# Patient Record
Sex: FEMALE | Race: BLACK OR AFRICAN AMERICAN | Employment: UNEMPLOYED | URBAN - METROPOLITAN AREA
[De-identification: names, ages, dates, MRNs, and addresses within clinical notes are randomized per-mention and may not be internally consistent; named-entity substitution may affect disease eponyms.]

---

## 2018-05-17 ENCOUNTER — HOSPITAL ENCOUNTER (INPATIENT)
Age: 73
LOS: 22 days | Discharge: HOME | DRG: 885 | End: 2018-06-08
Attending: PSYCHIATRY & NEUROLOGY | Admitting: PSYCHIATRY & NEUROLOGY
Payer: MEDICARE

## 2018-05-17 PROBLEM — F31.9 BIPOLAR DISORDER (HCC): Status: ACTIVE | Noted: 2018-05-17

## 2018-05-17 PROCEDURE — 65220000003 HC RM SEMIPRIVATE PSYCH

## 2018-05-17 PROCEDURE — 74011250637 HC RX REV CODE- 250/637: Performed by: NURSE PRACTITIONER

## 2018-05-17 RX ORDER — SIMVASTATIN 20 MG/1
20 TABLET, FILM COATED ORAL
COMMUNITY
End: 2018-06-08

## 2018-05-17 RX ORDER — AMOXICILLIN 250 MG
2 CAPSULE ORAL 2 TIMES DAILY
COMMUNITY
End: 2018-06-08

## 2018-05-17 RX ORDER — ACETAMINOPHEN 325 MG/1
650 TABLET ORAL
Status: DISCONTINUED | OUTPATIENT
Start: 2018-05-17 | End: 2018-06-08 | Stop reason: HOSPADM

## 2018-05-17 RX ORDER — LEVOTHYROXINE SODIUM 25 UG/1
25 TABLET ORAL
Status: DISCONTINUED | OUTPATIENT
Start: 2018-05-18 | End: 2018-05-18 | Stop reason: SDUPTHER

## 2018-05-17 RX ORDER — ZOLPIDEM TARTRATE 5 MG/1
5 TABLET ORAL
Status: DISCONTINUED | OUTPATIENT
Start: 2018-05-17 | End: 2018-06-08 | Stop reason: HOSPADM

## 2018-05-17 RX ORDER — IBUPROFEN 800 MG/1
800 TABLET ORAL
COMMUNITY

## 2018-05-17 RX ORDER — BENZTROPINE MESYLATE 1 MG/1
1 TABLET ORAL
Status: DISCONTINUED | OUTPATIENT
Start: 2018-05-17 | End: 2018-06-08 | Stop reason: HOSPADM

## 2018-05-17 RX ORDER — ALBUTEROL SULFATE 0.83 MG/ML
2.5 SOLUTION RESPIRATORY (INHALATION)
Status: DISCONTINUED | OUTPATIENT
Start: 2018-05-17 | End: 2018-05-18 | Stop reason: SDUPTHER

## 2018-05-17 RX ORDER — SIMVASTATIN 20 MG/1
20 TABLET, FILM COATED ORAL
Status: DISCONTINUED | OUTPATIENT
Start: 2018-05-17 | End: 2018-06-08 | Stop reason: HOSPADM

## 2018-05-17 RX ORDER — ALBUTEROL SULFATE 90 UG/1
1-2 AEROSOL, METERED RESPIRATORY (INHALATION)
Status: ON HOLD | COMMUNITY
End: 2018-06-08

## 2018-05-17 RX ORDER — CHOLECALCIFEROL (VITAMIN D3) 125 MCG
10 CAPSULE ORAL
COMMUNITY
End: 2018-06-08

## 2018-05-17 RX ORDER — LEVOTHYROXINE SODIUM 25 UG/1
25 TABLET ORAL
COMMUNITY
End: 2018-06-08

## 2018-05-17 RX ORDER — IBUPROFEN 200 MG
1 TABLET ORAL
Status: DISCONTINUED | OUTPATIENT
Start: 2018-05-17 | End: 2018-06-08 | Stop reason: HOSPADM

## 2018-05-17 RX ORDER — ADHESIVE BANDAGE
30 BANDAGE TOPICAL DAILY PRN
Status: DISCONTINUED | OUTPATIENT
Start: 2018-05-17 | End: 2018-06-08 | Stop reason: HOSPADM

## 2018-05-17 RX ORDER — CEPHALEXIN 500 MG/1
500 CAPSULE ORAL EVERY 12 HOURS
Status: COMPLETED | OUTPATIENT
Start: 2018-05-17 | End: 2018-05-21

## 2018-05-17 RX ORDER — OLANZAPINE 2.5 MG/1
2.5 TABLET ORAL
Status: DISCONTINUED | OUTPATIENT
Start: 2018-05-17 | End: 2018-05-28

## 2018-05-17 RX ORDER — DIVALPROEX SODIUM 250 MG/1
250 TABLET, DELAYED RELEASE ORAL
COMMUNITY
End: 2018-06-08

## 2018-05-17 RX ORDER — AMOXICILLIN 250 MG
1 CAPSULE ORAL
Status: DISCONTINUED | OUTPATIENT
Start: 2018-05-17 | End: 2018-06-08 | Stop reason: HOSPADM

## 2018-05-17 RX ORDER — IBUPROFEN 400 MG/1
400 TABLET ORAL
Status: DISCONTINUED | OUTPATIENT
Start: 2018-05-17 | End: 2018-05-18 | Stop reason: SDUPTHER

## 2018-05-17 RX ORDER — LOSARTAN POTASSIUM 50 MG/1
50 TABLET ORAL DAILY
COMMUNITY
End: 2018-06-08

## 2018-05-17 RX ORDER — BENZTROPINE MESYLATE 1 MG/ML
1 INJECTION INTRAMUSCULAR; INTRAVENOUS
Status: DISCONTINUED | OUTPATIENT
Start: 2018-05-17 | End: 2018-06-08 | Stop reason: HOSPADM

## 2018-05-17 RX ADMIN — STANDARDIZED SENNA CONCENTRATE AND DOCUSATE SODIUM 1 TABLET: 8.6; 5 TABLET, FILM COATED ORAL at 20:37

## 2018-05-17 RX ADMIN — SIMVASTATIN 20 MG: 20 TABLET, FILM COATED ORAL at 20:38

## 2018-05-17 RX ADMIN — CEPHALEXIN 500 MG: 500 CAPSULE ORAL at 20:37

## 2018-05-17 NOTE — CONSULTS
.        Hospitalist consult for H&P Note          Tam Lepe NP  Phone [phone]  Call physician on-call through the  7pm-7am    Primary Care Provider: Jimenez Barrett MD  Source of Information: Patient/Chart    History of Presenting Illness:   Pt admitted under TDO to [facility] psychiatry due to manic and delusional behavior, no sleep x 5 days. Her hygiene was poor, she had not been eating or drinking and had developed aggressive behaviors towards family. She is normally followed at home by a PACT program who gives her medications. She denies any recent medical problems. See ROS as below. She is aware she is in [place], could not recall name of hospital (thought is was [facility]) and knows what year/month this is. Says she is here because her family was trying to hurt her and then they put her here. Pt is cooperative and conversant. Review of Systems:  General: negative for fever, chills, sweats, weakness, wt loss  Eyes: negative for changes or loss in vision  Ear Nose and Throat: negative for rspeech or swallowing difficulties  Respiratory:  negative for cough, sputum production, SOB, wheezing, MUNOZ  Cardiology:  negative for chest pain, palpitations, edema   Gastrointestinal: negative for abdominal pain, N/V, change in bowel habits, bleeding  Genitourinary: negative for frequency, urgency, dysuria  Musculoskeletal : negative for arthralgia, myalgia  Skin: negative for rash, ulceration, new lesion  Neurological: negative for headache, dizziness, focal weakness, paresthesia, gait disturbance  Psychological: negative for anxiety, depression, agitation    Past Medical History:   Diagnosis Date    Asthma     Bipolar 1 disorder ([facility] [address]       No past surgical history on file. Prior to Admission medications    Medication Sig Start Date End Date Taking? Authorizing Provider   chlorproMAZINE (THORAZINE) 200 mg tablet Take 200 mg by mouth three (3) times daily.     hCriss White MD haloperidol decanoate (HALDOL DECANOATE) 100 mg/mL injection 100 mg by IntraMUSCular route every twenty-eight (28) days. Chriss White MD   amLODIPine (NORVASC) 10 mg tablet Take 10 mg by mouth daily. Chriss White MD   furosemide (LASIX) 20 mg tablet daily 12/27/13   Lesli Link MD     No Known Allergies   No family history on file. SOCIAL HISTORY:  Patient resides:  Independently    Assisted Living    SNF    With family care xx      Smoking history:   None xx   Former    Chronic      Alcohol history:   None xx   Social    Chronic      Ambulates:   Independently    w/cane    w/walker xx   w/wc      CODE STATUS:  DNR    Full xx   Other      Objective:   Physical Exam:   Visit Vitals    /78    Pulse 82    Temp 98 °F (36.7 °C)    Resp 16    SpO2 98%           General:  Alert, cooperative, no distress, appears stated age. HEENT:  Normocephalic, atraumatic. Conjunctivae/corneas clear. PERRL, EOMs intact. Mucosa nl. Edentulous. Lungs:   Clear to auscultation bilaterally. No Wheezing/Rhonchi/Rales. No SOB, no accessory muscle use and on RA    Heart:  Regular rate and rhythm, no murmur    Abdomen:   Soft, non-tender. Bowel sounds nl. Extremities: Extremities nl, atraumatic, no clubbing, cyanosis or edema. Pulses 2+ and symmetric. Good ROM. + crepitus in knees. Skin: Skin color, texture, turgor nl. No rashes or lesions. Psych: Cooperative, not anxious or agitated. A/O x 2-3.     Neurologic: No focal neurological deficits, moving all extremities, speech clear    Data Review:   Reviewed labs from outside hospital (in bedside chart)    Imaging: Reviewed CT results from outside hospital    Assessment & Plan     Altered Mental Status with Manic and Aggressive Behaviors:  - TDO  - hx of bipolar disorder and anxiety  - CT scan of head from outside facility: No acute abnormality  - pt oriented x 2-3 during assessment but still appears to have some delusions that her family is trying to hurt her    Possible UTI  - UA with nitrites, leuks and + bacteria from outside hospital  - pt started on keflex and appears as though she received two doses. Will continue this x 4 more days (to complete course of 5 days)    Hyponatremia:  - labs from outside hospital: Na 131  - received saline in outside hospital  - encourage po intakes  - follow labs in am    Hx Hypertension:  - med reconcillation reviewed, pt on losartan and norvasc at home  - BP normotensive at present, will hold on meds for right now, monitor    Hx Hypothyroidism:   - on 25 mcg synthroid at home - restart  - TSH ordered for am    Hx Asthma: resume albuterol prn    Hx Hyperlipidemia: resume simvastatin    Hx Osteoarthritis: supportive care and ibuprofen q 8 hours prn    Dispo: as per primary team  Will follow up in am - review vitals signs and labs. Adjust medications as needed.      Signed By: Meghan Majano NP     May 17, 2018

## 2018-05-17 NOTE — BH NOTES
Behavioral Health Admission Note:    Patient admitted under Dr Yaneth Randall for Bipolar    Admission status:  TDO    Chief complaint: Manic, Delusional    PTA MED VERIFICATION     PATIENT'S PHARMACY IS - [facility] PACT program  THE PHARMACY WAS - medication list provided  THEREFORE PTA MEDS WERE VERIFIED AND RECORDED ON PTA MED LIST    Primary Nurse Priyank Almanzar RN and Chapito Villanueva RN  performed a dual skin assessment on this patient           Jewelry on patient: none  Patient behavior: Pleasant  Safety: Q 15 min check, fall precaustions      Pressure Ulcer Documentation  (COMPLETE ONE LABEL PER PRESSURE ULCER)  For further information, please review corresponding Wound Care flowsheet. Vijaya Saravia has:     No pressure ulcer noted and pressure ulcer prevention initiated.

## 2018-05-17 NOTE — IP AVS SNAPSHOT
2700 [facility] [address] [place] [address] 
[phone] Patient: Sherif Rdz MRN: [id number] UZY:2/45/0252 A check hannah indicates which time of day the medication should be taken. My Medications START taking these medications Instructions Each Dose to Equal  
 Morning Noon Evening Bedtime  
 furosemide 20 mg tablet Commonly known as:  LASIX Your last dose was: Your next dose is: Take 1 Tab by mouth daily. Indications: Edema 20 mg  
    
   
   
   
  
 haloperidol 2 mg/mL oral concentrate Commonly known as:  HALDOL Your last dose was: Your next dose is: Take 2 mL by mouth three (3) times daily. Indications: Psychotic Disorder 4 mg  
    
   
   
   
  
 haloperidol decanoate 100 mg/mL injection Commonly known as:  HALDOL DECANOATE Start taking on:  7/2/2018 Replaces:  haloperidol decanoate 50 mg/mL injection Your last dose was: Your next dose is: 0.75 mL by IntraMUSCular route every twenty-eight (28) days. DUE June 22nd  Indications: schizoaffective disorder 75 mg  
    
   
   
   
  
 lisinopril 10 mg tablet Commonly known as:  Tildon Yoko Start taking on:  6/9/2018 Your last dose was: Your next dose is: Take 1 Tab by mouth daily. Indications: hypertension 10 mg  
    
   
   
   
  
 valproic acid (as sodium salt) 250 mg/5 mL (5 mL) Soln oral solution Commonly known as:  Dima Romero Your last dose was: Your next dose is: Take 10 mL by mouth two (2) times a day. Indications: Bipolar Disorder 500 mg CHANGE how you take these medications Instructions Each Dose to Equal  
 Morning Noon Evening Bedtime  
 senna-docusate 8.6-50 mg per tablet Commonly known as:  Sergei Frausto What changed:  when to take this Your last dose was: Your next dose is: Take 2 Tabs by mouth daily. Indications: constipation 2 Tab CONTINUE taking these medications Instructions Each Dose to Equal  
 Morning Noon Evening Bedtime  
 albuterol 90 mcg/actuation inhaler Commonly known as:  PROVENTIL HFA, VENTOLIN HFA, PROAIR HFA Your last dose was: Your next dose is: Take 1-2 Puffs by inhalation every four (4) hours as needed for Wheezing or Shortness of Breath. Indications: Acute Asthma Attack 1-2 Puff  
    
   
   
   
  
 ibuprofen 800 mg tablet Commonly known as:  MOTRIN Your last dose was: Your next dose is: Take 800 mg by mouth every eight (8) hours as needed for Pain. 800 mg  
    
   
   
   
  
 levothyroxine 25 mcg tablet Commonly known as:  SYNTHROID Start taking on:  6/9/2018 Your last dose was: Your next dose is: Take 1 Tab by mouth Daily (before breakfast). Indications: hypothyroidism 25 mcg  
    
   
   
   
  
 simvastatin 20 mg tablet Commonly known as:  ZOCOR Your last dose was: Your next dose is: Take 1 Tab by mouth nightly. Indications: hyperlipidemia 20 mg  
    
   
   
   
  
  
STOP taking these medications   
 amLODIPine 10 mg tablet Commonly known as:  NORVASC  
   
  
 chlorproMAZINE 200 mg tablet Commonly known as:  THORAZINE  
   
  
 divalproex  mg tablet Commonly known as:  DEPAKOTE  
   
  
 haloperidol decanoate 50 mg/mL injection Commonly known as:  HALDOL DECANOATE Replaced by:  haloperidol decanoate 100 mg/mL injection  
   
  
 losartan 50 mg tablet Commonly known as:  COZAAR  
   
  
 melatonin Tab tablet Where to Get Your Medications Information on where to get these meds will be given to you by the nurse or doctor. ! Ask your nurse or doctor about these medications  
  albuterol 90 mcg/actuation inhaler furosemide 20 mg tablet  
 haloperidol 2 mg/mL oral concentrate  
 haloperidol decanoate 100 mg/mL injection  
 levothyroxine 25 mcg tablet  
 lisinopril 10 mg tablet  
 senna-docusate 8.6-50 mg per tablet  
 simvastatin 20 mg tablet  
 valproic acid (as sodium salt) 250 mg/5 mL (5 mL) Soln oral solution

## 2018-05-17 NOTE — IP AVS SNAPSHOT
2700 [facility] [address] [place] [address] 
[phone] Patient: Kiran Robison MRN: [id number] DRR:9/48/4383 About your hospitalization You were admitted on:  May 17, 2018 You last received care in the:  [address] [place] [address] You were discharged on:  June 8, 2018 Why you were hospitalized Your primary diagnosis was:  Bipolar Disorder (Hcc) Follow-up Information Follow up With Details Comments Contact Info Jatinder Alcantar MD Schedule an appointment as soon as possible for a visit  [address] [place] [phone] 
[phone] [address] On 6/9/2018 Patient's PACT team will come see Patient in the home. [address] [place] [address]) [phone] Discharge Orders None A check hannah indicates which time of day the medication should be taken. My Medications START taking these medications Instructions Each Dose to Equal  
 Morning Noon Evening Bedtime  
 furosemide 20 mg tablet Commonly known as:  LASIX Your last dose was: Your next dose is: Take 1 Tab by mouth daily. Indications: Edema 20 mg  
    
   
   
   
  
 haloperidol 2 mg/mL oral concentrate Commonly known as:  HALDOL Your last dose was: Your next dose is: Take 2 mL by mouth three (3) times daily. Indications: Psychotic Disorder 4 mg  
    
   
   
   
  
 haloperidol decanoate 100 mg/mL injection Commonly known as:  HALDOL DECANOATE Start taking on:  7/2/2018 Replaces:  haloperidol decanoate 50 mg/mL injection Your last dose was: Your next dose is: 0.75 mL by IntraMUSCular route every twenty-eight (28) days. DUE June 22nd  Indications: schizoaffective disorder 75 mg  
    
   
   
   
  
 lisinopril 10 mg tablet Commonly known as:  Therisa Semen  
 Start taking on:  6/9/2018 Your last dose was: Your next dose is: Take 1 Tab by mouth daily. Indications: hypertension 10 mg  
    
   
   
   
  
 valproic acid (as sodium salt) 250 mg/5 mL (5 mL) Soln oral solution Commonly known as:  Kenyetta Zamora Your last dose was: Your next dose is: Take 10 mL by mouth two (2) times a day. Indications: Bipolar Disorder 500 mg CHANGE how you take these medications Instructions Each Dose to Equal  
 Morning Noon Evening Bedtime  
 senna-docusate 8.6-50 mg per tablet Commonly known as:  Paxton Parsons What changed:  when to take this Your last dose was: Your next dose is: Take 2 Tabs by mouth daily. Indications: constipation 2 Tab CONTINUE taking these medications Instructions Each Dose to Equal  
 Morning Noon Evening Bedtime  
 albuterol 90 mcg/actuation inhaler Commonly known as:  PROVENTIL HFA, VENTOLIN HFA, PROAIR HFA Your last dose was: Your next dose is: Take 1-2 Puffs by inhalation every four (4) hours as needed for Wheezing or Shortness of Breath. Indications: Acute Asthma Attack 1-2 Puff  
    
   
   
   
  
 ibuprofen 800 mg tablet Commonly known as:  MOTRIN Your last dose was: Your next dose is: Take 800 mg by mouth every eight (8) hours as needed for Pain. 800 mg  
    
   
   
   
  
 levothyroxine 25 mcg tablet Commonly known as:  SYNTHROID Start taking on:  6/9/2018 Your last dose was: Your next dose is: Take 1 Tab by mouth Daily (before breakfast). Indications: hypothyroidism 25 mcg  
    
   
   
   
  
 simvastatin 20 mg tablet Commonly known as:  ZOCOR Your last dose was: Your next dose is: Take 1 Tab by mouth nightly. Indications: hyperlipidemia  20 mg  
    
   
   
   
  
  
 STOP taking these medications   
 amLODIPine 10 mg tablet Commonly known as:  NORVASC  
   
  
 chlorproMAZINE 200 mg tablet Commonly known as:  THORAZINE  
   
  
 divalproex  mg tablet Commonly known as:  DEPAKOTE  
   
  
 haloperidol decanoate 50 mg/mL injection Commonly known as:  HALDOL DECANOATE Replaced by:  haloperidol decanoate 100 mg/mL injection  
   
  
 losartan 50 mg tablet Commonly known as:  COZAAR  
   
  
 melatonin Tab tablet Where to Get Your Medications Information on where to get these meds will be given to you by the nurse or doctor. ! Ask your nurse or doctor about these medications  
  albuterol 90 mcg/actuation inhaler  
 furosemide 20 mg tablet  
 haloperidol 2 mg/mL oral concentrate  
 haloperidol decanoate 100 mg/mL injection  
 levothyroxine 25 mcg tablet  
 lisinopril 10 mg tablet  
 senna-docusate 8.6-50 mg per tablet  
 simvastatin 20 mg tablet  
 valproic acid (as sodium salt) 250 mg/5 mL (5 mL) Soln oral solution Discharge Instructions DISCHARGE SUMMARY 
 
NAME:Maryann Gonzales : 1945 MRN: [id number] The patient Jazmin Rockwell exhibits the ability to control behavior in a less restrictive environment. Patient's level of functioning is improving. No assaultive/destructive behavior has been observed for the past 24 hours. No suicidal/homicidal threat or behavior has been observed for the past 24 hours. There is no evidence of serious medication side effects. Patient has not been in physical or protective restraints for at least the past 24 hours. If weapons involved, how are they secured? No weapons involved. Is patient aware of and in agreement with discharge plan? Yes Arrangements for medication:  Prescriptions given to patient. Referral for substance abuse treatment? Patient is not using substances/Not applicable. Referral for smoking cessation needed?   Patient is not a smoker/Not applicable. Copy of discharge instructions to provider?:  Marina Mcdaniel Arrangements for transportation home:  Medicaid taxi. Keep all follow up appointments as scheduled, continue to take prescribed medications per physician instructions. Mental health crisis number:  278 or your local mental health crisis line number at [phone]. DISCHARGE SUMMARY from Nurse PATIENT INSTRUCTIONS: 
 
What to do at Home: 
Recommended activity: Activity as tolerated, If you experience any of the following symptoms Anxiety, depression, thoughts about hurting yourself or others, please follow up with 911 or your local mental health crisis line number at [phone]. . 
 
*  Please give a list of your current medications to your Primary Care Provider. *  Please update this list whenever your medications are discontinued, doses are 
    changed, or new medications (including over-the-counter products) are added. *  Please carry medication information at all times in case of emergency situations. These are general instructions for a healthy lifestyle: No smoking/ No tobacco products/ Avoid exposure to second hand smoke Surgeon General's Warning:  Quitting smoking now greatly reduces serious risk to your health. Obesity, smoking, and sedentary lifestyle greatly increases your risk for illness A healthy diet, regular physical exercise & weight monitoring are important for maintaining a healthy lifestyle You may be retaining fluid if you have a history of heart failure or if you experience any of the following symptoms:  Weight gain of 3 pounds or more overnight or 5 pounds in a week, increased swelling in our hands or feet or shortness of breath while lying flat in bed. Please call your doctor as soon as you notice any of these symptoms; do not wait until your next office visit. Recognize signs and symptoms of STROKE: 
 
F-face looks uneven A-arms unable to move or move unevenly S-speech slurred or non-existent T-time-call 911 as soon as signs and symptoms begin-DO NOT go Back to bed or wait to see if you get better-TIME IS BRAIN. Warning Signs of HEART ATTACK Call 911 if you have these symptoms: 
? Chest discomfort. Most heart attacks involve discomfort in the center of the chest that lasts more than a few minutes, or that goes away and comes back. It can feel like uncomfortable pressure, squeezing, fullness, or pain. ? Discomfort in other areas of the upper body. Symptoms can include pain or discomfort in one or both arms, the back, neck, jaw, or stomach. ? Shortness of breath with or without chest discomfort. ? Other signs may include breaking out in a cold sweat, nausea, or lightheadedness. Don't wait more than five minutes to call [address]! Fast action can save your life. Calling 911 is almost always the fastest way to get lifesaving treatment. Emergency Medical Services staff can begin treatment when they arrive  up to an hour sooner than if someone gets to the hospital by car. The discharge information has been reviewed with the patient. The patient verbalized understanding. Discharge medications reviewed with the patient and appropriate educational materials and side effects teaching were provided. ___________________________________________________________________________________________________________________________________ Introducing [facility]! Jalen Ballesteros introduces [employer] patient portal. Now you can access parts of your medical record, email your doctor's office, and request medication refills online. 1. In your internet browser, go to [email or web address] 2. Click on the First Time User? Click Here link in the Sign In box. You will see the New Member Sign Up page. 3. Enter your MyChart Access Code exactly as it appears below.  You will not need to use this code after youve completed the sign-up process. If you do not sign up before the expiration date, you must request a new code. · [employer] Access Code: [email or web address] Expires: 8/17/2018  8:12 PM 
 
4. Enter the last four digits of your Social Security Number (xxxx) and Date of Birth (mm/dd/yyyy) as indicated and click Submit. You will be taken to the next sign-up page. 5. Create a [employer] ID. This will be your [employer] login ID and cannot be changed, so think of one that is secure and easy to remember. 6. Create a [employer] password. You can change your password at any time. 7. Enter your Password Reset Question and Answer. This can be used at a later time if you forget your password. 8. Enter your e-mail address. You will receive e-mail notification when new information is available in [address] 9. Click Sign Up. You can now view and download portions of your medical record. 10. Click the Download Summary menu link to download a portable copy of your medical information. If you have questions, please visit the Frequently Asked Questions section of the [employer] website. Remember, [employer] is NOT to be used for urgent needs. For medical emergencies, dial 911. Now available from your iPhone and Android! Introducing [employer] As a [employer] patient, I wanted to make you aware of our electronic visit tool called [employer]. [employer] 24/7 allows you to connect within minutes with a medical provider 24 hours a day, seven days a week via a mobile device or tablet or logging into a secure website from your computer. You can access Gray Flower from anywhere in the [place].  
 
A virtual visit might be right for you when you have a simple condition and feel like you just dont want to get out of bed, or cant get away from work for an appointment, when your regular [employer] provider is not available (evenings, weekends or holidays), or when youre out of town and need minor care. Electronic visits cost only $49 and if the [facility] provider determines a prescription is needed to treat your condition, one can be electronically transmitted to a nearby pharmacy*. Please take a moment to enroll today if you have not already done so. The enrollment process is free and takes just a few minutes. To enroll, please download the WAKU WAKU ???? dayton to your tablet or phone, or visit [email or web address] to enroll on your computer. And, as an [address] [place] [address] patient with a [employer] account, the results of your visits will be scanned into your electronic medical record and your primary care provider will be able to view the scanned results. We urge you to continue to see your regular [facility] provider for your ongoing medical care. And while your primary care provider may not be the one available when you seek a [employer] virtual visit, the peace of mind you get from getting a real diagnosis real time can be priceless. For more information on [employer], view our Frequently Asked Questions (FAQs) at [email or web address]. Sincerely, 
 
Lynda Ochoa MD 
Chief Medical Officer [facility] Jayna Wing *:  certain medications cannot be prescribed via [employer] Providers Seen During Your Hospitalization Provider Specialty Primary office phone Nimco Machado MD Psychiatry [phone] Flory Nobles MD Psychiatry [phone] Geovany Lara MD Psychiatry [phone] Your Primary Care Physician (PCP) Primary Care Physician Office Phone Office Fax Danae Chan [phone] You are allergic to the following No active allergies Recent Documentation Height Weight BMI Smoking Status 1.549 m 74.1 kg 30.87 kg/m2 Never Smoker Emergency Contacts Name Discharge Info Relation Home Work Mobile Nando Fritz  Child [2] [phone] Patient Belongings The following personal items are in your possession at time of discharge: 
     Visual Aid: Glasses      Home Medications: Locked (aspirin bottle and inhaler)      Clothing: Footwear (locked in belongings closet)    Other Valuables: Peola Plana, Wallet (locked in belonging closet)  Personal Items Sent to Safe: debit card, DL, 21.00 cash Please provide this summary of care documentation to your next provider. Signatures-by signing, you are acknowledging that this After Visit Summary has been reviewed with you and you have received a copy. Patient Signature:  ____________________________________________________________ Date:  ____________________________________________________________  
  
Nery Cristobal Provider Signature:  ____________________________________________________________ Date:  ____________________________________________________________

## 2018-05-18 ENCOUNTER — APPOINTMENT (OUTPATIENT)
Dept: GENERAL RADIOLOGY | Age: 73
DRG: 885 | End: 2018-05-18
Attending: FAMILY MEDICINE
Payer: MEDICARE

## 2018-05-18 LAB
ALBUMIN SERPL-MCNC: 3 G/DL (ref 3.5–5)
ALBUMIN/GLOB SERPL: 1 {RATIO} (ref 1.1–2.2)
ALP SERPL-CCNC: 95 U/L (ref 45–117)
ALT SERPL-CCNC: 20 U/L (ref 12–78)
ANION GAP SERPL CALC-SCNC: 7 MMOL/L (ref 5–15)
AST SERPL-CCNC: 17 U/L (ref 15–37)
BILIRUB SERPL-MCNC: 0.2 MG/DL (ref 0.2–1)
BUN SERPL-MCNC: 12 MG/DL (ref 6–20)
BUN/CREAT SERPL: 21 (ref 12–20)
CALCIUM SERPL-MCNC: 8.6 MG/DL (ref 8.5–10.1)
CHLORIDE SERPL-SCNC: 102 MMOL/L (ref 97–108)
CHOLEST SERPL-MCNC: 132 MG/DL
CO2 SERPL-SCNC: 28 MMOL/L (ref 21–32)
CREAT SERPL-MCNC: 0.56 MG/DL (ref 0.55–1.02)
ERYTHROCYTE [DISTWIDTH] IN BLOOD BY AUTOMATED COUNT: 13.2 % (ref 11.5–14.5)
GLOBULIN SER CALC-MCNC: 3 G/DL (ref 2–4)
GLUCOSE P FAST SERPL-MCNC: 81 MG/DL (ref 65–100)
GLUCOSE SERPL-MCNC: 81 MG/DL (ref 65–100)
HCT VFR BLD AUTO: 36.3 % (ref 35–47)
HDLC SERPL-MCNC: 73 MG/DL
HDLC SERPL: 1.8 {RATIO} (ref 0–5)
HGB BLD-MCNC: 12.1 G/DL (ref 11.5–16)
LDLC SERPL CALC-MCNC: 51 MG/DL (ref 0–100)
LIPID PROFILE,FLP: NORMAL
MCH RBC QN AUTO: 29.5 PG (ref 26–34)
MCHC RBC AUTO-ENTMCNC: 33.3 G/DL (ref 30–36.5)
MCV RBC AUTO: 88.5 FL (ref 80–99)
NRBC # BLD: 0 K/UL (ref 0–0.01)
NRBC BLD-RTO: 0 PER 100 WBC
PLATELET # BLD AUTO: 223 K/UL (ref 150–400)
PMV BLD AUTO: 9 FL (ref 8.9–12.9)
POTASSIUM SERPL-SCNC: 3.8 MMOL/L (ref 3.5–5.1)
PROT SERPL-MCNC: 6 G/DL (ref 6.4–8.2)
RBC # BLD AUTO: 4.1 M/UL (ref 3.8–5.2)
SODIUM SERPL-SCNC: 137 MMOL/L (ref 136–145)
TRIGL SERPL-MCNC: 40 MG/DL (ref ?–150)
TSH SERPL DL<=0.05 MIU/L-ACNC: 1.11 UIU/ML (ref 0.36–3.74)
VLDLC SERPL CALC-MCNC: 8 MG/DL
WBC # BLD AUTO: 3.2 K/UL (ref 3.6–11)

## 2018-05-18 PROCEDURE — 94640 AIRWAY INHALATION TREATMENT: CPT

## 2018-05-18 PROCEDURE — 84443 ASSAY THYROID STIM HORMONE: CPT | Performed by: PSYCHIATRY & NEUROLOGY

## 2018-05-18 PROCEDURE — 82947 ASSAY GLUCOSE BLOOD QUANT: CPT | Performed by: PSYCHIATRY & NEUROLOGY

## 2018-05-18 PROCEDURE — 65220000003 HC RM SEMIPRIVATE PSYCH

## 2018-05-18 PROCEDURE — 85027 COMPLETE CBC AUTOMATED: CPT | Performed by: PSYCHIATRY & NEUROLOGY

## 2018-05-18 PROCEDURE — 80053 COMPREHEN METABOLIC PANEL: CPT | Performed by: PSYCHIATRY & NEUROLOGY

## 2018-05-18 PROCEDURE — 74011250637 HC RX REV CODE- 250/637: Performed by: PSYCHIATRY & NEUROLOGY

## 2018-05-18 PROCEDURE — 36415 COLL VENOUS BLD VENIPUNCTURE: CPT | Performed by: PSYCHIATRY & NEUROLOGY

## 2018-05-18 PROCEDURE — 74011250637 HC RX REV CODE- 250/637: Performed by: NURSE PRACTITIONER

## 2018-05-18 PROCEDURE — 71046 X-RAY EXAM CHEST 2 VIEWS: CPT

## 2018-05-18 PROCEDURE — 74011000250 HC RX REV CODE- 250: Performed by: NURSE PRACTITIONER

## 2018-05-18 PROCEDURE — 80061 LIPID PANEL: CPT | Performed by: PSYCHIATRY & NEUROLOGY

## 2018-05-18 RX ORDER — IBUPROFEN 400 MG/1
800 TABLET ORAL
Status: DISCONTINUED | OUTPATIENT
Start: 2018-05-18 | End: 2018-06-08 | Stop reason: HOSPADM

## 2018-05-18 RX ORDER — CHLORPROMAZINE HYDROCHLORIDE 200 MG/1
400 TABLET, FILM COATED ORAL EVERY EVENING
COMMUNITY
End: 2018-06-08

## 2018-05-18 RX ORDER — LEVOTHYROXINE SODIUM 25 UG/1
25 TABLET ORAL
Status: DISCONTINUED | OUTPATIENT
Start: 2018-05-19 | End: 2018-06-08 | Stop reason: HOSPADM

## 2018-05-18 RX ORDER — SENNOSIDES 8.6 MG/1
1 TABLET ORAL DAILY
Status: DISCONTINUED | OUTPATIENT
Start: 2018-05-19 | End: 2018-05-18

## 2018-05-18 RX ORDER — CHLORPROMAZINE HYDROCHLORIDE 200 MG/1
200 TABLET, FILM COATED ORAL DAILY
COMMUNITY
End: 2018-06-08

## 2018-05-18 RX ORDER — ALBUTEROL SULFATE 0.83 MG/ML
2.5 SOLUTION RESPIRATORY (INHALATION)
Status: DISCONTINUED | OUTPATIENT
Start: 2018-05-18 | End: 2018-06-08 | Stop reason: HOSPADM

## 2018-05-18 RX ORDER — HALOPERIDOL DECANOATE 100 MG/ML
100 INJECTION INTRAMUSCULAR
Status: DISCONTINUED | OUTPATIENT
Start: 2018-05-18 | End: 2018-05-18

## 2018-05-18 RX ORDER — ALBUTEROL SULFATE 90 UG/1
1-2 AEROSOL, METERED RESPIRATORY (INHALATION)
Status: DISCONTINUED | OUTPATIENT
Start: 2018-05-18 | End: 2018-05-18 | Stop reason: ALTCHOICE

## 2018-05-18 RX ORDER — CHLORPROMAZINE HYDROCHLORIDE 50 MG/1
400 TABLET, FILM COATED ORAL EVERY EVENING
Status: DISCONTINUED | OUTPATIENT
Start: 2018-05-18 | End: 2018-05-28

## 2018-05-18 RX ORDER — CHLORPROMAZINE HYDROCHLORIDE 50 MG/1
200 TABLET, FILM COATED ORAL
Status: DISCONTINUED | OUTPATIENT
Start: 2018-05-19 | End: 2018-05-28

## 2018-05-18 RX ORDER — HALOPERIDOL DECANOATE 50 MG/ML
37.5 INJECTION INTRAMUSCULAR
COMMUNITY
End: 2018-06-08

## 2018-05-18 RX ORDER — DIVALPROEX SODIUM 125 MG/1
250 TABLET, DELAYED RELEASE ORAL
Status: DISCONTINUED | OUTPATIENT
Start: 2018-05-18 | End: 2018-05-25

## 2018-05-18 RX ADMIN — LEVOTHYROXINE SODIUM 25 MCG: 25 TABLET ORAL at 06:55

## 2018-05-18 RX ADMIN — DIVALPROEX SODIUM 250 MG: 125 TABLET, DELAYED RELEASE ORAL at 21:02

## 2018-05-18 RX ADMIN — STANDARDIZED SENNA CONCENTRATE AND DOCUSATE SODIUM 1 TABLET: 8.6; 5 TABLET, FILM COATED ORAL at 21:02

## 2018-05-18 RX ADMIN — SIMVASTATIN 20 MG: 20 TABLET, FILM COATED ORAL at 21:02

## 2018-05-18 RX ADMIN — CEPHALEXIN 500 MG: 500 CAPSULE ORAL at 21:02

## 2018-05-18 RX ADMIN — CEPHALEXIN 500 MG: 500 CAPSULE ORAL at 08:30

## 2018-05-18 RX ADMIN — ALBUTEROL SULFATE 2.5 MG: 2.5 SOLUTION RESPIRATORY (INHALATION) at 21:57

## 2018-05-18 RX ADMIN — CHLORPROMAZINE HYDROCHLORIDE 400 MG: 50 TABLET, SUGAR COATED ORAL at 17:13

## 2018-05-18 NOTE — PROGRESS NOTES
Problem: Altered Thought Process (Adult/Pediatric)  Goal: *STG: Participates in treatment plan  Outcome: Progressing Towards Goal  Pt arrived on the unit and was pleasant and cooperative. Pt was patient when she needed to wait for her dinner. Pt compliant with meal and meds. Pt went to bed shortly after dinner.

## 2018-05-18 NOTE — PROGRESS NOTES
Problem: Altered Thought Process (Adult/Pediatric)  Goal: *STG: Attends activities and groups  Outcome: Progressing Towards Goal  1515 Received patient resting in bed with eyes open. Greeted staff with a smile. NAD. On q 15 min. Safety checks. Voiced no complaints. VS taken and WNL. Patient ate 100% dinner & fluids in DR with peers. She was observed coughing. Offered water. She said \"don't need water, I need my inhaler\". No inhaler was ordered. Checked PTA meds and inhaler was listed. Called Hospitalist and he wrote order for Chest X-Ray and also for Jet Nebs Treatment. Chest X-Ray was completed. See results. Resp. Tx called and patient received treatment. Patient was visible in DR all shift watching TV, eating dinner and talking. Remains hyper verbal, loud with pressured speech. She was med compliant. Focused on calling her son and how she would get home. Son was called several times and no message left due to mail box was full. Limit setting and redirections given. Cooperative with staff.

## 2018-05-18 NOTE — BH NOTES
PSYCHOSOCIAL ASSESSMENT  :Patient identifying info: Sherif Rdz is a 75 y.o., female admitted 2018  4:03 PM     Presenting problem and precipitating factors: Patient was transferred for the [facility] on a TDO due to psychotic behavior. She is followed by the [address] [place] [address] PACT team and lives with her sisters. She has not been sleeping , not caring for her ADL' s and has been threatening to her sisters . She was committed to [facility] today at her hearing. Mental status assessment:She is alert oriented x's 3 - she is delusional and guarded. Insight and judgement are impaired. Current psychiatric providers and contact info: [facility] team - [phone]    Previous psychiatric services/providers and response to treatment: she has a long history of psych treatment     Family history of mental illness :unknown      Substance abuse history:    Social History   Substance Use Topics    Smoking status: Never Smoker    Smokeless tobacco: Not on file    Alcohol use No       Family constellation:     Is significant other involved?        Describe support system: sisters     Describe living arrangements and home environment:lives at home with her sisters   Health issues:   Hospital Problems  Date Reviewed: 2013          Codes Class Noted POA    Bipolar disorder [facility]) ICD-10-CM: F31.9  ICD-9-CM: 296.80  2018 Unknown              Trauma history: none noted     Legal issues: no    History of  service: no    Financial status: disability     Amish/cultural factors: none noted     Education/work history: unknown    Have you been licensed as a alan care professional (current or ): no  Leisure and recreation preferences: no  Describe coping skills:ineffectual     Yvonne Hernandez  2018

## 2018-05-18 NOTE — PROGRESS NOTES
Laboratory Monitoring for Antipsychotics: This patient is currently prescribed the following medication(s):   Current Facility-Administered Medications   Medication Dose Route Frequency    divalproex DR (DEPAKOTE) tablet 250 mg  250 mg Oral QHS    [START ON 5/19/2018] levothyroxine (SYNTHROID) tablet 25 mcg  25 mcg Oral ACB    [START ON 5/19/2018] chlorproMAZINE (THORAZINE) tablet 200 mg  200 mg Oral 7am    chlorproMAZINE (THORAZINE) tablet 400 mg  400 mg Oral QPM    cephALEXin (KEFLEX) capsule 500 mg  500 mg Oral Q12H    senna-docusate (PERICOLACE) 8.6-50 mg per tablet 1 Tab  1 Tab Oral QHS    simvastatin (ZOCOR) tablet 20 mg  20 mg Oral QHS     The following labs have been completed for monitoring of antipsychotics and/or mood stabilizers:    Height, Weight, BMI Estimation  Estimated body mass index is 31.8 kg/(m^2) as calculated from the following:    Height as of this encounter: 154.9 cm (61\"). Weight as of this encounter: 76.3 kg (168 lb 4.8 oz). Vital Signs/Blood Pressure  Visit Vitals    /85 (BP 1 Location: Right arm, BP Patient Position: At rest;Sitting)    Pulse 87    Temp 98.1 °F (36.7 °C)    Resp 16    Ht 154.9 cm (61\")    Wt 76.3 kg (168 lb 4.8 oz)    SpO2 100%    BMI 31.8 kg/m2     Renal Function, Hepatic Function and Chemistry  Estimated Creatinine Clearance: 82.4 mL/min (based on Cr of 0.56). Lab Results   Component Value Date/Time    Sodium 137 05/18/2018 06:12 AM    Potassium 3.8 05/18/2018 06:12 AM    Chloride 102 05/18/2018 06:12 AM    CO2 28 05/18/2018 06:12 AM    Anion gap 7 05/18/2018 06:12 AM    BUN 12 05/18/2018 06:12 AM    Creatinine 0.56 05/18/2018 06:12 AM    BUN/Creatinine ratio 21 (H) 05/18/2018 06:12 AM    Bilirubin, total 0.2 05/18/2018 06:12 AM    Protein, total 6.0 (L) 05/18/2018 06:12 AM    Albumin 3.0 (L) 05/18/2018 06:12 AM    Globulin 3.0 05/18/2018 06:12 AM    A-G Ratio 1.0 (L) 05/18/2018 06:12 AM    ALT (SGPT) 20 05/18/2018 06:12 AM    Alk. phosphatase 95 05/18/2018 06:12 AM     Lab Results   Component Value Date/Time    Glucose 81 05/18/2018 06:12 AM    Glucose 81 05/18/2018 06:12 AM     No results found for: HBA1C, HGBE8, HXW1LUPB    Hematology  Lab Results   Component Value Date/Time    WBC 3.2 (L) 05/18/2018 06:12 AM    RBC 4.10 05/18/2018 06:12 AM    HGB 12.1 05/18/2018 06:12 AM    HCT 36.3 05/18/2018 06:12 AM    MCV 88.5 05/18/2018 06:12 AM    MCH 29.5 05/18/2018 06:12 AM    MCHC 33.3 05/18/2018 06:12 AM    RDW 13.2 05/18/2018 06:12 AM    PLATELET 826 39/35/9166 06:12 AM     Lipids  Lab Results   Component Value Date/Time    Cholesterol, total 132 05/18/2018 06:12 AM    HDL Cholesterol 73 05/18/2018 06:12 AM    LDL, calculated 51 05/18/2018 06:12 AM    Triglyceride 40 05/18/2018 06:12 AM    CHOL/HDL Ratio 1.8 05/18/2018 06:12 AM     Thyroid Function  Lab Results   Component Value Date/Time    TSH 1.11 05/18/2018 06:12 AM     Assessment/Plan:  Recommended baseline laboratory monitoring has been completed based on this patient's current medication regimen. Patient takes simvastatin 20mg as outpatient, which has been continued while inpatient.      Grace Pelayo, PharmD, BCPP, [facility], [address] [facility]

## 2018-05-18 NOTE — BH NOTES
GROUP THERAPY PROGRESS NOTE    Dontae Marie participated in a morning Process Group on the Geriatric Unit, with a focus identifying feelings, planning for the day, and singing. Group time: 45 minutes. Personal goal for participation: To increase the capacity to shift ones mood, prepare for the day, and share in group singing. Goal orientation: The patient will be able to prepare for the day through group singing. Group therapy participation: When prompted, this patient actively participated in the group. Therapeutic interventions reviewed and discussed: The group members were introduce themselves by first names and participate in group singing as a way to increase their oxygen and blood flow and begin their day on a positive note. They were also asked to join in singing several songs. Impression of participation: The patient said she was feeling \"great\" and that she wanted to focus \"on Ander. \" When asked how she got into the hospital, she replied that she got into a disagreement with her daughter-in-law. She expressed no current SI/HI and displayed no overt psychosis in this group. She was actively engaged in the music and the singing. She sang along with many of the tunes and kept good rhythm with a tambourine and egg. Her affect was mildly euphoric and her mood matched her affect. She was alert and cooperative and said she enjoyed the music. This was the patient's first process group with the undersigned.

## 2018-05-18 NOTE — PROGRESS NOTES
Problem: Falls - Risk of  Goal: *Absence of Falls  Document Jovana Fall Risk and appropriate interventions in the flowsheet. Outcome: Progressing Towards Goal  Fall Risk Interventions:  Mobility Interventions: Utilize walker, cane, or other assitive device, Communicate number of staff needed for ambulation/transfer, Bed/chair exit alarm  In bed asleep at this writing with respirations noted as even and unlabored as chest was rising and falling.  Will continue to monitor for safety and 15 minute checks throughout shift       Medication Interventions: Teach patient to arise slowly, Patient to call before getting OOB

## 2018-05-18 NOTE — PROGRESS NOTES
Follow up from H&P yesterday:     Labs have been reviewed (sodium normal)  VS reviewed: BP fairly stable off of antihypertensives. Concern for hypotension if BP started at this time. She was on noravsc (10 mg) and cozaar (50 mg) daily. May consider to start one of those at a lower dose if see BP start to rise. Thank you for giving us opportunity to participate in this patients care.  Will sign off at this time, please re-consult if there are any further medical management needs or questions

## 2018-05-18 NOTE — INTERDISCIPLINARY ROUNDS
Behavioral Health Interdisciplinary Rounds     Patient Name: Lu Coates  Age: 75 y.o. Room/Bed:  742/  Primary Diagnosis: <principal problem not specified>   Admission Status:involuntarily committed    Readmission within 30 days: no  Power of  in place: no  Patient requires a blocked bed: no          Reason for blocked bed:     VTE Prophylaxis: Not indicated    Mobility needs/Fall risk: yes    Nutritional Plan: no  Consults:          Labs/Testing due today?: yes    Sleep hours:  5.50      Participation in Care/Groups:  N/A  Medication Compliant?: Yes  PRNS (last 24 hours): None    Restraints (last 24 hours):  no     CIWA (range last 24 hours):     COWS (range last 24 hours):      Alcohol screening (AUDIT) completed -   AUDIT Score: 0     If applicable, date SBIRT discussed in treatment team AND documented:     Tobacco - patient is a smoker: Have You Used Tobacco in the Past 30 Days: No  Illegal Drugs use: Have You Used Any Illegal Substances Over the Past 12 Months: No    24 hour chart check complete: yes     Patient goal(s) for today:   Treatment team focus/goals: Plan to assess for medications and discharge needs. Progress note she had her hearing this am and was committed to [facility]. No behavior problems. LOS:  1  Expected LOS:TBD     Financial concerns/prescription coverage:  Medicare   Date of last family contact:       Family requesting physician contact today:    Discharge plan: She will return home when ready for discharge   Guns in the home:  no       Outpatient provider(s): [facility]    Participating treatment team members: Tiny Pacheco Dr.,PharmD.

## 2018-05-18 NOTE — H&P
INITIAL PSYCHIATRIC EVALUATION            IDENTIFICATION:    Patient Name  Carlos Tadeo   Date of Birth 1945   [facility] [id number]   Medical Record Number  [id number]      Age  75 y.o. PCP Micaela Allen MD   Admit date:  5/17/2018    Room Number  742/01  @ [facility]   Date of Service  5/18/2018            HISTORY         REASON FOR HOSPITALIZATION:  CC: \"agitation, delusions and dario\". Pt admitted under a temporary intermediate order (TDO)  For severe psychosis and dario proving to be an imminent danger to self and others and an inability to care for self. HISTORY OF PRESENT ILLNESS:    The patient, Carlos Tadeo, is a 75 y.o. BLACK OR  female with a past psychiatric history significant for bipolar d/o , who presents at this time with complaints of (and/or evidence of) the following emotional symptoms: agitation, delusions and psychotic behavior. Additional symptomatology include agitation. The above symptoms have been present for 3 days . These symptoms are of severe severity. These symptoms are constant  in nature. The patient's condition has been precipitated by medication adjustments and psychosocial stressors (family conflicts  ). Patient's condition made worse by treatment noncompliance. UDS: MJ negative; BAL=0. ALLERGIES: No Known Allergies   MEDICATIONS PRIOR TO ADMISSION:   Prescriptions Prior to Admission   Medication Sig    divalproex DR (DEPAKOTE) 250 mg tablet Take 250 mg by mouth nightly.  ibuprofen (MOTRIN) 800 mg tablet Take 800 mg by mouth every eight (8) hours as needed for Pain.  simvastatin (ZOCOR) 20 mg tablet Take  by mouth nightly.  levothyroxine (SYNTHROID) 25 mcg tablet Take 25 mcg by mouth Daily (before breakfast).  melatonin tab tablet Take 10 mg by mouth nightly.  losartan (COZAAR) 50 mg tablet Take 50 mg by mouth daily.     albuterol (PROVENTIL HFA, VENTOLIN HFA, PROAIR HFA) 90 mcg/actuation inhaler Take 1-2 Puffs by inhalation every four (4) hours as needed for Wheezing or Shortness of Breath.  senna-docusate (PERICOLACE) 8.6-50 mg per tablet Take 2 Tabs by mouth two (2) times a day.  chlorproMAZINE (THORAZINE) 200 mg tablet Take 200 mg by mouth three (3) times daily.  haloperidol decanoate (HALDOL DECANOATE) 100 mg/mL injection 100 mg by IntraMUSCular route every twenty-eight (28) days.  amLODIPine (NORVASC) 10 mg tablet Take 10 mg by mouth daily.  furosemide (LASIX) 20 mg tablet daily      PAST MEDICAL HISTORY:   Past Medical History:   Diagnosis Date    Asthma     Bipolar 1 disorder ([facility] [address]    No past surgical history on file. SOCIAL HISTORY:    Social History     Social History    Marital status:      Spouse name: N/A    Number of children: N/A    Years of education: N/A     Occupational History    Not on file. Social History Main Topics    Smoking status: Never Smoker    Smokeless tobacco: Not on file    Alcohol use No    Drug use: No    Sexual activity: Not Currently     Other Topics Concern    Not on file     Social History Narrative    75year old AA female admitted on TDO for agitation, making threats and having a fixed delusion that she is . She has been making racial remarks and placing herself in danger. Pt has hx of treatment with Depakote, Thorazine and Haldol Dec.      FAMILY HISTORY:    No family history on file. REVIEW OF SYSTEMS:   Psychological ROS: positive for - irritability  Respiratory ROS: no cough, shortness of breath, or wheezing  Cardiovascular ROS: no chest pain or dyspnea on exertion  Pertinent items are noted in the History of Present Illness. All other Systems reviewed and are considered negative.            MENTAL STATUS EXAM & VITALS     MENTAL STATUS EXAM (MSE):    MSE FINDINGS ARE WITHIN NORMAL LIMITS (WNL) UNLESS OTHERWISE STATED BELOW. ( ALL OF THE BELOW CATEGORIES OF THE MSE HAVE BEEN REVIEWED (reviewed 5/18/2018) AND UPDATED AS DEEMED APPROPRIATE )  General Presentation age appropriate, cooperative   Orientation oriented to time, place and person   Vital Signs  See below (reviewed 5/18/2018); Vital Signs (BP, Pulse, & Temp) are within normal limits if not listed below.    Gait and Station Stable/steady, no ataxia   Musculoskeletal System No extrapyramidal symptoms (EPS); no abnormal muscular movements or Tardive Dyskinesia (TD); muscle strength and tone are within normal limits   Language No aphasia or dysarthria   Speech:  normal pitch and normal volume   Thought Processes concrete; normal rate of thoughts; poor abstract reasoning/computation   Thought Associations goal directed   Thought Content bizarre delusions   Suicidal Ideations none   Homicidal Ideations none   Mood:  irritable   Affect:  mood-congruent   Memory recent  fair   Memory remote:  fair   Concentration/Attention:  distractable   Fund of Knowledge average   Insight:  limited   Reliability fair   Judgment:  limited          VITALS:     Patient Vitals for the past 24 hrs:   Temp Pulse Resp BP SpO2   05/18/18 1134 98.1 °F (36.7 °C) 87 16 130/85 100 %   05/18/18 0800 98.4 °F (36.9 °C) 76 16 (!) 134/94 96 %   05/17/18 1932 97.9 °F (36.6 °C) 77 18 111/77 99 %   05/17/18 1609 98 °F (36.7 °C) 82 16 111/78 98 %     Wt Readings from Last 3 Encounters:   05/17/18 76.3 kg (168 lb 4.8 oz)   01/01/15 83.9 kg (185 lb)     Temp Readings from Last 3 Encounters:   05/18/18 98.1 °F (36.7 °C)   01/01/15 97.8 °F (36.6 °C)   12/27/13 97 °F (36.1 °C)     BP Readings from Last 3 Encounters:   05/18/18 130/85   01/01/15 103/65   12/27/13 118/83     Pulse Readings from Last 3 Encounters:   05/18/18 87   01/01/15 87   12/27/13 96            DATA     LABORATORY DATA:  Labs Reviewed   METABOLIC PANEL, COMPREHENSIVE - Abnormal; Notable for the following:        Result Value    BUN/Creatinine ratio 21 (*)     Protein, total 6.0 (*)     Albumin 3.0 (*)     A-G Ratio 1.0 (*)     All other components within normal limits   CBC W/O DIFF - Abnormal; Notable for the following:     WBC 3.2 (*)     All other components within normal limits   GLUCOSE, FASTING   TSH 3RD GENERATION   LIPID PANEL   HCG URINE, QL   URINALYSIS W/MICROSCOPIC   DRUG SCREEN, URINE     Admission on 05/17/2018   Component Date Value Ref Range Status    Sodium 05/18/2018 137  136 - 145 mmol/L Final    Potassium 05/18/2018 3.8  3.5 - 5.1 mmol/L Final    Chloride 05/18/2018 102  97 - 108 mmol/L Final    CO2 05/18/2018 28  21 - 32 mmol/L Final    Anion gap 05/18/2018 7  5 - 15 mmol/L Final    Glucose 05/18/2018 81  65 - 100 mg/dL Final    BUN 05/18/2018 12  6 - 20 MG/DL Final    Creatinine 05/18/2018 0.56  0.55 - 1.02 MG/DL Final    BUN/Creatinine ratio 05/18/2018 21* 12 - 20   Final    GFR est AA 05/18/2018 >60  >60 ml/min/1.73m2 Final    GFR est non-AA 05/18/2018 >60  >60 ml/min/1.73m2 Final    Calcium 05/18/2018 8.6  8.5 - 10.1 MG/DL Final    Bilirubin, total 05/18/2018 0.2  0.2 - 1.0 MG/DL Final    ALT (SGPT) 05/18/2018 20  12 - 78 U/L Final    AST (SGOT) 05/18/2018 17  15 - 37 U/L Final    Alk.  phosphatase 05/18/2018 95  45 - 117 U/L Final    Protein, total 05/18/2018 6.0* 6.4 - 8.2 g/dL Final    Albumin 05/18/2018 3.0* 3.5 - 5.0 g/dL Final    Globulin 05/18/2018 3.0  2.0 - 4.0 g/dL Final    A-G Ratio 05/18/2018 1.0* 1.1 - 2.2   Final    Glucose 05/18/2018 81  65 - 100 MG/DL Final    WBC 05/18/2018 3.2* 3.6 - 11.0 K/uL Final    RBC 05/18/2018 4.10  3.80 - 5.20 M/uL Final    HGB 05/18/2018 12.1  11.5 - 16.0 g/dL Final    HCT 05/18/2018 36.3  35.0 - 47.0 % Final    MCV 05/18/2018 88.5  80.0 - 99.0 FL Final    MCH 05/18/2018 29.5  26.0 - 34.0 PG Final    MCHC 05/18/2018 33.3  30.0 - 36.5 g/dL Final    RDW 05/18/2018 13.2  11.5 - 14.5 % Final    PLATELET 99/10/4977 689  150 - 400 K/uL Final    MPV 05/18/2018 9.0  8.9 - 12.9 FL Final    NRBC 05/18/2018 0.0  0  WBC Final    ABSOLUTE NRBC 05/18/2018 0.00  0.00 - 0.01 K/uL Final    TSH 05/18/2018 1.11  0.36 - 3.74 uIU/mL Final    LIPID PROFILE 05/18/2018        Final    Cholesterol, total 05/18/2018 132  <200 MG/DL Final    Triglyceride 05/18/2018 40  <150 MG/DL Final    HDL Cholesterol 05/18/2018 73  MG/DL Final    LDL, calculated 05/18/2018 51  0 - 100 MG/DL Final    VLDL, calculated 05/18/2018 8  MG/DL Final    CHOL/HDL Ratio 05/18/2018 1.8  0 - 5.0   Final        RADIOLOGY REPORTS:  No results found for this or any previous visit. No results found.            MEDICATIONS       ALL MEDICATIONS  Current Facility-Administered Medications   Medication Dose Route Frequency    divalproex DR (DEPAKOTE) tablet 250 mg  250 mg Oral QHS    ibuprofen (MOTRIN) tablet 800 mg  800 mg Oral Q8H PRN    [START ON 5/19/2018] levothyroxine (SYNTHROID) tablet 25 mcg  25 mcg Oral ACB    chlorproMAZINE (THORAZINE) tablet 200 mg  200 mg Oral TID    haloperidol decanoate (HALDOL DECANOATE) 100 mg/mL injection 100 mg  100 mg IntraMUSCular Q28D    [START ON 5/19/2018] senna (SENOKOT) tablet 8.6 mg  1 Tab Oral DAILY    OLANZapine (ZyPREXA) tablet 2.5 mg  2.5 mg Oral Q6H PRN    ziprasidone (GEODON) 10 mg in sterile water (preservative free) 0.5 mL injection  10 mg IntraMUSCular BID PRN    benztropine (COGENTIN) tablet 1 mg  1 mg Oral BID PRN    benztropine (COGENTIN) injection 1 mg  1 mg IntraMUSCular BID PRN    zolpidem (AMBIEN) tablet 5 mg  5 mg Oral QHS PRN    acetaminophen (TYLENOL) tablet 650 mg  650 mg Oral Q4H PRN    ibuprofen (MOTRIN) tablet 400 mg  400 mg Oral Q8H PRN    magnesium hydroxide (MILK OF MAGNESIA) 400 mg/5 mL oral suspension 30 mL  30 mL Oral DAILY PRN    nicotine (NICODERM CQ) 21 mg/24 hr patch 1 Patch  1 Patch TransDERmal DAILY PRN    cephALEXin (KEFLEX) capsule 500 mg  500 mg Oral Q12H    senna-docusate (PERICOLACE) 8.6-50 mg per tablet 1 Tab  1 Tab Oral QHS    levothyroxine (SYNTHROID) tablet 25 mcg  25 mcg Oral ACB    albuterol (PROVENTIL VENTOLIN) nebulizer solution 2.5 mg  2.5 mg Nebulization Q4H PRN    simvastatin (ZOCOR) tablet 20 mg  20 mg Oral QHS      SCHEDULED MEDICATIONS  Current Facility-Administered Medications   Medication Dose Route Frequency    divalproex DR (DEPAKOTE) tablet 250 mg  250 mg Oral QHS    [START ON 5/19/2018] levothyroxine (SYNTHROID) tablet 25 mcg  25 mcg Oral ACB    chlorproMAZINE (THORAZINE) tablet 200 mg  200 mg Oral TID    haloperidol decanoate (HALDOL DECANOATE) 100 mg/mL injection 100 mg  100 mg IntraMUSCular Q28D    [START ON 5/19/2018] senna (SENOKOT) tablet 8.6 mg  1 Tab Oral DAILY    cephALEXin (KEFLEX) capsule 500 mg  500 mg Oral Q12H    senna-docusate (PERICOLACE) 8.6-50 mg per tablet 1 Tab  1 Tab Oral QHS    levothyroxine (SYNTHROID) tablet 25 mcg  25 mcg Oral ACB    simvastatin (ZOCOR) tablet 20 mg  20 mg Oral QHS                ASSESSMENT & PLAN        The patient, Yadira Rodríguez, is a 75 y.o.  female who presents at this time for treatment of the following diagnoses:  Patient Active Hospital Problem List:   Bipolar disorder ([facility] [address] (5/17/2018)    Assessment: dario alternating with depression     Plan: mood stabilizer/ antipsychotic           I will continue to monitor blood levels (Depakote, Tegretol, lithium, clozapine---a drug with a narrow therapeutic index= NTI) and associated labs for drug therapy implemented that require intense monitoring for toxicity as deemed appropriate based on current medication side effects and pharmacodynamically determined drug 1/2 lives. A coordinated, multidisplinary treatment team (includes the nurse, unit pharmcist,  and writer) round was conducted for this initial evaluation with the patient present. The following regarding medications was addressed during rounds with patient: depakote   the risks and benefits of the proposed medication. The patient was given the opportunity to ask questions.  Informed consent given to the use of the above medications. I will continue to adjust psychiatric and non-psychiatric medications (see above \"medication\" section and orders section for details) as deemed appropriate & based upon diagnoses and response to treatment. I have reviewed admission (and previous/old) labs and medical tests in the EHR and or transferring hospital documents. I will continue to order blood tests/labs and diagnostic tests as deemed appropriate and review results as they become available (see orders for details). I have reviewed old psychiatric and medical records available in the EHR. I Will order additional psychiatric records from other institutions to further elucidate the nature of patient's psychopathology and review once available. I will gather additional collateral information from friends, family and o/p treatment team to further elucidate the nature of patient's psychopathology and baselline level of psychiatric functioning.       ESTIMATED LENGTH OF STAY:    3-5 days        STRENGTHS:  Access to housing/residential stability, Knowledge of medications and Motivated and ready for change                                        SIGNED:    Gary Bran MD  5/18/2018

## 2018-05-18 NOTE — PROGRESS NOTES
[address] [facility] 60  Master Treatment Plan for Ary Bowels    Date Treatment Plan Initiated: 05/18/18    Treatment Plan Modalities:  Type of Modality Amount  (x minutes) Frequency (x/week) Duration (x days) Name of Responsible Staff   [address] [facility] meetings to encourage peer interactions 15 7 1   Malathi LOONEY   Group psychotherapy to assist in building coping skills and internal controls 60 7 1 Nicolás Castillo   Therapeutic activity groups to build coping skills 60 7 1 Nicolás Castillo   Psychoeducation in group setting to address:   Medication education   15 7 1 Stephenie MENESES    Coping skills         Relaxation techniques         Symptom management         Discharge planning   [facility] 298   60 1 1 volunteer   Recovery/AA/NA   61 1 1 volunteer   Physician medication management   13 7 1 Dr. oMi Mauricio meeting/discharge planning   13 2 [address] [facility] [address] Will Be Met by 05/25/18      Problem: Altered Thought Process (Adult/Pediatric)  Goal: *STG: Participates in treatment plan  Outcome: Progressing Towards Goal  Patient denies SI. Med and meal compliant. Patient newly admitted. Mood is labile. Patient attended group. Cooperative. TDO hearing today and was involuntarily committed. She took this well. Patient goal: \"to take my medicine and go home soon\" Staff goal: to encourage ADLS. Patient uses walker to ambulate and is steady. Goal: *STG: Remains safe in hospital  Outcome: Progressing Towards Goal  Q 15 min safety rounds.    Goal: *STG: Complies with medication therapy  Outcome: Progressing Towards Goal  Compliant  Goal: *STG: Attends activities and groups  Outcome: Progressing Towards Goal    Attends groups  Goal: *STG: Decreased hallucinations  Outcome: Progressing Towards Goal  None observed   Goal: *LTG: Returns to baseline functioning  Outcome: Progressing Towards Goal  Working toward this. Patient newly admitted. Goal: Interventions  Outcome: Progressing Towards Goal  Medication management. Q 15 min safety rounds. Group therapy.

## 2018-05-18 NOTE — PROGRESS NOTES
Admission Medication Reconciliation:    Information obtained from:  Medication list faxed by local [facility] (see scanned document in MEDIA)    Comments/Recommendations: Updated PTA meds/reviewed patient's allergies. 1)  Changed:     - chlorpromazine 200mg QAM + 400mg QPM     - haloperidol decanoate 37.5mg IM every 4 weeks    2)  Removed:     - furosemide    3) Pharmacist was able to reach the [place] [facility] on 18. The patient's haloperidol decanoate injection was due 18. Significant PMH/Disease States:   Past Medical History:   Diagnosis Date    Asthma     Bipolar 1 disorder [facility])        Chief Complaint for this Admission:  No chief complaint on file. Allergies:  Review of patient's allergies indicates no known allergies. Prior to Admission Medications:   Prior to Admission Medications   Prescriptions Last Dose Informant Patient Reported? Taking? albuterol (PROVENTIL HFA, VENTOLIN HFA, PROAIR HFA) 90 mcg/actuation inhaler   Yes Yes   Sig: Take 1-2 Puffs by inhalation every four (4) hours as needed for Wheezing or Shortness of Breath. amLODIPine (NORVASC) 10 mg tablet   Yes No   Sig: Take 10 mg by mouth daily. chlorproMAZINE (THORAZINE) 200 mg tablet   Yes Yes   Sig: Take 200 mg by mouth daily. With 400mg QPM   chlorproMAZINE (THORAZINE) 200 mg tablet   Yes Yes   Sig: Take 400 mg by mouth every evening. divalproex DR (DEPAKOTE) 250 mg tablet   Yes Yes   Sig: Take 250 mg by mouth nightly.   haloperidol decanoate (HALDOL DECANOATE) 50 mg/mL injection   Yes Yes   Si.5 mg by IntraMUSCular route every four (4) weeks. Indications: Schizophrenia   ibuprofen (MOTRIN) 800 mg tablet   Yes Yes   Sig: Take 800 mg by mouth every eight (8) hours as needed for Pain.   levothyroxine (SYNTHROID) 25 mcg tablet   Yes Yes   Sig: Take 25 mcg by mouth Daily (before breakfast). losartan (COZAAR) 50 mg tablet   Yes Yes   Sig: Take 50 mg by mouth daily.    melatonin tab tablet   Yes Yes   Sig: Take 10 mg by mouth nightly. senna-docusate (PERICOLACE) 8.6-50 mg per tablet   Yes Yes   Sig: Take 2 Tabs by mouth two (2) times a day. simvastatin (ZOCOR) 20 mg tablet   Yes Yes   Sig: Take  by mouth nightly.       Facility-Administered Medications: None     Marco Antonio Mccann, PharmD, BCPP, [facility] Specialist, [address] [facility]

## 2018-05-19 PROCEDURE — 74011000250 HC RX REV CODE- 250: Performed by: FAMILY MEDICINE

## 2018-05-19 PROCEDURE — 74011250637 HC RX REV CODE- 250/637: Performed by: NURSE PRACTITIONER

## 2018-05-19 PROCEDURE — 74011250637 HC RX REV CODE- 250/637: Performed by: PSYCHIATRY & NEUROLOGY

## 2018-05-19 PROCEDURE — 65220000003 HC RM SEMIPRIVATE PSYCH

## 2018-05-19 PROCEDURE — 94640 AIRWAY INHALATION TREATMENT: CPT

## 2018-05-19 RX ORDER — BENZONATATE 100 MG/1
100 CAPSULE ORAL
Status: DISCONTINUED | OUTPATIENT
Start: 2018-05-19 | End: 2018-06-08 | Stop reason: HOSPADM

## 2018-05-19 RX ADMIN — STANDARDIZED SENNA CONCENTRATE AND DOCUSATE SODIUM 1 TABLET: 8.6; 5 TABLET, FILM COATED ORAL at 20:22

## 2018-05-19 RX ADMIN — CHLORPROMAZINE HYDROCHLORIDE 200 MG: 50 TABLET, SUGAR COATED ORAL at 09:06

## 2018-05-19 RX ADMIN — SIMVASTATIN 20 MG: 20 TABLET, FILM COATED ORAL at 20:21

## 2018-05-19 RX ADMIN — ALBUTEROL SULFATE 2.5 MG: 2.5 SOLUTION RESPIRATORY (INHALATION) at 10:46

## 2018-05-19 RX ADMIN — CEPHALEXIN 500 MG: 500 CAPSULE ORAL at 20:21

## 2018-05-19 RX ADMIN — CEPHALEXIN 500 MG: 500 CAPSULE ORAL at 09:06

## 2018-05-19 RX ADMIN — CHLORPROMAZINE HYDROCHLORIDE 400 MG: 50 TABLET, SUGAR COATED ORAL at 17:27

## 2018-05-19 RX ADMIN — DIVALPROEX SODIUM 250 MG: 125 TABLET, DELAYED RELEASE ORAL at 20:21

## 2018-05-19 RX ADMIN — LEVOTHYROXINE SODIUM 25 MCG: 25 TABLET ORAL at 06:48

## 2018-05-19 NOTE — PROGRESS NOTES
Problem: Altered Thought Process (Adult/Pediatric)  Goal: *STG: Participates in treatment plan  Outcome: Progressing Towards Goal  Pt meets with treatment team. Denies SI. Goal: *STG: Complies with medication therapy  Outcome: Progressing Towards Goal  Medication compliant  Goal: *STG: Attends activities and groups  Outcome: Progressing Towards Goal  Attends group  Goal: *STG: Decreased delusional thinking  Outcome: Progressing Towards Goal  None observed  Goal: *STG: Decreased hallucinations  Outcome: Progressing Towards Goal  None observed    Problem: Falls - Risk of  Goal: *Absence of Falls  Document Jovana Fall Risk and appropriate interventions in the flowsheet.    Outcome: Progressing Towards Goal  Fall Risk Interventions:  Mobility Interventions: Bed/chair exit alarm, Communicate number of staff needed for ambulation/transfer, Patient to call before getting OOB, Utilize walker, cane, or other assitive device         Medication Interventions: Bed/chair exit alarm, Patient to call before getting OOB, Teach patient to arise slowly

## 2018-05-19 NOTE — PROGRESS NOTES
2029 Called Hospitalist to see patient regarding patient coughing last three hours. Patient reported \"I need my inhaler\". Inhaler is listed on her PTA home meds, however, inhaler was not ordered by

## 2018-05-19 NOTE — PROGRESS NOTES
Problem: Altered Thought Process (Adult/Pediatric)  Goal: *STG: Decreased delusional thinking  Outcome: Not Progressing Towards Goal  1515 Received patient resting in bed, talking to self, coughing. On q 15 min. Safety checks. VSS. Slept until dinner. Ate dinner rapidly than vomited. Encouraged to eat slowly. Remains confused. Labile. Loud, pressured speech. Observed to talk to self. Patient reported \"family members were trying to kill people. Waiting for son to come get her\". Med compliant. Went to bed early.

## 2018-05-19 NOTE — INTERDISCIPLINARY ROUNDS
Behavioral Health Interdisciplinary Rounds     Patient Name: Yadira Rodríguez  Age: 75 y.o.   Room/Bed:  742/  [facility]: [facility]   Admission Status: Involuntary Commitment     Readmission within 30 days: no  Power of  in place: no  Patient requires a blocked bed: no          Reason for blocked bed:     VTE Prophylaxis: Not indicated    Mobility needs/Fall risk: yes    Nutritional Plan: no  Consults:          Labs/Testing due today?: no    Sleep hours: 5.0       Participation in Care/Groups:  yes  Medication Compliant?: Yes  PRNS (last 24 hours): None    Restraints (last 24 hours):  no     CIWA (range last 24 hours):     COWS (range last 24 hours):      Alcohol screening (AUDIT) completed -   AUDIT Score: 0     If applicable, date SBIRT discussed in treatment team AND documented:     Tobacco - patient is a smoker: Have You Used Tobacco in the Past 30 Days: No  Illegal Drugs use: Have You Used Any Illegal Substances Over the Past 12 Months: No    24 hour chart check complete: yes     Patient goal(s) for today:   Treatment team focus/goals:   Progress note     LOS:  2  Expected LOS:     Financial concerns/prescription coverage:    Date of last family contact:      Family requesting physician contact today:    Discharge plan:   Guns in the home:         Outpatient provider(s):     Participating treatment team members: Yadira Rodríguez, * (assigned SW),

## 2018-05-19 NOTE — BH NOTES
Psychiatric progress note            IDENTIFICATION:    Patient Name  James Dobbs   Date of Birth 1945   [facility] [id number]   Medical Record Number  [id number]      Age  75 y.o. PCP Antonette Horne MD   Admit date:  5/17/2018    Room Number  742/01  @ [facility]   Date of Service  5/19/2018            HISTORY         REASON FOR HOSPITALIZATION:  CC: \"agitation, delusions and dario\". Pt admitted under a temporary USP order (TDO)  For severe psychosis and dario proving to be an imminent danger to self and others and an inability to care for self. HISTORY OF PRESENT ILLNESS:    The patient, James Dobbs, is a 75 y.o. BLACK OR  female with a past psychiatric history significant for bipolar d/o , who presents at this time with complaints of (and/or evidence of) the following emotional symptoms: agitation, delusions and psychotic behavior. Additional symptomatology include agitation. The above symptoms have been present for 3 days . These symptoms are of severe severity. These symptoms are constant  in nature. The patient's condition has been precipitated by medication adjustments and psychosocial stressors (family conflicts  ). Patient's condition made worse by treatment noncompliance. UDS: MJ negative; BAL=0.     5/19/18 she is doing better and mood is ann marie and no anger issues and no self harm behavior sne she still has mood instability and gets angry and agitated sometimes      ALLERGIES: No Known Allergies   MEDICATIONS PRIOR TO ADMISSION:   Prescriptions Prior to Admission   Medication Sig    chlorproMAZINE (THORAZINE) 200 mg tablet Take 200 mg by mouth daily. With 400mg QPM    chlorproMAZINE (THORAZINE) 200 mg tablet Take 400 mg by mouth every evening.  haloperidol decanoate (HALDOL DECANOATE) 50 mg/mL injection 37.5 mg by IntraMUSCular route every four (4) weeks.  Indications: Schizophrenia    divalproex DR (DEPAKOTE) 250 mg tablet Take 250 mg by mouth nightly.  ibuprofen (MOTRIN) 800 mg tablet Take 800 mg by mouth every eight (8) hours as needed for Pain.  simvastatin (ZOCOR) 20 mg tablet Take  by mouth nightly.  levothyroxine (SYNTHROID) 25 mcg tablet Take 25 mcg by mouth Daily (before breakfast).  melatonin tab tablet Take 10 mg by mouth nightly.  losartan (COZAAR) 50 mg tablet Take 50 mg by mouth daily.  albuterol (PROVENTIL HFA, VENTOLIN HFA, PROAIR HFA) 90 mcg/actuation inhaler Take 1-2 Puffs by inhalation every four (4) hours as needed for Wheezing or Shortness of Breath.  senna-docusate (PERICOLACE) 8.6-50 mg per tablet Take 2 Tabs by mouth two (2) times a day.  amLODIPine (NORVASC) 10 mg tablet Take 10 mg by mouth daily. PAST MEDICAL HISTORY:   Past Medical History:   Diagnosis Date    Asthma     Bipolar 1 disorder ([facility]    No past surgical history on file. SOCIAL HISTORY:    Social History     Social History    Marital status:      Spouse name: N/A    Number of children: N/A    Years of education: N/A     Occupational History    Not on file. Social History Main Topics    Smoking status: Never Smoker    Smokeless tobacco: Not on file    Alcohol use No    Drug use: No    Sexual activity: Not Currently     Other Topics Concern    Not on file     Social History Narrative    75year old AA female admitted on TDO for agitation, making threats and having a fixed delusion that she is . She has been making racial remarks and placing herself in danger. Pt has hx of treatment with Depakote, Thorazine and Haldol Dec.      FAMILY HISTORY:    No family history on file. REVIEW OF SYSTEMS:   Psychological ROS: positive for - irritability  Respiratory ROS: no cough, shortness of breath, or wheezing  Cardiovascular ROS: no chest pain or dyspnea on exertion  Pertinent items are noted in the History of Present Illness. All other Systems reviewed and are considered negative.            MENTAL STATUS EXAM & VITALS     MENTAL STATUS EXAM (MSE):    MSE FINDINGS ARE WITHIN NORMAL LIMITS (WNL) UNLESS OTHERWISE STATED BELOW. ( ALL OF THE BELOW CATEGORIES OF THE MSE HAVE BEEN REVIEWED (reviewed 5/19/2018) AND UPDATED AS DEEMED APPROPRIATE )  General Presentation age appropriate, cooperative   Orientation oriented to time, place and person   Vital Signs  See below (reviewed 5/19/2018); Vital Signs (BP, Pulse, & Temp) are within normal limits if not listed below.    Gait and Station Stable/steady, no ataxia   Musculoskeletal System No extrapyramidal symptoms (EPS); no abnormal muscular movements or Tardive Dyskinesia (TD); muscle strength and tone are within normal limits   Language No aphasia or dysarthria   Speech:  normal pitch and normal volume   Thought Processes concrete; normal rate of thoughts; poor abstract reasoning/computation   Thought Associations goal directed   Thought Content Did not show delusion today    Suicidal Ideations none   Homicidal Ideations none   Mood:  irritable   Affect:  mood-congruent   Memory recent  fair   Memory remote:  fair   Concentration/Attention:  distractable   Fund of Knowledge average   Insight:  limited   Reliability fair   Judgment:  limited          VITALS:     Patient Vitals for the past 24 hrs:   Temp Pulse Resp BP SpO2   05/19/18 0755 97.9 °F (36.6 °C) 85 18 (!) 151/93 94 %   05/18/18 2020 98.2 °F (36.8 °C) 84 16 143/87 95 %   05/18/18 1628 98.5 °F (36.9 °C) 83 16 117/79 96 %     Wt Readings from Last 3 Encounters:   05/17/18 76.3 kg (168 lb 4.8 oz)   01/01/15 83.9 kg (185 lb)     Temp Readings from Last 3 Encounters:   05/19/18 97.9 °F (36.6 °C)   01/01/15 97.8 °F (36.6 °C)   12/27/13 97 °F (36.1 °C)     BP Readings from Last 3 Encounters:   05/19/18 (!) 151/93   01/01/15 103/65   12/27/13 118/83     Pulse Readings from Last 3 Encounters:   05/19/18 85   01/01/15 87   12/27/13 96            DATA     LABORATORY DATA:  Labs Reviewed   METABOLIC PANEL, COMPREHENSIVE - Abnormal; Notable for the following:        Result Value    BUN/Creatinine ratio 21 (*)     Protein, total 6.0 (*)     Albumin 3.0 (*)     A-G Ratio 1.0 (*)     All other components within normal limits   CBC W/O DIFF - Abnormal; Notable for the following:     WBC 3.2 (*)     All other components within normal limits   GLUCOSE, FASTING   TSH 3RD GENERATION   LIPID PANEL   HCG URINE, QL   URINALYSIS W/MICROSCOPIC   DRUG SCREEN, URINE     Admission on 05/17/2018   Component Date Value Ref Range Status    Sodium 05/18/2018 137  136 - 145 mmol/L Final    Potassium 05/18/2018 3.8  3.5 - 5.1 mmol/L Final    Chloride 05/18/2018 102  97 - 108 mmol/L Final    CO2 05/18/2018 28  21 - 32 mmol/L Final    Anion gap 05/18/2018 7  5 - 15 mmol/L Final    Glucose 05/18/2018 81  65 - 100 mg/dL Final    BUN 05/18/2018 12  6 - 20 MG/DL Final    Creatinine 05/18/2018 0.56  0.55 - 1.02 MG/DL Final    BUN/Creatinine ratio 05/18/2018 21* 12 - 20   Final    GFR est AA 05/18/2018 >60  >60 ml/min/1.73m2 Final    GFR est non-AA 05/18/2018 >60  >60 ml/min/1.73m2 Final    Calcium 05/18/2018 8.6  8.5 - 10.1 MG/DL Final    Bilirubin, total 05/18/2018 0.2  0.2 - 1.0 MG/DL Final    ALT (SGPT) 05/18/2018 20  12 - 78 U/L Final    AST (SGOT) 05/18/2018 17  15 - 37 U/L Final    Alk.  phosphatase 05/18/2018 95  45 - 117 U/L Final    Protein, total 05/18/2018 6.0* 6.4 - 8.2 g/dL Final    Albumin 05/18/2018 3.0* 3.5 - 5.0 g/dL Final    Globulin 05/18/2018 3.0  2.0 - 4.0 g/dL Final    A-G Ratio 05/18/2018 1.0* 1.1 - 2.2   Final    Glucose 05/18/2018 81  65 - 100 MG/DL Final    WBC 05/18/2018 3.2* 3.6 - 11.0 K/uL Final    RBC 05/18/2018 4.10  3.80 - 5.20 M/uL Final    HGB 05/18/2018 12.1  11.5 - 16.0 g/dL Final    HCT 05/18/2018 36.3  35.0 - 47.0 % Final    MCV 05/18/2018 88.5  80.0 - 99.0 FL Final    MCH 05/18/2018 29.5  26.0 - 34.0 PG Final    MCHC 05/18/2018 33.3  30.0 - 36.5 g/dL Final    RDW 05/18/2018 13.2  11.5 - 14.5 % Final    PLATELET 27/45/5479 328  150 - 400 K/uL Final    MPV 05/18/2018 9.0  8.9 - 12.9 FL Final    NRBC 05/18/2018 0.0  0  WBC Final    ABSOLUTE NRBC 05/18/2018 0.00  0.00 - 0.01 K/uL Final    TSH 05/18/2018 1.11  0.36 - 3.74 uIU/mL Final    LIPID PROFILE 05/18/2018        Final    Cholesterol, total 05/18/2018 132  <200 MG/DL Final    Triglyceride 05/18/2018 40  <150 MG/DL Final    HDL Cholesterol 05/18/2018 73  MG/DL Final    LDL, calculated 05/18/2018 51  0 - 100 MG/DL Final    VLDL, calculated 05/18/2018 8  MG/DL Final    CHOL/HDL Ratio 05/18/2018 1.8  0 - 5.0   Final        RADIOLOGY REPORTS:    Results from Hospital Encounter encounter on 05/17/18   XR CHEST PA LAT   Narrative INDICATION:  cough     Exam: Chest 2 views. Comparison: None. Findings: Cardiomediastinal silhouette is normal. Pulmonary vasculature is not  engorged. No focal parenchymal opacities, effusions, or pneumothorax. Bony  thorax is intact. Impression Impression:  1. No acute cardiopulmonary disease      No results found.            MEDICATIONS       ALL MEDICATIONS  Current Facility-Administered Medications   Medication Dose Route Frequency    divalproex DR (DEPAKOTE) tablet 250 mg  250 mg Oral QHS    ibuprofen (MOTRIN) tablet 800 mg  800 mg Oral Q8H PRN    levothyroxine (SYNTHROID) tablet 25 mcg  25 mcg Oral ACB    chlorproMAZINE (THORAZINE) tablet 200 mg  200 mg Oral 7am    chlorproMAZINE (THORAZINE) tablet 400 mg  400 mg Oral QPM    albuterol (PROVENTIL VENTOLIN) nebulizer solution 2.5 mg  2.5 mg Nebulization Q4H PRN    OLANZapine (ZyPREXA) tablet 2.5 mg  2.5 mg Oral Q6H PRN    ziprasidone (GEODON) 10 mg in sterile water (preservative free) 0.5 mL injection  10 mg IntraMUSCular BID PRN    benztropine (COGENTIN) tablet 1 mg  1 mg Oral BID PRN    benztropine (COGENTIN) injection 1 mg  1 mg IntraMUSCular BID PRN    zolpidem (AMBIEN) tablet 5 mg  5 mg Oral QHS PRN    acetaminophen (TYLENOL) tablet 650 mg 650 mg Oral Q4H PRN    magnesium hydroxide (MILK OF MAGNESIA) 400 mg/5 mL oral suspension 30 mL  30 mL Oral DAILY PRN    nicotine (NICODERM CQ) 21 mg/24 hr patch 1 Patch  1 Patch TransDERmal DAILY PRN    cephALEXin (KEFLEX) capsule 500 mg  500 mg Oral Q12H    senna-docusate (PERICOLACE) 8.6-50 mg per tablet 1 Tab  1 Tab Oral QHS    simvastatin (ZOCOR) tablet 20 mg  20 mg Oral QHS      SCHEDULED MEDICATIONS  Current Facility-Administered Medications   Medication Dose Route Frequency    divalproex DR (DEPAKOTE) tablet 250 mg  250 mg Oral QHS    levothyroxine (SYNTHROID) tablet 25 mcg  25 mcg Oral ACB    chlorproMAZINE (THORAZINE) tablet 200 mg  200 mg Oral 7am    chlorproMAZINE (THORAZINE) tablet 400 mg  400 mg Oral QPM    cephALEXin (KEFLEX) capsule 500 mg  500 mg Oral Q12H    senna-docusate (PERICOLACE) 8.6-50 mg per tablet 1 Tab  1 Tab Oral QHS    simvastatin (ZOCOR) tablet 20 mg  20 mg Oral QHS                ASSESSMENT & PLAN        The patient, Benton Arrington, is a 75 y.o.  female who presents at this time for treatment of the following diagnoses:  Patient Active Hospital Problem List:   Bipolar disorder ([facility] (5/17/2018)    Assessment: dario alternating with depression     Plan: mood stabilizer/ antipsychotic   5/19/18 continue same medications            I will continue to monitor blood levels (Depakote, Tegretol, lithium, clozapine---a drug with a narrow therapeutic index= NTI) and associated labs for drug therapy implemented that require intense monitoring for toxicity as deemed appropriate based on current medication side effects and pharmacodynamically determined drug 1/2 lives. A coordinated, multidisplinary treatment team (includes the nurse, unit pharmcist,  and writer) round was conducted for this initial evaluation with the patient present.      The following regarding medications was addressed during rounds with patient: depakote   the risks and benefits of the proposed medication. The patient was given the opportunity to ask questions. Informed consent given to the use of the above medications. I will continue to adjust psychiatric and non-psychiatric medications (see above \"medication\" section and orders section for details) as deemed appropriate & based upon diagnoses and response to treatment. I have reviewed admission (and previous/old) labs and medical tests in the EHR and or transferring hospital documents. I will continue to order blood tests/labs and diagnostic tests as deemed appropriate and review results as they become available (see orders for details). I have reviewed old psychiatric and medical records available in the EHR. I Will order additional psychiatric records from other institutions to further elucidate the nature of patient's psychopathology and review once available. I will gather additional collateral information from friends, family and o/p treatment team to further elucidate the nature of patient's psychopathology and baselline level of psychiatric functioning.       ESTIMATED LENGTH OF STAY:    3-5 days        STRENGTHS:  Access to housing/residential stability, Knowledge of medications and Motivated and ready for change                                        SIGNED:    Nurys Nelson MD  5/19/2018

## 2018-05-19 NOTE — PROGRESS NOTES
Problem: Falls - Risk of  Goal: *Absence of Falls  Document Jovana Fall Risk and appropriate interventions in the flowsheet. Outcome: Progressing Towards Goal  Fall Risk Interventions:  Mobility Interventions: Bed/chair exit alarm, Utilize walker, cane, or other assitive device  In bed asleep with respirations noted as even and unlabored as chest was rising and falling. Will continue to monitor for safety throughout shift for safety and 15 minute checks.        Medication Interventions: Bed/chair exit alarm, Teach patient to arise slowly

## 2018-05-20 PROCEDURE — 74011250637 HC RX REV CODE- 250/637: Performed by: NURSE PRACTITIONER

## 2018-05-20 PROCEDURE — 74011000250 HC RX REV CODE- 250: Performed by: PSYCHIATRY & NEUROLOGY

## 2018-05-20 PROCEDURE — 74011250637 HC RX REV CODE- 250/637: Performed by: PSYCHIATRY & NEUROLOGY

## 2018-05-20 PROCEDURE — 74011250636 HC RX REV CODE- 250/636: Performed by: PSYCHIATRY & NEUROLOGY

## 2018-05-20 PROCEDURE — 65220000003 HC RM SEMIPRIVATE PSYCH

## 2018-05-20 RX ADMIN — OLANZAPINE 2.5 MG: 2.5 TABLET, FILM COATED ORAL at 13:18

## 2018-05-20 RX ADMIN — STANDARDIZED SENNA CONCENTRATE AND DOCUSATE SODIUM 1 TABLET: 8.6; 5 TABLET, FILM COATED ORAL at 21:12

## 2018-05-20 RX ADMIN — CHLORPROMAZINE HYDROCHLORIDE 200 MG: 50 TABLET, SUGAR COATED ORAL at 07:24

## 2018-05-20 RX ADMIN — CEPHALEXIN 500 MG: 500 CAPSULE ORAL at 10:13

## 2018-05-20 RX ADMIN — SIMVASTATIN 20 MG: 20 TABLET, FILM COATED ORAL at 21:12

## 2018-05-20 RX ADMIN — DIVALPROEX SODIUM 250 MG: 125 TABLET, DELAYED RELEASE ORAL at 21:13

## 2018-05-20 RX ADMIN — LEVOTHYROXINE SODIUM 25 MCG: 25 TABLET ORAL at 07:23

## 2018-05-20 RX ADMIN — CHLORPROMAZINE HYDROCHLORIDE 400 MG: 50 TABLET, SUGAR COATED ORAL at 17:22

## 2018-05-20 RX ADMIN — BENZONATATE 100 MG: 100 CAPSULE ORAL at 21:13

## 2018-05-20 RX ADMIN — ZOLPIDEM TARTRATE 5 MG: 5 TABLET ORAL at 23:44

## 2018-05-20 RX ADMIN — WATER 10 MG: 1 INJECTION INTRAMUSCULAR; INTRAVENOUS; SUBCUTANEOUS at 10:12

## 2018-05-20 RX ADMIN — CEPHALEXIN 500 MG: 500 CAPSULE ORAL at 21:12

## 2018-05-20 NOTE — BH NOTES
PRN Medication Documentation    Specific patient behavior that led to need for PRN medication: Severe agitation noted. Delusional.  Verbally abusive toward staff  Staff interventions attempted prior to PRN being given: stimulus reduction   PRN medication given: Geodon 10mg IM  Patient response/effectiveness of PRN medication: Patient resting comfortably and quietly at present. No aggression noted. (1113)    PRN Medication Documentation    Specific patient behavior that led to need for PRN medication: Patient yelling, anxious, utilizing belligerent language, delusional.    Staff interventions attempted prior to PRN being given: stimulus reduction  PRN medication given:  Zyprexa 2.5mg  Patient response/effectiveness of PRN medication: medication ineffective.   Patient continues to yell and utilize belligerent language

## 2018-05-20 NOTE — BH NOTES
Psychiatric progress note            IDENTIFICATION:    Patient Name  Curt Damon   Date of Birth 1945   [facility] [id number]   Medical Record Number  [id number]      Age  75 y.o. PCP Kimberlee Dominguez MD   Admit date:  5/17/2018    Room Number  742/01  @ [facility]   Date of Service  5/20/2018            HISTORY         REASON FOR HOSPITALIZATION:  CC: \"agitation, delusions and dario\". Pt admitted under a temporary FDC order (TDO)  For severe psychosis and dario proving to be an imminent danger to self and others and an inability to care for self. HISTORY OF PRESENT ILLNESS:    The patient, Curt Damon, is a 75 y.o. BLACK OR  female with a past psychiatric history significant for bipolar d/o , who presents at this time with complaints of (and/or evidence of) the following emotional symptoms: agitation, delusions and psychotic behavior. Additional symptomatology include agitation. The above symptoms have been present for 3 days . These symptoms are of severe severity. These symptoms are constant  in nature. The patient's condition has been precipitated by medication adjustments and psychosocial stressors (family conflicts  ). Patient's condition made worse by treatment noncompliance. UDS: MJ negative; BAL=0.     5/19/18 she is doing better and mood is ann marie and no anger issues and no self harm behavior sne she still has mood instability and gets angry and agitated sometimes  5/20/18 she is hyperverbal and agitated and she gets manic and rambling and she has flight of ideas , compliant with medications and no SI or I       ALLERGIES: No Known Allergies   MEDICATIONS PRIOR TO ADMISSION:   Prescriptions Prior to Admission   Medication Sig    chlorproMAZINE (THORAZINE) 200 mg tablet Take 200 mg by mouth daily. With 400mg QPM    chlorproMAZINE (THORAZINE) 200 mg tablet Take 400 mg by mouth every evening.     haloperidol decanoate (HALDOL DECANOATE) 50 mg/mL injection 37.5 mg by IntraMUSCular route every four (4) weeks. Indications: Schizophrenia    divalproex DR (DEPAKOTE) 250 mg tablet Take 250 mg by mouth nightly.  ibuprofen (MOTRIN) 800 mg tablet Take 800 mg by mouth every eight (8) hours as needed for Pain.  simvastatin (ZOCOR) 20 mg tablet Take  by mouth nightly.  levothyroxine (SYNTHROID) 25 mcg tablet Take 25 mcg by mouth Daily (before breakfast).  melatonin tab tablet Take 10 mg by mouth nightly.  losartan (COZAAR) 50 mg tablet Take 50 mg by mouth daily.  albuterol (PROVENTIL HFA, VENTOLIN HFA, PROAIR HFA) 90 mcg/actuation inhaler Take 1-2 Puffs by inhalation every four (4) hours as needed for Wheezing or Shortness of Breath.  senna-docusate (PERICOLACE) 8.6-50 mg per tablet Take 2 Tabs by mouth two (2) times a day.  amLODIPine (NORVASC) 10 mg tablet Take 10 mg by mouth daily. PAST MEDICAL HISTORY:   Past Medical History:   Diagnosis Date    Asthma     Bipolar 1 disorder ([facility] [address]    No past surgical history on file. SOCIAL HISTORY:    Social History     Social History    Marital status:      Spouse name: N/A    Number of children: N/A    Years of education: N/A     Occupational History    Not on file. Social History Main Topics    Smoking status: Never Smoker    Smokeless tobacco: Not on file    Alcohol use No    Drug use: No    Sexual activity: Not Currently     Other Topics Concern    Not on file     Social History Narrative    75year old AA female admitted on TDO for agitation, making threats and having a fixed delusion that she is . She has been making racial remarks and placing herself in danger. Pt has hx of treatment with Depakote, Thorazine and Haldol Dec.      FAMILY HISTORY:    No family history on file.     REVIEW OF SYSTEMS:   Psychological ROS: positive for - irritability  Respiratory ROS: no cough, shortness of breath, or wheezing  Cardiovascular ROS: no chest pain or dyspnea on exertion  Pertinent items are noted in the History of Present Illness. All other Systems reviewed and are considered negative. MENTAL STATUS EXAM & VITALS     MENTAL STATUS EXAM (MSE):    MSE FINDINGS ARE WITHIN NORMAL LIMITS (WNL) UNLESS OTHERWISE STATED BELOW. ( ALL OF THE BELOW CATEGORIES OF THE MSE HAVE BEEN REVIEWED (reviewed 5/20/2018) AND UPDATED AS DEEMED APPROPRIATE )  General Presentation age appropriate, cooperative   Orientation oriented to time, place and person   Vital Signs  See below (reviewed 5/20/2018); Vital Signs (BP, Pulse, & Temp) are within normal limits if not listed below.    Gait and Station Stable/steady, no ataxia   Musculoskeletal System No extrapyramidal symptoms (EPS); no abnormal muscular movements or Tardive Dyskinesia (TD); muscle strength and tone are within normal limits   Language No aphasia or dysarthria   Speech:  normal pitch and normal volume   Thought Processes concrete; normal rate of thoughts; poor abstract reasoning/computation   Thought Associations goal directed   Thought Content Did not show delusion today    Suicidal Ideations none   Homicidal Ideations none   Mood:  irritable   Affect:  mood-congruent   Memory recent  fair   Memory remote:  fair   Concentration/Attention:  distractable   Fund of Knowledge average   Insight:  limited   Reliability fair   Judgment:  limited          VITALS:     Patient Vitals for the past 24 hrs:   Temp Pulse Resp BP SpO2   05/20/18 0830 98.2 °F (36.8 °C) (!) 102 20 123/87 96 %   05/19/18 2034 97.8 °F (36.6 °C) 91 16 (!) 138/92 96 %   05/19/18 1530 97.7 °F (36.5 °C) 95 18 111/74 96 %     Wt Readings from Last 3 Encounters:   05/20/18 76.2 kg (168 lb)   01/01/15 83.9 kg (185 lb)     Temp Readings from Last 3 Encounters:   05/20/18 98.2 °F (36.8 °C)   01/01/15 97.8 °F (36.6 °C)   12/27/13 97 °F (36.1 °C)     BP Readings from Last 3 Encounters:   05/20/18 123/87   01/01/15 103/65   12/27/13 118/83     Pulse Readings from Last 3 Encounters: 05/20/18 (!) 102   01/01/15 87   12/27/13 96            DATA     LABORATORY DATA:  Labs Reviewed   METABOLIC PANEL, COMPREHENSIVE - Abnormal; Notable for the following:        Result Value    BUN/Creatinine ratio 21 (*)     Protein, total 6.0 (*)     Albumin 3.0 (*)     A-G Ratio 1.0 (*)     All other components within normal limits   CBC W/O DIFF - Abnormal; Notable for the following:     WBC 3.2 (*)     All other components within normal limits   GLUCOSE, FASTING   TSH 3RD GENERATION   LIPID PANEL     Admission on 05/17/2018   Component Date Value Ref Range Status    Sodium 05/18/2018 137  136 - 145 mmol/L Final    Potassium 05/18/2018 3.8  3.5 - 5.1 mmol/L Final    Chloride 05/18/2018 102  97 - 108 mmol/L Final    CO2 05/18/2018 28  21 - 32 mmol/L Final    Anion gap 05/18/2018 7  5 - 15 mmol/L Final    Glucose 05/18/2018 81  65 - 100 mg/dL Final    BUN 05/18/2018 12  6 - 20 MG/DL Final    Creatinine 05/18/2018 0.56  0.55 - 1.02 MG/DL Final    BUN/Creatinine ratio 05/18/2018 21* 12 - 20   Final    GFR est AA 05/18/2018 >60  >60 ml/min/1.73m2 Final    GFR est non-AA 05/18/2018 >60  >60 ml/min/1.73m2 Final    Calcium 05/18/2018 8.6  8.5 - 10.1 MG/DL Final    Bilirubin, total 05/18/2018 0.2  0.2 - 1.0 MG/DL Final    ALT (SGPT) 05/18/2018 20  12 - 78 U/L Final    AST (SGOT) 05/18/2018 17  15 - 37 U/L Final    Alk.  phosphatase 05/18/2018 95  45 - 117 U/L Final    Protein, total 05/18/2018 6.0* 6.4 - 8.2 g/dL Final    Albumin 05/18/2018 3.0* 3.5 - 5.0 g/dL Final    Globulin 05/18/2018 3.0  2.0 - 4.0 g/dL Final    A-G Ratio 05/18/2018 1.0* 1.1 - 2.2   Final    Glucose 05/18/2018 81  65 - 100 MG/DL Final    WBC 05/18/2018 3.2* 3.6 - 11.0 K/uL Final    RBC 05/18/2018 4.10  3.80 - 5.20 M/uL Final    HGB 05/18/2018 12.1  11.5 - 16.0 g/dL Final    HCT 05/18/2018 36.3  35.0 - 47.0 % Final    MCV 05/18/2018 88.5  80.0 - 99.0 FL Final    MCH 05/18/2018 29.5  26.0 - 34.0 PG Final    MCHC 05/18/2018 33.3  30.0 - 36.5 g/dL Final    RDW 05/18/2018 13.2  11.5 - 14.5 % Final    PLATELET 74/22/4555 628  150 - 400 K/uL Final    MPV 05/18/2018 9.0  8.9 - 12.9 FL Final    NRBC 05/18/2018 0.0  0  WBC Final    ABSOLUTE NRBC 05/18/2018 0.00  0.00 - 0.01 K/uL Final    TSH 05/18/2018 1.11  0.36 - 3.74 uIU/mL Final    LIPID PROFILE 05/18/2018        Final    Cholesterol, total 05/18/2018 132  <200 MG/DL Final    Triglyceride 05/18/2018 40  <150 MG/DL Final    HDL Cholesterol 05/18/2018 73  MG/DL Final    LDL, calculated 05/18/2018 51  0 - 100 MG/DL Final    VLDL, calculated 05/18/2018 8  MG/DL Final    CHOL/HDL Ratio 05/18/2018 1.8  0 - 5.0   Final        RADIOLOGY REPORTS:    Results from Hospital Encounter encounter on 05/17/18   XR CHEST PA LAT   Narrative INDICATION:  cough     Exam: Chest 2 views. Comparison: None. Findings: Cardiomediastinal silhouette is normal. Pulmonary vasculature is not  engorged. No focal parenchymal opacities, effusions, or pneumothorax. Bony  thorax is intact. Impression Impression:  1. No acute cardiopulmonary disease      No results found.            MEDICATIONS       ALL MEDICATIONS  Current Facility-Administered Medications   Medication Dose Route Frequency    benzonatate (TESSALON) capsule 100 mg  100 mg Oral TID PRN    divalproex DR (DEPAKOTE) tablet 250 mg  250 mg Oral QHS    ibuprofen (MOTRIN) tablet 800 mg  800 mg Oral Q8H PRN    levothyroxine (SYNTHROID) tablet 25 mcg  25 mcg Oral ACB    chlorproMAZINE (THORAZINE) tablet 200 mg  200 mg Oral 7am    chlorproMAZINE (THORAZINE) tablet 400 mg  400 mg Oral QPM    albuterol (PROVENTIL VENTOLIN) nebulizer solution 2.5 mg  2.5 mg Nebulization Q4H PRN    OLANZapine (ZyPREXA) tablet 2.5 mg  2.5 mg Oral Q6H PRN    ziprasidone (GEODON) 10 mg in sterile water (preservative free) 0.5 mL injection  10 mg IntraMUSCular BID PRN    benztropine (COGENTIN) tablet 1 mg  1 mg Oral BID PRN    benztropine (COGENTIN) injection 1 mg  1 mg IntraMUSCular BID PRN    zolpidem (AMBIEN) tablet 5 mg  5 mg Oral QHS PRN    acetaminophen (TYLENOL) tablet 650 mg  650 mg Oral Q4H PRN    magnesium hydroxide (MILK OF MAGNESIA) 400 mg/5 mL oral suspension 30 mL  30 mL Oral DAILY PRN    nicotine (NICODERM CQ) 21 mg/24 hr patch 1 Patch  1 Patch TransDERmal DAILY PRN    cephALEXin (KEFLEX) capsule 500 mg  500 mg Oral Q12H    senna-docusate (PERICOLACE) 8.6-50 mg per tablet 1 Tab  1 Tab Oral QHS    simvastatin (ZOCOR) tablet 20 mg  20 mg Oral QHS      SCHEDULED MEDICATIONS  Current Facility-Administered Medications   Medication Dose Route Frequency    divalproex DR (DEPAKOTE) tablet 250 mg  250 mg Oral QHS    levothyroxine (SYNTHROID) tablet 25 mcg  25 mcg Oral ACB    chlorproMAZINE (THORAZINE) tablet 200 mg  200 mg Oral 7am    chlorproMAZINE (THORAZINE) tablet 400 mg  400 mg Oral QPM    cephALEXin (KEFLEX) capsule 500 mg  500 mg Oral Q12H    senna-docusate (PERICOLACE) 8.6-50 mg per tablet 1 Tab  1 Tab Oral QHS    simvastatin (ZOCOR) tablet 20 mg  20 mg Oral QHS                ASSESSMENT & PLAN        The patient, Lu Coates, is a 75 y.o.  female who presents at this time for treatment of the following diagnoses:  Patient Active Hospital Problem List:   Bipolar disorder ([facility] [address] (5/17/2018)    Assessment: dario alternating with depression     Plan: mood stabilizer/ antipsychotic   5/19/18 continue same medications    5/20/18 continue same medication and check Depakote level tomorrow            I will continue to monitor blood levels (Depakote, Tegretol, lithium, clozapine---a drug with a narrow therapeutic index= NTI) and associated labs for drug therapy implemented that require intense monitoring for toxicity as deemed appropriate based on current medication side effects and pharmacodynamically determined drug 1/2 lives.          A coordinated, multidisplinary treatment team (includes the nurse, unit pharmcist, social worker and writer) round was conducted for this initial evaluation with the patient present. The following regarding medications was addressed during rounds with patient: depakote   the risks and benefits of the proposed medication. The patient was given the opportunity to ask questions. Informed consent given to the use of the above medications. I will continue to adjust psychiatric and non-psychiatric medications (see above \"medication\" section and orders section for details) as deemed appropriate & based upon diagnoses and response to treatment. I have reviewed admission (and previous/old) labs and medical tests in the EHR and or transferring hospital documents. I will continue to order blood tests/labs and diagnostic tests as deemed appropriate and review results as they become available (see orders for details). I have reviewed old psychiatric and medical records available in the EHR. I Will order additional psychiatric records from other institutions to further elucidate the nature of patient's psychopathology and review once available. I will gather additional collateral information from friends, family and o/p treatment team to further elucidate the nature of patient's psychopathology and baselline level of psychiatric functioning.       ESTIMATED LENGTH OF STAY:    3-5 days        STRENGTHS:  Access to housing/residential stability, Knowledge of medications and Motivated and ready for change                                        SIGNED:    Ellie Don MD  5/20/2018

## 2018-05-20 NOTE — PROGRESS NOTES
Problem: Altered Thought Process (Adult/Pediatric)  Goal: *STG: Decreased delusional thinking  Outcome: Not Progressing Towards Goal  Patient continues to have delusional thinking. Preoccupied with GOD and various people. Problem: Falls - Risk of  Goal: *Absence of Falls  Document Jovana Fall Risk and appropriate interventions in the flowsheet.    Outcome: Progressing Towards Goal  Fall Risk Interventions:  Mobility Interventions: Bed/chair exit alarm, Communicate number of staff needed for ambulation/transfer, Patient to call before getting OOB, Utilize walker, cane, or other assitive device         Medication Interventions: Bed/chair exit alarm, Teach patient to arise slowly

## 2018-05-20 NOTE — INTERDISCIPLINARY ROUNDS
Behavioral Health Interdisciplinary Rounds     Patient Name: Jazmin Rockwell  Age: 75 y.o.   Room/Bed:  742/01  Primary [facility]: [facility]   Admission Status: Involuntary Commitment     Readmission within 30 days: no  Power of  in place: no  Patient requires a blocked bed: no          Reason for blocked bed:     VTE Prophylaxis: Not indicated    Mobility needs/Fall risk: yes    Nutritional Plan: no  Consults:          Labs/Testing due today?: no    Sleep hours:  3.75      Participation in Care/Groups:  no  Medication Compliant?: Yes  PRNS (last 24 hours): None    Restraints (last 24 hours):  no     CIWA (range last 24 hours):     COWS (range last 24 hours):      Alcohol screening (AUDIT) completed -   AUDIT Score: 0     If applicable, date SBIRT discussed in treatment team AND documented:     Tobacco - patient is a smoker: Have You Used Tobacco in the Past 30 Days: No  Illegal Drugs use: Have You Used Any Illegal Substances Over the Past 12 Months: No    24 hour chart check complete: yes     Patient goal(s) for today:   Treatment team focus/goals:   Progress note     LOS:  3  Expected LOS:     Financial concerns/prescription coverage:    Date of last family contact:       Family requesting physician contact today:    Discharge plan:   Guns in the home:         Outpatient provider(s):     Participating treatment team members: Jazmin Rockwell, * (assigned SW),

## 2018-05-20 NOTE — PROGRESS NOTES
Problem: Falls - Risk of  Goal: *Absence of Falls  Document Jovana Fall Risk and appropriate interventions in the flowsheet. Outcome: Progressing Towards Goal  Fall Risk Interventions:  Mobility Interventions: Bed/chair exit alarm  In bed asleep with respirations noted as even and unlabored as chest was rising and falling. Will continue to monitor throughout shift for safety and 15 minute checks.        Medication Interventions: Bed/chair exit alarm, Patient to call before getting OOB, Teach patient to arise slowly

## 2018-05-20 NOTE — PROGRESS NOTES
Problem: Altered Thought Process (Adult/Pediatric)  Goal: *STG: Decreased hallucinations  Outcome: Not Progressing Towards Goal  Isolative to bedroom all shift. Continues to have paranoid, delusional hallucinations. Observed to talk loudly to people not present on unit. Rapid, Pressured speech. Loose thoughts. Preoccupied with sex; talking about having sex with a dog. Ate 75% dinner, fluids and ice cream. She has no teeth, no dentures, and eats soft foods. She was able to eat Hamburger on bun, mashed potatoes, ice cream.      Med compliant.

## 2018-05-21 PROCEDURE — 74011250637 HC RX REV CODE- 250/637: Performed by: NURSE PRACTITIONER

## 2018-05-21 PROCEDURE — 74011250637 HC RX REV CODE- 250/637: Performed by: PSYCHIATRY & NEUROLOGY

## 2018-05-21 PROCEDURE — 65220000003 HC RM SEMIPRIVATE PSYCH

## 2018-05-21 PROCEDURE — 74011250636 HC RX REV CODE- 250/636: Performed by: PSYCHIATRY & NEUROLOGY

## 2018-05-21 RX ORDER — HALOPERIDOL DECANOATE 50 MG/ML
50 INJECTION INTRAMUSCULAR
Status: DISCONTINUED | OUTPATIENT
Start: 2018-05-21 | End: 2018-06-01 | Stop reason: DRUGHIGH

## 2018-05-21 RX ADMIN — CEPHALEXIN 500 MG: 500 CAPSULE ORAL at 09:26

## 2018-05-21 RX ADMIN — CHLORPROMAZINE HYDROCHLORIDE 200 MG: 50 TABLET, SUGAR COATED ORAL at 09:21

## 2018-05-21 RX ADMIN — OLANZAPINE 2.5 MG: 2.5 TABLET, FILM COATED ORAL at 06:21

## 2018-05-21 RX ADMIN — BENZONATATE 100 MG: 100 CAPSULE ORAL at 06:29

## 2018-05-21 RX ADMIN — STANDARDIZED SENNA CONCENTRATE AND DOCUSATE SODIUM 1 TABLET: 8.6; 5 TABLET, FILM COATED ORAL at 20:36

## 2018-05-21 RX ADMIN — LEVOTHYROXINE SODIUM 25 MCG: 25 TABLET ORAL at 09:21

## 2018-05-21 RX ADMIN — HALOPERIDOL DECANOATE 50 MG: 50 INJECTION INTRAMUSCULAR at 16:46

## 2018-05-21 RX ADMIN — DIVALPROEX SODIUM 250 MG: 125 TABLET, DELAYED RELEASE ORAL at 20:35

## 2018-05-21 RX ADMIN — CHLORPROMAZINE HYDROCHLORIDE 400 MG: 50 TABLET, SUGAR COATED ORAL at 18:30

## 2018-05-21 RX ADMIN — LEVOTHYROXINE SODIUM 25 MCG: 25 TABLET ORAL at 06:34

## 2018-05-21 RX ADMIN — BENZONATATE 100 MG: 100 CAPSULE ORAL at 17:36

## 2018-05-21 RX ADMIN — SIMVASTATIN 20 MG: 20 TABLET, FILM COATED ORAL at 20:36

## 2018-05-21 NOTE — INTERDISCIPLINARY ROUNDS
Behavioral Health Interdisciplinary Rounds     Patient Name: Lu Coates  Age: 75 y.o. Room/Bed:  742/01  Primary Diagnosis: Bipolar disorder ([facility] [address]   Admission Status: Involuntary Commitment     Readmission within 30 days: no  Power of  in place: no  Patient requires a blocked bed: yes         Reason for blocked bed: disruptive, loud     VTE Prophylaxis: Not indicated    Mobility needs/Fall risk: yes    Nutritional Plan:   Consults:        Labs/Testing due today?: Yes , refused lab work     Sleep hours:  1 hour      Participation in Care/Groups:    Medication Compliant?:   PRNS (last 24 hours): Antipsychotic (PO), Antipsychotic (IM) and Sleep Aid    Restraints (last 24 hours):  no     CIWA (range last 24 hours):     COWS (range last 24 hours):      Alcohol screening (AUDIT) completed -   AUDIT Score: 0     If applicable, date SBIRT discussed in treatment team AND documented:     Tobacco - patient is a smoker: Have You Used Tobacco in the Past 30 Days: No  Illegal Drugs use: Have You Used Any Illegal Substances Over the Past 12 Months: No    24 hour chart check complete: yes     Patient goal(s) for today:   Treatment team focus/goals: Plan to titrate her medications. Progress note She remains loose , labile and delusional.      LOS:  4  Expected LOS: TBD     Financial concerns/prescription coverage:  Medicare   Date of last family contact:    Family requesting physician contact today  Discharge plan:  She will return home with her sister  Guns in the home: no       Outpatient provider(s):[facility]     Participating treatment team members: Tiny Pacheco Dr. -

## 2018-05-21 NOTE — PROGRESS NOTES
Problem: Falls - Risk of  Goal: *Absence of Falls  Document Jovana Fall Risk and appropriate interventions in the flowsheet.    Outcome: Progressing Towards Goal  Fall Risk Interventions:  Resting in bed talking aloud to self , cursing at times , Respirations even and unlabored , NAD noted   Q15 min safety rounds continue , Tap bell at bedside , bathroom light on   Mobility Interventions: Bed/chair exit alarm    Mentation Interventions: Adequate sleep, hydration, pain control    Medication Interventions: Bed/chair exit alarm

## 2018-05-21 NOTE — PROGRESS NOTES
Problem: Altered Thought Process (Adult/Pediatric)  Goal: *STG: Participates in treatment plan  Outcome: Progressing Towards Goal  Received this morning out on the unit. Is alert,but confused. Thoughts remains loose and speech is pressured and rapid. Seems to be pre-occupied with inappropriate sexual thoughts. Needing re-direction from staff. Has periods where she is  restless/manic,then has other times where she is less talkative and able to sit and watch TV. Has been medication and meal compliant.  called this morning,information left on the chart. Staff to continue to monitor patient and re-direct as needed. Goal: *STG: Complies with medication therapy  Outcome: Progressing Towards Goal  Has been medication and meal compliant.

## 2018-05-21 NOTE — PROGRESS NOTES
Problem: Falls - Risk of  Goal: *Absence of Falls  Document Jovana Fall Risk and appropriate interventions in the flowsheet. Outcome: Progressing Towards Goal  Free of falls. Falls tool completed and accurate. m on the bedFall Risk Interventions:  Mobility Interventions: Bed/chair exit alarm, Utilize walker, cane, or other assitive device    Mentation Interventions: Adequate sleep, hydration, pain control, Bed/chair exit alarm, Door open when patient unattended    Medication Interventions: Bed/chair exit alarm, Teach patient to arise slowly     Free of falls. Falls tool completed and accurate. Bed alarm on the bed. Patient able to ambulate with walker,gait is slightly unstable. Staff to monitor.

## 2018-05-21 NOTE — BH NOTES
GROUP THERAPY PROGRESS NOTE    Kayla Street did not participate in a 45 minute Acute Unit's Process Group, with a focus identifying feelings, planning for the rest of the day, and discussing discharge.

## 2018-05-21 NOTE — BH NOTES
Psychiatric progress note            IDENTIFICATION:    Patient Name  James Dobbs   Date of Birth 1945   [facility] [id number]   Medical Record Number  [id number]      Age  75 y.o. PCP Antonette Horne MD   Admit date:  5/17/2018    Room Number  742/01  @ [facility]   Date of Service  5/21/2018            HISTORY         REASON FOR HOSPITALIZATION:  CC: \"agitation, delusions and dario\". Pt admitted under a temporary prison order (TDO)  For severe psychosis and dario proving to be an imminent danger to self and others and an inability to care for self. HISTORY OF PRESENT ILLNESS:    The patient, James Dobbs, is a 75 y.o. BLACK OR  female with a past psychiatric history significant for bipolar d/o , who presents at this time with complaints of (and/or evidence of) the following emotional symptoms: agitation, delusions and psychotic behavior. Additional symptomatology include agitation. The above symptoms have been present for 3 days . These symptoms are of severe severity. These symptoms are constant  in nature. The patient's condition has been precipitated by medication adjustments and psychosocial stressors (family conflicts  ). Patient's condition made worse by treatment noncompliance. UDS: MJ negative; BAL=0.     5/19/18 she is doing better and mood is ann marie and no anger issues and no self harm behavior sne she still has mood instability and gets angry and agitated sometimes  5/20/18 she is hyperverbal and agitated and she gets manic and rambling and she has flight of ideas , compliant with medications and no SI or I    5/21/18- Yesterday had some agitation and poor directability. Mkes obscene comments under her breath. Can also be charming, pleasant, and quite cooperative. ALLERGIES: No Known Allergies   MEDICATIONS PRIOR TO ADMISSION:   Prescriptions Prior to Admission   Medication Sig    chlorproMAZINE (THORAZINE) 200 mg tablet Take 200 mg by mouth daily.  With 400mg QPM    chlorproMAZINE (THORAZINE) 200 mg tablet Take 400 mg by mouth every evening.  haloperidol decanoate (HALDOL DECANOATE) 50 mg/mL injection 37.5 mg by IntraMUSCular route every four (4) weeks. Indications: Schizophrenia    divalproex DR (DEPAKOTE) 250 mg tablet Take 250 mg by mouth nightly.  ibuprofen (MOTRIN) 800 mg tablet Take 800 mg by mouth every eight (8) hours as needed for Pain.  simvastatin (ZOCOR) 20 mg tablet Take  by mouth nightly.  levothyroxine (SYNTHROID) 25 mcg tablet Take 25 mcg by mouth Daily (before breakfast).  melatonin tab tablet Take 10 mg by mouth nightly.  losartan (COZAAR) 50 mg tablet Take 50 mg by mouth daily.  albuterol (PROVENTIL HFA, VENTOLIN HFA, PROAIR HFA) 90 mcg/actuation inhaler Take 1-2 Puffs by inhalation every four (4) hours as needed for Wheezing or Shortness of Breath.  senna-docusate (PERICOLACE) 8.6-50 mg per tablet Take 2 Tabs by mouth two (2) times a day.  amLODIPine (NORVASC) 10 mg tablet Take 10 mg by mouth daily. PAST MEDICAL HISTORY:   Past Medical History:   Diagnosis Date    Asthma     Bipolar 1 disorder ([address]    No past surgical history on file. SOCIAL HISTORY:    Social History     Social History    Marital status:      Spouse name: N/A    Number of children: N/A    Years of education: N/A     Occupational History    Not on file. Social History Main Topics    Smoking status: Never Smoker    Smokeless tobacco: Not on file    Alcohol use No    Drug use: No    Sexual activity: Not Currently     Other Topics Concern    Not on file     Social History Narrative    75year old AA female admitted on TDO for agitation, making threats and having a fixed delusion that she is . She has been making racial remarks and placing herself in danger. Pt has hx of treatment with Depakote, Thorazine and Haldol Dec.      FAMILY HISTORY:    No family history on file.     REVIEW OF SYSTEMS:   Psychological ROS: positive for - irritability  Respiratory ROS: no cough, shortness of breath, or wheezing  Cardiovascular ROS: no chest pain or dyspnea on exertion  Pertinent items are noted in the History of Present Illness. All other Systems reviewed and are considered negative. MENTAL STATUS EXAM & VITALS     MENTAL STATUS EXAM (MSE):    MSE FINDINGS ARE WITHIN NORMAL LIMITS (WNL) UNLESS OTHERWISE STATED BELOW. ( ALL OF THE BELOW CATEGORIES OF THE MSE HAVE BEEN REVIEWED (reviewed 5/21/2018) AND UPDATED AS DEEMED APPROPRIATE )  General Presentation age appropriate, cooperative   Orientation oriented to time, place and person   Vital Signs  See below (reviewed 5/21/2018); Vital Signs (BP, Pulse, & Temp) are within normal limits if not listed below.    Gait and Station Stable/steady, no ataxia   Musculoskeletal System No extrapyramidal symptoms (EPS); no abnormal muscular movements or Tardive Dyskinesia (TD); muscle strength and tone are within normal limits   Language No aphasia or dysarthria   Speech:  normal pitch and normal volume   Thought Processes concrete; normal rate of thoughts; poor abstract reasoning/computation   Thought Associations goal directed   Thought Content Did not show delusion today    Suicidal Ideations none   Homicidal Ideations none   Mood:  irritable   Affect:  mood-congruent   Memory recent  fair   Memory remote:  fair   Concentration/Attention:  distractable   Fund of Knowledge average   Insight:  limited   Reliability fair   Judgment:  limited          VITALS:     Patient Vitals for the past 24 hrs:   Temp Pulse Resp BP SpO2   05/20/18 2349 97.8 °F (36.6 °C) (!) 101 18 (!) 163/91 -   05/20/18 1923 97.1 °F (36.2 °C) (!) 103 20 128/74 -   05/20/18 1540 98 °F (36.7 °C) (!) 103 20 138/81 97 %     Wt Readings from Last 3 Encounters:   05/20/18 76.2 kg (168 lb)   01/01/15 83.9 kg (185 lb)     Temp Readings from Last 3 Encounters:   05/20/18 97.8 °F (36.6 °C)   01/01/15 97.8 °F (36.6 °C)   12/27/13 97 °F (36.1 °C)     BP Readings from Last 3 Encounters:   05/20/18 (!) 163/91   01/01/15 103/65   12/27/13 118/83     Pulse Readings from Last 3 Encounters:   05/20/18 (!) 101   01/01/15 87   12/27/13 96            DATA     LABORATORY DATA:  Labs Reviewed   METABOLIC PANEL, COMPREHENSIVE - Abnormal; Notable for the following:        Result Value    BUN/Creatinine ratio 21 (*)     Protein, total 6.0 (*)     Albumin 3.0 (*)     A-G Ratio 1.0 (*)     All other components within normal limits   CBC W/O DIFF - Abnormal; Notable for the following:     WBC 3.2 (*)     All other components within normal limits   GLUCOSE, FASTING   TSH 3RD GENERATION   LIPID PANEL   VALPROIC ACID     Admission on 05/17/2018   Component Date Value Ref Range Status    Sodium 05/18/2018 137  136 - 145 mmol/L Final    Potassium 05/18/2018 3.8  3.5 - 5.1 mmol/L Final    Chloride 05/18/2018 102  97 - 108 mmol/L Final    CO2 05/18/2018 28  21 - 32 mmol/L Final    Anion gap 05/18/2018 7  5 - 15 mmol/L Final    Glucose 05/18/2018 81  65 - 100 mg/dL Final    BUN 05/18/2018 12  6 - 20 MG/DL Final    Creatinine 05/18/2018 0.56  0.55 - 1.02 MG/DL Final    BUN/Creatinine ratio 05/18/2018 21* 12 - 20   Final    GFR est AA 05/18/2018 >60  >60 ml/min/1.73m2 Final    GFR est non-AA 05/18/2018 >60  >60 ml/min/1.73m2 Final    Calcium 05/18/2018 8.6  8.5 - 10.1 MG/DL Final    Bilirubin, total 05/18/2018 0.2  0.2 - 1.0 MG/DL Final    ALT (SGPT) 05/18/2018 20  12 - 78 U/L Final    AST (SGOT) 05/18/2018 17  15 - 37 U/L Final    Alk.  phosphatase 05/18/2018 95  45 - 117 U/L Final    Protein, total 05/18/2018 6.0* 6.4 - 8.2 g/dL Final    Albumin 05/18/2018 3.0* 3.5 - 5.0 g/dL Final    Globulin 05/18/2018 3.0  2.0 - 4.0 g/dL Final    A-G Ratio 05/18/2018 1.0* 1.1 - 2.2   Final    Glucose 05/18/2018 81  65 - 100 MG/DL Final    WBC 05/18/2018 3.2* 3.6 - 11.0 K/uL Final    RBC 05/18/2018 4.10  3.80 - 5.20 M/uL Final    HGB 05/18/2018 12.1  11.5 - 16.0 g/dL Final    HCT 05/18/2018 36.3  35.0 - 47.0 % Final    MCV 05/18/2018 88.5  80.0 - 99.0 FL Final    MCH 05/18/2018 29.5  26.0 - 34.0 PG Final    MCHC 05/18/2018 33.3  30.0 - 36.5 g/dL Final    RDW 05/18/2018 13.2  11.5 - 14.5 % Final    PLATELET 31/96/3059 988  150 - 400 K/uL Final    MPV 05/18/2018 9.0  8.9 - 12.9 FL Final    NRBC 05/18/2018 0.0  0  WBC Final    ABSOLUTE NRBC 05/18/2018 0.00  0.00 - 0.01 K/uL Final    TSH 05/18/2018 1.11  0.36 - 3.74 uIU/mL Final    LIPID PROFILE 05/18/2018        Final    Cholesterol, total 05/18/2018 132  <200 MG/DL Final    Triglyceride 05/18/2018 40  <150 MG/DL Final    HDL Cholesterol 05/18/2018 73  MG/DL Final    LDL, calculated 05/18/2018 51  0 - 100 MG/DL Final    VLDL, calculated 05/18/2018 8  MG/DL Final    CHOL/HDL Ratio 05/18/2018 1.8  0 - 5.0   Final        RADIOLOGY REPORTS:    Results from Hospital Encounter encounter on 05/17/18   XR CHEST PA LAT   Narrative INDICATION:  cough     Exam: Chest 2 views. Comparison: None. Findings: Cardiomediastinal silhouette is normal. Pulmonary vasculature is not  engorged. No focal parenchymal opacities, effusions, or pneumothorax. Bony  thorax is intact. Impression Impression:  1. No acute cardiopulmonary disease      No results found.            MEDICATIONS       ALL MEDICATIONS  Current Facility-Administered Medications   Medication Dose Route Frequency    benzonatate (TESSALON) capsule 100 mg  100 mg Oral TID PRN    divalproex DR (DEPAKOTE) tablet 250 mg  250 mg Oral QHS    ibuprofen (MOTRIN) tablet 800 mg  800 mg Oral Q8H PRN    levothyroxine (SYNTHROID) tablet 25 mcg  25 mcg Oral ACB    chlorproMAZINE (THORAZINE) tablet 200 mg  200 mg Oral 7am    chlorproMAZINE (THORAZINE) tablet 400 mg  400 mg Oral QPM    albuterol (PROVENTIL VENTOLIN) nebulizer solution 2.5 mg  2.5 mg Nebulization Q4H PRN    OLANZapine (ZyPREXA) tablet 2.5 mg  2.5 mg Oral Q6H PRN    ziprasidone (GEODON) 10 mg in sterile water (preservative free) 0.5 mL injection  10 mg IntraMUSCular BID PRN    benztropine (COGENTIN) tablet 1 mg  1 mg Oral BID PRN    benztropine (COGENTIN) injection 1 mg  1 mg IntraMUSCular BID PRN    zolpidem (AMBIEN) tablet 5 mg  5 mg Oral QHS PRN    acetaminophen (TYLENOL) tablet 650 mg  650 mg Oral Q4H PRN    magnesium hydroxide (MILK OF MAGNESIA) 400 mg/5 mL oral suspension 30 mL  30 mL Oral DAILY PRN    nicotine (NICODERM CQ) 21 mg/24 hr patch 1 Patch  1 Patch TransDERmal DAILY PRN    senna-docusate (PERICOLACE) 8.6-50 mg per tablet 1 Tab  1 Tab Oral QHS    simvastatin (ZOCOR) tablet 20 mg  20 mg Oral QHS      SCHEDULED MEDICATIONS  Current Facility-Administered Medications   Medication Dose Route Frequency    divalproex DR (DEPAKOTE) tablet 250 mg  250 mg Oral QHS    levothyroxine (SYNTHROID) tablet 25 mcg  25 mcg Oral ACB    chlorproMAZINE (THORAZINE) tablet 200 mg  200 mg Oral 7am    chlorproMAZINE (THORAZINE) tablet 400 mg  400 mg Oral QPM    senna-docusate (PERICOLACE) 8.6-50 mg per tablet 1 Tab  1 Tab Oral QHS    simvastatin (ZOCOR) tablet 20 mg  20 mg Oral QHS                ASSESSMENT & PLAN        The patient, Winsome Bardales, is a 75 y.o.  female who presents at this time for treatment of the following diagnoses:  Patient Active Hospital Problem List:   Bipolar disorder ([facility] (5/17/2018)    Assessment: dario alternating with depression     Plan: mood stabilizer/ antipsychotic   5/19/18 continue same medications    5/20/18 continue same medication and check Depakote level tomorrow            I will continue to monitor blood levels (Depakote, Tegretol, lithium, clozapine---a drug with a narrow therapeutic index= NTI) and associated labs for drug therapy implemented that require intense monitoring for toxicity as deemed appropriate based on current medication side effects and pharmacodynamically determined drug 1/2 lives.          A coordinated, multidisplinary treatment team (includes the nurse, unit pharmcist,  and writer) round was conducted for this initial evaluation with the patient present. The following regarding medications was addressed during rounds with patient: depakote   the risks and benefits of the proposed medication. The patient was given the opportunity to ask questions. Informed consent given to the use of the above medications. I will continue to adjust psychiatric and non-psychiatric medications (see above \"medication\" section and orders section for details) as deemed appropriate & based upon diagnoses and response to treatment. I have reviewed admission (and previous/old) labs and medical tests in the EHR and or transferring hospital documents. I will continue to order blood tests/labs and diagnostic tests as deemed appropriate and review results as they become available (see orders for details). I have reviewed old psychiatric and medical records available in the EHR. I Will order additional psychiatric records from other institutions to further elucidate the nature of patient's psychopathology and review once available. I will gather additional collateral information from friends, family and o/p treatment team to further elucidate the nature of patient's psychopathology and baselline level of psychiatric functioning.       ESTIMATED LENGTH OF STAY:    3-5 days        STRENGTHS:  Access to housing/residential stability, Knowledge of medications and Motivated and ready for change                                        SIGNED:    Radha Naqvi MD  5/21/2018

## 2018-05-21 NOTE — BH NOTES
PRN Medication Documentation  MEWS=2   Specific patient behavior that led to need for PRN medication: loud , pressured , talking aloud to self   Staff interventions attempted prior to PRN being given: dim light , quiet milieu ,  PRN medication given: Ambien 5 mg po   Patient response/effectiveness of PRN medication: little sleep this shift     PRN Medication Documentation  Specific patient behavior that led to need for PRN medication: loud talking to self  , cursing   Staff interventions attempted prior to PRN being given: redirection , diversion activities offered , limit setting   PRN medication given: Zyprexa 2.5 po   Patient response/effectiveness of PRN medication: minimal   results at this time     States\" the spirit not the body came into her room and sucked  my tit \"

## 2018-05-22 LAB — VALPROATE SERPL-MCNC: 44 UG/ML (ref 50–100)

## 2018-05-22 PROCEDURE — 74011250637 HC RX REV CODE- 250/637: Performed by: NURSE PRACTITIONER

## 2018-05-22 PROCEDURE — 80164 ASSAY DIPROPYLACETIC ACD TOT: CPT | Performed by: PSYCHIATRY & NEUROLOGY

## 2018-05-22 PROCEDURE — 36415 COLL VENOUS BLD VENIPUNCTURE: CPT | Performed by: PSYCHIATRY & NEUROLOGY

## 2018-05-22 PROCEDURE — 65220000003 HC RM SEMIPRIVATE PSYCH

## 2018-05-22 PROCEDURE — 74011250637 HC RX REV CODE- 250/637: Performed by: PSYCHIATRY & NEUROLOGY

## 2018-05-22 RX ADMIN — BENZONATATE 100 MG: 100 CAPSULE ORAL at 01:40

## 2018-05-22 RX ADMIN — STANDARDIZED SENNA CONCENTRATE AND DOCUSATE SODIUM 1 TABLET: 8.6; 5 TABLET, FILM COATED ORAL at 20:53

## 2018-05-22 RX ADMIN — CHLORPROMAZINE HYDROCHLORIDE 200 MG: 50 TABLET, SUGAR COATED ORAL at 05:41

## 2018-05-22 RX ADMIN — DIVALPROEX SODIUM 250 MG: 125 TABLET, DELAYED RELEASE ORAL at 20:54

## 2018-05-22 RX ADMIN — ZOLPIDEM TARTRATE 5 MG: 5 TABLET ORAL at 20:53

## 2018-05-22 RX ADMIN — OLANZAPINE 2.5 MG: 2.5 TABLET, FILM COATED ORAL at 01:40

## 2018-05-22 RX ADMIN — LEVOTHYROXINE SODIUM 25 MCG: 25 TABLET ORAL at 05:41

## 2018-05-22 RX ADMIN — CHLORPROMAZINE HYDROCHLORIDE 400 MG: 50 TABLET, SUGAR COATED ORAL at 17:51

## 2018-05-22 RX ADMIN — BENZONATATE 100 MG: 100 CAPSULE ORAL at 23:37

## 2018-05-22 RX ADMIN — ACETAMINOPHEN 650 MG: 325 TABLET ORAL at 08:05

## 2018-05-22 RX ADMIN — SIMVASTATIN 20 MG: 20 TABLET, FILM COATED ORAL at 20:53

## 2018-05-22 RX ADMIN — OLANZAPINE 2.5 MG: 2.5 TABLET, FILM COATED ORAL at 09:56

## 2018-05-22 NOTE — PROGRESS NOTES
Problem: Falls - Risk of  Goal: *Absence of Falls  Document Jovana Fall Risk and appropriate interventions in the flowsheet. Outcome: Progressing Towards Goal  Fall Risk Interventions:  Mobility Interventions: Bed/chair exit alarm, Patient to call before getting OOB    Mentation Interventions: Adequate sleep, hydration, pain control, Bed/chair exit alarm, Door open when patient unattended    Medication Interventions: Teach patient to arise slowly, Patient to call before getting OOB    2350 Pt appears asleep in bed. Respirations even and unlabored. Bed alarm on, call bell within reach, door remains open. Will monitor with Q 15 safety checks. 0140 PRN Medication Documentation    Specific patient behavior that led to need for PRN medication: talking to self, singing loudly, cough  Staff interventions attempted prior to PRN being given: calm environment, decreasing stimuli  PRN medication given: Zyprexa 2.5, Tessalon   Patient response/effectiveness of PRN medication:   0250 Pt appeared to sleep for aprox. 30 minutes and then began talking to herself quietly. No coughing noted. NAD noted.

## 2018-05-22 NOTE — PROGRESS NOTES
Problem: Altered Thought Process (Adult/Pediatric)  Goal: *STG: Attends activities and groups  Outcome: Progressing Towards Goal  Patient out taking meals in day room with other patients/staff; patient encouraged to not isolate herself in room and encouraged to attend groups and be active in treatment team; patient manic and very loud in day room this morning; patient given prn zyprexa after non-pharmacological interventions ineffective

## 2018-05-22 NOTE — BH NOTES
PRN Medication Documentation    Specific patient behavior that led to need for PRN medication: psychosis, internally stimulated  Staff interventions attempted prior to PRN being given: distraction, quiet place  PRN medication given: 2.5 mg Zyprexa PO  Patient response/effectiveness of PRN medication:

## 2018-05-22 NOTE — BH NOTES
Psychiatric progress note            IDENTIFICATION:    Patient Name  Sherif Rdz   Date of Birth 1945   [facility] [id number]   Medical Record Number  [id number]      Age  75 y.o. PCP Alicia Aparicio MD   Admit date:  5/17/2018    Room Number  742/01  @ [facility]   Date of Service  5/22/2018            HISTORY         REASON FOR HOSPITALIZATION:  CC: \"agitation, delusions and dario\". Pt admitted under a temporary [facility] order (TDO)  For severe psychosis and dario proving to be an imminent danger to self and others and an inability to care for self. HISTORY OF PRESENT ILLNESS:    The patient, Sherif Rdz, is a 75 y.o. BLACK OR  female with a past psychiatric history significant for bipolar d/o , who presents at this time with complaints of (and/or evidence of) the following emotional symptoms: agitation, delusions and psychotic behavior. Additional symptomatology include agitation. The above symptoms have been present for 3 days . These symptoms are of severe severity. These symptoms are constant  in nature. The patient's condition has been precipitated by medication adjustments and psychosocial stressors (family conflicts  ). Patient's condition made worse by treatment noncompliance. UDS: MJ negative; BAL=0.     5/19/18 she is doing better and mood is ann marie and no anger issues and no self harm behavior sne she still has mood instability and gets angry and agitated sometimes  5/20/18 she is hyperverbal and agitated and she gets manic and rambling and she has flight of ideas , compliant with medications and no SI or I    5/21/18- Yesterday had some agitation and poor directability. Mkes obscene comments under her breath. Can also be charming, pleasant, and quite cooperative. 5/22/18- Med and meal compliant. Redirectable. Visible in milieu.      ALLERGIES: No Known Allergies   MEDICATIONS PRIOR TO ADMISSION:   Prescriptions Prior to Admission   Medication Sig    chlorproMAZINE (THORAZINE) 200 mg tablet Take 200 mg by mouth daily. With 400mg QPM    chlorproMAZINE (THORAZINE) 200 mg tablet Take 400 mg by mouth every evening.  haloperidol decanoate (HALDOL DECANOATE) 50 mg/mL injection 37.5 mg by IntraMUSCular route every four (4) weeks. Indications: Schizophrenia    divalproex DR (DEPAKOTE) 250 mg tablet Take 250 mg by mouth nightly.  ibuprofen (MOTRIN) 800 mg tablet Take 800 mg by mouth every eight (8) hours as needed for Pain.  simvastatin (ZOCOR) 20 mg tablet Take 20 mg by mouth nightly.  levothyroxine (SYNTHROID) 25 mcg tablet Take 25 mcg by mouth Daily (before breakfast).  melatonin tab tablet Take 10 mg by mouth nightly.  losartan (COZAAR) 50 mg tablet Take 50 mg by mouth daily.  albuterol (PROVENTIL HFA, VENTOLIN HFA, PROAIR HFA) 90 mcg/actuation inhaler Take 1-2 Puffs by inhalation every four (4) hours as needed for Wheezing or Shortness of Breath.  senna-docusate (PERICOLACE) 8.6-50 mg per tablet Take 2 Tabs by mouth two (2) times a day.  amLODIPine (NORVASC) 10 mg tablet Take 10 mg by mouth daily. PAST MEDICAL HISTORY:   Past Medical History:   Diagnosis Date    Asthma     Bipolar 1 disorder ([facility] [address]    No past surgical history on file. SOCIAL HISTORY:    Social History     Social History    Marital status:      Spouse name: N/A    Number of children: N/A    Years of education: N/A     Occupational History    Not on file. Social History Main Topics    Smoking status: Never Smoker    Smokeless tobacco: Not on file    Alcohol use No    Drug use: No    Sexual activity: Not Currently     Other Topics Concern    Not on file     Social History Narrative    75year old AA female admitted on TDO for agitation, making threats and having a fixed delusion that she is . She has been making racial remarks and placing herself in danger.  Pt has hx of treatment with Depakote, Thorazine and Haldol Dec.      FAMILY HISTORY:    No family history on file. REVIEW OF SYSTEMS:   Psychological ROS: positive for - irritability  Respiratory ROS: no cough, shortness of breath, or wheezing  Cardiovascular ROS: no chest pain or dyspnea on exertion  Pertinent items are noted in the History of Present Illness. All other Systems reviewed and are considered negative. MENTAL STATUS EXAM & VITALS     MENTAL STATUS EXAM (MSE):    MSE FINDINGS ARE WITHIN NORMAL LIMITS (WNL) UNLESS OTHERWISE STATED BELOW. ( ALL OF THE BELOW CATEGORIES OF THE MSE HAVE BEEN REVIEWED (reviewed 5/22/2018) AND UPDATED AS DEEMED APPROPRIATE )  General Presentation age appropriate, cooperative   Orientation oriented to time, place and person   Vital Signs  See below (reviewed 5/22/2018); Vital Signs (BP, Pulse, & Temp) are within normal limits if not listed below.    Gait and Station Stable/steady, no ataxia   Musculoskeletal System No extrapyramidal symptoms (EPS); no abnormal muscular movements or Tardive Dyskinesia (TD); muscle strength and tone are within normal limits   Language No aphasia or dysarthria   Speech:  normal pitch and normal volume   Thought Processes concrete; normal rate of thoughts; poor abstract reasoning/computation   Thought Associations goal directed   Thought Content Did not show delusion today    Suicidal Ideations none   Homicidal Ideations none   Mood:  irritable   Affect:  mood-congruent   Memory recent  fair   Memory remote:  fair   Concentration/Attention:  distractable   Fund of Knowledge average   Insight:  limited   Reliability fair   Judgment:  limited          VITALS:     Patient Vitals for the past 24 hrs:   Temp Pulse Resp BP SpO2   05/22/18 0823 97.7 °F (36.5 °C) 89 18 131/84 96 %   05/21/18 1642 98.2 °F (36.8 °C) 90 18 157/86 98 %     Wt Readings from Last 3 Encounters:   05/20/18 76.2 kg (168 lb)   01/01/15 83.9 kg (185 lb)     Temp Readings from Last 3 Encounters:   05/22/18 97.7 °F (36.5 °C)   01/01/15 97.8 °F (36.6 °C)   12/27/13 97 °F (36.1 °C)     BP Readings from Last 3 Encounters:   05/22/18 131/84   01/01/15 103/65   12/27/13 118/83     Pulse Readings from Last 3 Encounters:   05/22/18 89   01/01/15 87   12/27/13 96            DATA     LABORATORY DATA:  Labs Reviewed   METABOLIC PANEL, COMPREHENSIVE - Abnormal; Notable for the following:        Result Value    BUN/Creatinine ratio 21 (*)     Protein, total 6.0 (*)     Albumin 3.0 (*)     A-G Ratio 1.0 (*)     All other components within normal limits   CBC W/O DIFF - Abnormal; Notable for the following:     WBC 3.2 (*)     All other components within normal limits   VALPROIC ACID - Abnormal; Notable for the following:     Valproic acid 44 (*)     All other components within normal limits   GLUCOSE, FASTING   TSH 3RD GENERATION   LIPID PANEL     Admission on 05/17/2018   Component Date Value Ref Range Status    Sodium 05/18/2018 137  136 - 145 mmol/L Final    Potassium 05/18/2018 3.8  3.5 - 5.1 mmol/L Final    Chloride 05/18/2018 102  97 - 108 mmol/L Final    CO2 05/18/2018 28  21 - 32 mmol/L Final    Anion gap 05/18/2018 7  5 - 15 mmol/L Final    Glucose 05/18/2018 81  65 - 100 mg/dL Final    BUN 05/18/2018 12  6 - 20 MG/DL Final    Creatinine 05/18/2018 0.56  0.55 - 1.02 MG/DL Final    BUN/Creatinine ratio 05/18/2018 21* 12 - 20   Final    GFR est AA 05/18/2018 >60  >60 ml/min/1.73m2 Final    GFR est non-AA 05/18/2018 >60  >60 ml/min/1.73m2 Final    Calcium 05/18/2018 8.6  8.5 - 10.1 MG/DL Final    Bilirubin, total 05/18/2018 0.2  0.2 - 1.0 MG/DL Final    ALT (SGPT) 05/18/2018 20  12 - 78 U/L Final    AST (SGOT) 05/18/2018 17  15 - 37 U/L Final    Alk.  phosphatase 05/18/2018 95  45 - 117 U/L Final    Protein, total 05/18/2018 6.0* 6.4 - 8.2 g/dL Final    Albumin 05/18/2018 3.0* 3.5 - 5.0 g/dL Final    Globulin 05/18/2018 3.0  2.0 - 4.0 g/dL Final    A-G Ratio 05/18/2018 1.0* 1.1 - 2.2   Final    Glucose 05/18/2018 81  65 - 100 MG/DL Final    WBC 05/18/2018 3.2* 3.6 - 11.0 K/uL Final    RBC 05/18/2018 4.10  3.80 - 5.20 M/uL Final    HGB 05/18/2018 12.1  11.5 - 16.0 g/dL Final    HCT 05/18/2018 36.3  35.0 - 47.0 % Final    MCV 05/18/2018 88.5  80.0 - 99.0 FL Final    MCH 05/18/2018 29.5  26.0 - 34.0 PG Final    MCHC 05/18/2018 33.3  30.0 - 36.5 g/dL Final    RDW 05/18/2018 13.2  11.5 - 14.5 % Final    PLATELET 41/44/5070 991  150 - 400 K/uL Final    MPV 05/18/2018 9.0  8.9 - 12.9 FL Final    NRBC 05/18/2018 0.0  0  WBC Final    ABSOLUTE NRBC 05/18/2018 0.00  0.00 - 0.01 K/uL Final    TSH 05/18/2018 1.11  0.36 - 3.74 uIU/mL Final    LIPID PROFILE 05/18/2018        Final    Cholesterol, total 05/18/2018 132  <200 MG/DL Final    Triglyceride 05/18/2018 40  <150 MG/DL Final    HDL Cholesterol 05/18/2018 73  MG/DL Final    LDL, calculated 05/18/2018 51  0 - 100 MG/DL Final    VLDL, calculated 05/18/2018 8  MG/DL Final    CHOL/HDL Ratio 05/18/2018 1.8  0 - 5.0   Final    Valproic acid 05/22/2018 44* 50 - 100 ug/ml Final        RADIOLOGY REPORTS:    Results from Hospital Encounter encounter on 05/17/18   XR CHEST PA LAT   Narrative INDICATION:  cough     Exam: Chest 2 views. Comparison: None. Findings: Cardiomediastinal silhouette is normal. Pulmonary vasculature is not  engorged. No focal parenchymal opacities, effusions, or pneumothorax. Bony  thorax is intact. Impression Impression:  1. No acute cardiopulmonary disease      No results found.            MEDICATIONS       ALL MEDICATIONS  Current Facility-Administered Medications   Medication Dose Route Frequency    haloperidol decanoate (HALDOL DECANOATE) 50 mg/mL injection 50 mg  50 mg IntraMUSCular Q28D    benzonatate (TESSALON) capsule 100 mg  100 mg Oral TID PRN    divalproex DR (DEPAKOTE) tablet 250 mg  250 mg Oral QHS    ibuprofen (MOTRIN) tablet 800 mg  800 mg Oral Q8H PRN    levothyroxine (SYNTHROID) tablet 25 mcg  25 mcg Oral ACB    chlorproMAZINE (THORAZINE) tablet 200 mg  200 mg Oral 7am    chlorproMAZINE (THORAZINE) tablet 400 mg  400 mg Oral QPM    albuterol (PROVENTIL VENTOLIN) nebulizer solution 2.5 mg  2.5 mg Nebulization Q4H PRN    OLANZapine (ZyPREXA) tablet 2.5 mg  2.5 mg Oral Q6H PRN    ziprasidone (GEODON) 10 mg in sterile water (preservative free) 0.5 mL injection  10 mg IntraMUSCular BID PRN    benztropine (COGENTIN) tablet 1 mg  1 mg Oral BID PRN    benztropine (COGENTIN) injection 1 mg  1 mg IntraMUSCular BID PRN    zolpidem (AMBIEN) tablet 5 mg  5 mg Oral QHS PRN    acetaminophen (TYLENOL) tablet 650 mg  650 mg Oral Q4H PRN    magnesium hydroxide (MILK OF MAGNESIA) 400 mg/5 mL oral suspension 30 mL  30 mL Oral DAILY PRN    nicotine (NICODERM CQ) 21 mg/24 hr patch 1 Patch  1 Patch TransDERmal DAILY PRN    senna-docusate (PERICOLACE) 8.6-50 mg per tablet 1 Tab  1 Tab Oral QHS    simvastatin (ZOCOR) tablet 20 mg  20 mg Oral QHS      SCHEDULED MEDICATIONS  Current Facility-Administered Medications   Medication Dose Route Frequency    haloperidol decanoate (HALDOL DECANOATE) 50 mg/mL injection 50 mg  50 mg IntraMUSCular Q28D    divalproex DR (DEPAKOTE) tablet 250 mg  250 mg Oral QHS    levothyroxine (SYNTHROID) tablet 25 mcg  25 mcg Oral ACB    chlorproMAZINE (THORAZINE) tablet 200 mg  200 mg Oral 7am    chlorproMAZINE (THORAZINE) tablet 400 mg  400 mg Oral QPM    senna-docusate (PERICOLACE) 8.6-50 mg per tablet 1 Tab  1 Tab Oral QHS    simvastatin (ZOCOR) tablet 20 mg  20 mg Oral QHS                ASSESSMENT & PLAN        The patient, Ary Dempsey, is a 75 y.o.  female who presents at this time for treatment of the following diagnoses:  Patient Active Hospital Problem List:   Bipolar disorder ([facility] [address] (5/17/2018)    Assessment: dario alternating with depression     Plan: mood stabilizer/ antipsychotic   5/19/18 continue same medications    5/20/18 continue same medication and check Depakote level tomorrow            I will continue to monitor blood levels (Depakote, Tegretol, lithium, clozapine---a drug with a narrow therapeutic index= NTI) and associated labs for drug therapy implemented that require intense monitoring for toxicity as deemed appropriate based on current medication side effects and pharmacodynamically determined drug 1/2 lives. A coordinated, multidisplinary treatment team (includes the nurse, unit pharmcist,  and writer) round was conducted for this initial evaluation with the patient present. The following regarding medications was addressed during rounds with patient: depakote   the risks and benefits of the proposed medication. The patient was given the opportunity to ask questions. Informed consent given to the use of the above medications. I will continue to adjust psychiatric and non-psychiatric medications (see above \"medication\" section and orders section for details) as deemed appropriate & based upon diagnoses and response to treatment. I have reviewed admission (and previous/old) labs and medical tests in the EHR and or transferring hospital documents. I will continue to order blood tests/labs and diagnostic tests as deemed appropriate and review results as they become available (see orders for details). I have reviewed old psychiatric and medical records available in the EHR. I Will order additional psychiatric records from other institutions to further elucidate the nature of patient's psychopathology and review once available. I will gather additional collateral information from friends, family and o/p treatment team to further elucidate the nature of patient's psychopathology and baselline level of psychiatric functioning.       ESTIMATED LENGTH OF STAY:    3-5 days        STRENGTHS:  Access to housing/residential stability, Knowledge of medications and Motivated and ready for change                                        SIGNED:    Donald Aaron MD  5/22/2018

## 2018-05-22 NOTE — PROGRESS NOTES
Problem: Altered Thought Process (Adult/Pediatric)  Goal: *STG: Participates in treatment plan  Outcome: Progressing Towards Goal  Pt was in her room talking at the start of shift. Pt greeted staff with a smile and compliments. Pt came out to the dining area for dinner. Pt talking loudly out while eating dinner and began speaking inappropriately about using the bathroom and was assisted to her room. Pt then stayed in her room talking loudly at times. Pt compliant with meal and meds.

## 2018-05-22 NOTE — PROGRESS NOTES
Problem: Altered Thought Process (Adult/Pediatric)  Goal: *STG: Decreased hallucinations  Outcome: Not Progressing Towards Goal    Isolative to bed room most of shift. Observed to talk loudly to people not present on unit. She will say \"Maryann    Knows what you want, what you did to her\". Continues to be preoccupied with sex. Ate dinner late in DR . Ate 100% dinner, fluids and snacks. Med compliant. 1736 Received PRN Tessalon for coughing with  good results. Staff not able to draw Valproic Acid Lab due to patient was too Labile and could not keep still. Lab date & time changed for tomorrow.

## 2018-05-22 NOTE — BH NOTES
GROUP THERAPY PROGRESS NOTE    Winsome Bardales did not participate in a 45  minute morning Process Group on the Geriatric Unit with a focus on shifting ones feelings to a positive note and preparing for the day through group singing.

## 2018-05-23 PROCEDURE — 74011250637 HC RX REV CODE- 250/637: Performed by: PSYCHIATRY & NEUROLOGY

## 2018-05-23 PROCEDURE — 65220000003 HC RM SEMIPRIVATE PSYCH

## 2018-05-23 PROCEDURE — 74011250637 HC RX REV CODE- 250/637: Performed by: NURSE PRACTITIONER

## 2018-05-23 RX ADMIN — ZOLPIDEM TARTRATE 5 MG: 5 TABLET ORAL at 22:45

## 2018-05-23 RX ADMIN — SIMVASTATIN 20 MG: 20 TABLET, FILM COATED ORAL at 21:03

## 2018-05-23 RX ADMIN — STANDARDIZED SENNA CONCENTRATE AND DOCUSATE SODIUM 1 TABLET: 8.6; 5 TABLET, FILM COATED ORAL at 21:03

## 2018-05-23 RX ADMIN — CHLORPROMAZINE HYDROCHLORIDE 200 MG: 50 TABLET, SUGAR COATED ORAL at 06:17

## 2018-05-23 RX ADMIN — LEVOTHYROXINE SODIUM 25 MCG: 25 TABLET ORAL at 06:17

## 2018-05-23 RX ADMIN — CHLORPROMAZINE HYDROCHLORIDE 400 MG: 50 TABLET, SUGAR COATED ORAL at 17:00

## 2018-05-23 RX ADMIN — DIVALPROEX SODIUM 250 MG: 125 TABLET, DELAYED RELEASE ORAL at 21:03

## 2018-05-23 NOTE — PROGRESS NOTES
Problem: Falls - Risk of  Goal: *Absence of Falls  Document Jovana Fall Risk and appropriate interventions in the flowsheet. Outcome: Progressing Towards Goal  Fall Risk Interventions:  Mobility Interventions: Bed/chair exit alarm, Utilize walker, cane, or other assitive device    Mentation Interventions: Adequate sleep, hydration, pain control, Bed/chair exit alarm, Door open when patient unattended, Eyeglasses and hearing aids    Medication Interventions: Bed/chair exit alarm, Teach patient to arise slowly     Free of falls. Fall tool completed and accurate. Patient ambulating with walker assistance. Bed alarm on the bed.

## 2018-05-23 NOTE — BH NOTES
PRN Medication Documentation    Specific patient behavior that led to need for PRN medication: help sleeping  Staff interventions attempted prior to PRN being given: relaxation, deep breathing  PRN medication given: Ambien  Patient response/effectiveness of PRN medication: Good

## 2018-05-23 NOTE — PROGRESS NOTES
Problem: Altered Thought Process (Adult/Pediatric)  Goal: *STG: Participates in treatment plan  Pt was greeted in the bed resting with the covers over her head. Pt heard staff and said Angella  alone. \" Staff said \"okay\". Pt remained in her room quite for the next 15 minutes, then came out the the dining area for a few minutes and was pleasant, smiling and laughing. Pt ate dinner in her room. Pt did have times when she talked loudly in her room. Pt compliant with meal and meds.

## 2018-05-23 NOTE — BH NOTES
Noted to be coughing  during rounds and was awake stating that her chest was hurting. Vital signs taken T-97.6 , P-79, R-20, B/P-118/72 and 02 sats-95%. Offered medication for coughing.

## 2018-05-23 NOTE — INTERDISCIPLINARY ROUNDS
Behavioral Health Interdisciplinary Rounds     Patient Name: Lizandro Saab  Age: 75 y.o. Room/Bed:  742/01  [facility]: [facility]   Admission Status: Involuntary Commitment     Readmission within 30 days: no  Power of  in place: no  Patient requires a blocked bed: yes          Reason for blocked bed: talks to self loudly, sexually preoccupied, disruptive, labile     VTE Prophylaxis: Not indicated    Mobility needs/Fall risk: yes    Nutritional Plan: no  Consults: no         Labs/Testing due today?: no    Sleep hours: 2.75       Participation in Care/Groups:  yes  Medication Compliant?: Yes  PRNS (last 24 hours): Antipsychotic (PO), Sleep Aid and Pain, Tessalon     Restraints (last 24 hours):  no     CIWA (range last 24 hours):     COWS (range last 24 hours):      Alcohol screening (AUDIT) completed -   AUDIT Score: 0     If applicable, date SBIRT discussed in treatment team AND documented:     Tobacco - patient is a smoker: Have You Used Tobacco in the Past 30 Days: No  Illegal Drugs use: Have You Used Any Illegal Substances Over the Past 12 Months: No    24 hour chart check complete: yes     Patient goal(s) for today:   Treatment team focus/goals: Plan to titrate her medications. Progress note - she is better each day. Still is hyperverbal at times.       LOS:  6  Expected LOS: TBD     Financial concerns/prescription coverage:  medicare   Date of last family contact:  DRE called her       Family requesting physician contact today:    Discharge plan: she will return home with her   Guns in the home: no       Outpatient provider(s): Landen GRISSOM     Participating treatment team members: Lizandro Saab,  Zi Baldwin - Dr. Vannesa Pearson

## 2018-05-23 NOTE — BH NOTES
GROUP THERAPY PROGRESS NOTE    Ary Dempsey did not participate in a 45  minute morning Process Group on the Geriatric Unit with a focus on shifting ones feelings to a positive note and preparing for the day through group singing.

## 2018-05-23 NOTE — PROGRESS NOTES
Problem: Altered Thought Process (Adult/Pediatric)  Goal: *STG: Participates in treatment plan  Outcome: Progressing Towards Goal  Received this morning resting in bed. Woke alert,remains confused. Loose thoughts,remains hyper verbal-difficult for staff to understand all the patient is saying. Noted to have AH/VH/internal stimuli. Some inappropriate speech,but remains fairly pleasant and cooperative with staff and others. Requested bath water this morning and did her own am cares. Out to the unit for breakfast,then returned back to bed. Staff to continue to encourage activities and re-assure/re-direct patient as needed. Goal: *STG: Complies with medication therapy  Outcome: Progressing Towards Goal  Has been medication and meal compliant.

## 2018-05-23 NOTE — PROGRESS NOTES
Problem: Falls - Risk of  Goal: *Absence of Falls  Document Jovana Fall Risk and appropriate interventions in the flowsheet. Outcome: Progressing Towards Goal  Fall Risk Interventions:  Mobility Interventions: Patient to call before getting OOB, Utilize walker, cane, or other assitive device    Mentation Interventions: Adequate sleep, hydration, pain control, Door open when patient unattended, Room close to nurse's station    Medication Interventions: Patient to call before getting OOB, Teach patient to arise slowly    [phone] Pt appears asleep in bed. Respirations even and unlabored. Call bell within reach, night light on, door remains open. Will monitor with Q 15 safety checks.

## 2018-05-23 NOTE — BH NOTES
Psychiatric progress note            IDENTIFICATION:    Patient Name  Cherry Vargas   Date of Birth 1945   [facility] [id number]   Medical Record Number  [id number]      Age  75 y.o. PCP Shellie Wan MD   Admit date:  5/17/2018    Room Number  742/01  @ [facility]   Date of Service  5/23/2018            HISTORY         REASON FOR HOSPITALIZATION:  CC: \"agitation, delusions and dario\". Pt admitted under a temporary group home order (TDO)  For severe psychosis and dario proving to be an imminent danger to self and others and an inability to care for self. HISTORY OF PRESENT ILLNESS:    The patient, Cherry Vargas, is a 75 y.o. BLACK OR  female with a past psychiatric history significant for bipolar d/o , who presents at this time with complaints of (and/or evidence of) the following emotional symptoms: agitation, delusions and psychotic behavior. Additional symptomatology include agitation. The above symptoms have been present for 3 days . These symptoms are of severe severity. These symptoms are constant  in nature. The patient's condition has been precipitated by medication adjustments and psychosocial stressors (family conflicts  ). Patient's condition made worse by treatment noncompliance. UDS: MJ negative; BAL=0.     5/19/18 she is doing better and mood is ann marie and no anger issues and no self harm behavior sne she still has mood instability and gets angry and agitated sometimes  5/20/18 she is hyperverbal and agitated and she gets manic and rambling and she has flight of ideas , compliant with medications and no SI or I    5/21/18- Yesterday had some agitation and poor directability. Mkes obscene comments under her breath. Can also be charming, pleasant, and quite cooperative. 5/22/18- Med and meal compliant. Redirectable. Visible in milieu. 5/23/18-Very p;easant with maggie correa of humor. Social with peers. Has poor insight that she uses profanity and may offend others. But she is not doing this to be provocative. ALLERGIES: No Known Allergies   MEDICATIONS PRIOR TO ADMISSION:   Prescriptions Prior to Admission   Medication Sig    chlorproMAZINE (THORAZINE) 200 mg tablet Take 200 mg by mouth daily. With 400mg QPM    chlorproMAZINE (THORAZINE) 200 mg tablet Take 400 mg by mouth every evening.  haloperidol decanoate (HALDOL DECANOATE) 50 mg/mL injection 37.5 mg by IntraMUSCular route every four (4) weeks. Indications: Schizophrenia    divalproex DR (DEPAKOTE) 250 mg tablet Take 250 mg by mouth nightly.  ibuprofen (MOTRIN) 800 mg tablet Take 800 mg by mouth every eight (8) hours as needed for Pain.  simvastatin (ZOCOR) 20 mg tablet Take 20 mg by mouth nightly.  levothyroxine (SYNTHROID) 25 mcg tablet Take 25 mcg by mouth Daily (before breakfast).  melatonin tab tablet Take 10 mg by mouth nightly.  losartan (COZAAR) 50 mg tablet Take 50 mg by mouth daily.  albuterol (PROVENTIL HFA, VENTOLIN HFA, PROAIR HFA) 90 mcg/actuation inhaler Take 1-2 Puffs by inhalation every four (4) hours as needed for Wheezing or Shortness of Breath.  senna-docusate (PERICOLACE) 8.6-50 mg per tablet Take 2 Tabs by mouth two (2) times a day.  amLODIPine (NORVASC) 10 mg tablet Take 10 mg by mouth daily. PAST MEDICAL HISTORY:   Past Medical History:   Diagnosis Date    Asthma     Bipolar 1 disorder ([facility] [address]    No past surgical history on file. SOCIAL HISTORY:    Social History     Social History    Marital status:      Spouse name: N/A    Number of children: N/A    Years of education: N/A     Occupational History    Not on file.      Social History Main Topics    Smoking status: Never Smoker    Smokeless tobacco: Not on file    Alcohol use No    Drug use: No    Sexual activity: Not Currently     Other Topics Concern    Not on file     Social History Narrative    75year old AA female admitted on TDO for agitation, making threats and having a fixed delusion that she is . She has been making racial remarks and placing herself in danger. Pt has hx of treatment with Depakote, Thorazine and Haldol Dec.      FAMILY HISTORY:    No family history on file. REVIEW OF SYSTEMS:   Psychological ROS: positive for - irritability  Respiratory ROS: no cough, shortness of breath, or wheezing  Cardiovascular ROS: no chest pain or dyspnea on exertion  Pertinent items are noted in the History of Present Illness. All other Systems reviewed and are considered negative. MENTAL STATUS EXAM & VITALS     MENTAL STATUS EXAM (MSE):    MSE FINDINGS ARE WITHIN NORMAL LIMITS (WNL) UNLESS OTHERWISE STATED BELOW. ( ALL OF THE BELOW CATEGORIES OF THE MSE HAVE BEEN REVIEWED (reviewed 5/23/2018) AND UPDATED AS DEEMED APPROPRIATE )  General Presentation age appropriate, cooperative   Orientation oriented to time, place and person   Vital Signs  See below (reviewed 5/23/2018); Vital Signs (BP, Pulse, & Temp) are within normal limits if not listed below.    Gait and Station Stable/steady, no ataxia   Musculoskeletal System No extrapyramidal symptoms (EPS); no abnormal muscular movements or Tardive Dyskinesia (TD); muscle strength and tone are within normal limits   Language No aphasia or dysarthria   Speech:  normal pitch and normal volume   Thought Processes concrete; normal rate of thoughts; poor abstract reasoning/computation   Thought Associations goal directed   Thought Content Did not show delusion today    Suicidal Ideations none   Homicidal Ideations none   Mood:  irritable   Affect:  mood-congruent   Memory recent  fair   Memory remote:  fair   Concentration/Attention:  distractable   Fund of Knowledge average   Insight:  limited   Reliability fair   Judgment:  limited          VITALS:     Patient Vitals for the past 24 hrs:   Temp Pulse Resp BP SpO2   05/23/18 0807 97.6 °F (36.4 °C) 91 16 142/83 99 %   05/22/18 2010 98.5 °F (36.9 °C) 87 16 127/87 95 %   05/22/18 1628 97.8 °F (36.6 °C) 94 18 159/85 97 %     Wt Readings from Last 3 Encounters:   05/20/18 76.2 kg (168 lb)   01/01/15 83.9 kg (185 lb)     Temp Readings from Last 3 Encounters:   05/23/18 97.6 °F (36.4 °C)   01/01/15 97.8 °F (36.6 °C)   12/27/13 97 °F (36.1 °C)     BP Readings from Last 3 Encounters:   05/23/18 142/83   01/01/15 103/65   12/27/13 118/83     Pulse Readings from Last 3 Encounters:   05/23/18 91   01/01/15 87   12/27/13 96            DATA     LABORATORY DATA:  Labs Reviewed   METABOLIC PANEL, COMPREHENSIVE - Abnormal; Notable for the following:        Result Value    BUN/Creatinine ratio 21 (*)     Protein, total 6.0 (*)     Albumin 3.0 (*)     A-G Ratio 1.0 (*)     All other components within normal limits   CBC W/O DIFF - Abnormal; Notable for the following:     WBC 3.2 (*)     All other components within normal limits   VALPROIC ACID - Abnormal; Notable for the following:     Valproic acid 44 (*)     All other components within normal limits   GLUCOSE, FASTING   TSH 3RD GENERATION   LIPID PANEL     Admission on 05/17/2018   Component Date Value Ref Range Status    Sodium 05/18/2018 137  136 - 145 mmol/L Final    Potassium 05/18/2018 3.8  3.5 - 5.1 mmol/L Final    Chloride 05/18/2018 102  97 - 108 mmol/L Final    CO2 05/18/2018 28  21 - 32 mmol/L Final    Anion gap 05/18/2018 7  5 - 15 mmol/L Final    Glucose 05/18/2018 81  65 - 100 mg/dL Final    BUN 05/18/2018 12  6 - 20 MG/DL Final    Creatinine 05/18/2018 0.56  0.55 - 1.02 MG/DL Final    BUN/Creatinine ratio 05/18/2018 21* 12 - 20   Final    GFR est AA 05/18/2018 >60  >60 ml/min/1.73m2 Final    GFR est non-AA 05/18/2018 >60  >60 ml/min/1.73m2 Final    Calcium 05/18/2018 8.6  8.5 - 10.1 MG/DL Final    Bilirubin, total 05/18/2018 0.2  0.2 - 1.0 MG/DL Final    ALT (SGPT) 05/18/2018 20  12 - 78 U/L Final    AST (SGOT) 05/18/2018 17  15 - 37 U/L Final    Alk.  phosphatase 05/18/2018 95  45 - 117 U/L Final    Protein, total 05/18/2018 6.0* 6.4 - 8.2 g/dL Final    Albumin 05/18/2018 3.0* 3.5 - 5.0 g/dL Final    Globulin 05/18/2018 3.0  2.0 - 4.0 g/dL Final    A-G Ratio 05/18/2018 1.0* 1.1 - 2.2   Final    Glucose 05/18/2018 81  65 - 100 MG/DL Final    WBC 05/18/2018 3.2* 3.6 - 11.0 K/uL Final    RBC 05/18/2018 4.10  3.80 - 5.20 M/uL Final    HGB 05/18/2018 12.1  11.5 - 16.0 g/dL Final    HCT 05/18/2018 36.3  35.0 - 47.0 % Final    MCV 05/18/2018 88.5  80.0 - 99.0 FL Final    MCH 05/18/2018 29.5  26.0 - 34.0 PG Final    MCHC 05/18/2018 33.3  30.0 - 36.5 g/dL Final    RDW 05/18/2018 13.2  11.5 - 14.5 % Final    PLATELET 29/67/7513 115  150 - 400 K/uL Final    MPV 05/18/2018 9.0  8.9 - 12.9 FL Final    NRBC 05/18/2018 0.0  0  WBC Final    ABSOLUTE NRBC 05/18/2018 0.00  0.00 - 0.01 K/uL Final    TSH 05/18/2018 1.11  0.36 - 3.74 uIU/mL Final    LIPID PROFILE 05/18/2018        Final    Cholesterol, total 05/18/2018 132  <200 MG/DL Final    Triglyceride 05/18/2018 40  <150 MG/DL Final    HDL Cholesterol 05/18/2018 73  MG/DL Final    LDL, calculated 05/18/2018 51  0 - 100 MG/DL Final    VLDL, calculated 05/18/2018 8  MG/DL Final    CHOL/HDL Ratio 05/18/2018 1.8  0 - 5.0   Final    Valproic acid 05/22/2018 44* 50 - 100 ug/ml Final        RADIOLOGY REPORTS:    Results from Hospital Encounter encounter on 05/17/18   XR CHEST PA LAT   Narrative INDICATION:  cough     Exam: Chest 2 views. Comparison: None. Findings: Cardiomediastinal silhouette is normal. Pulmonary vasculature is not  engorged. No focal parenchymal opacities, effusions, or pneumothorax. Bony  thorax is intact. Impression Impression:  1. No acute cardiopulmonary disease      No results found.            MEDICATIONS       ALL MEDICATIONS  Current Facility-Administered Medications   Medication Dose Route Frequency    haloperidol decanoate (HALDOL DECANOATE) 50 mg/mL injection 50 mg  50 mg IntraMUSCular Q28D    benzonatate (TESSALON) capsule 100 mg  100 mg Oral TID PRN    divalproex DR (DEPAKOTE) tablet 250 mg  250 mg Oral QHS    ibuprofen (MOTRIN) tablet 800 mg  800 mg Oral Q8H PRN    levothyroxine (SYNTHROID) tablet 25 mcg  25 mcg Oral ACB    chlorproMAZINE (THORAZINE) tablet 200 mg  200 mg Oral 7am    chlorproMAZINE (THORAZINE) tablet 400 mg  400 mg Oral QPM    albuterol (PROVENTIL VENTOLIN) nebulizer solution 2.5 mg  2.5 mg Nebulization Q4H PRN    OLANZapine (ZyPREXA) tablet 2.5 mg  2.5 mg Oral Q6H PRN    ziprasidone (GEODON) 10 mg in sterile water (preservative free) 0.5 mL injection  10 mg IntraMUSCular BID PRN    benztropine (COGENTIN) tablet 1 mg  1 mg Oral BID PRN    benztropine (COGENTIN) injection 1 mg  1 mg IntraMUSCular BID PRN    zolpidem (AMBIEN) tablet 5 mg  5 mg Oral QHS PRN    acetaminophen (TYLENOL) tablet 650 mg  650 mg Oral Q4H PRN    magnesium hydroxide (MILK OF MAGNESIA) 400 mg/5 mL oral suspension 30 mL  30 mL Oral DAILY PRN    nicotine (NICODERM CQ) 21 mg/24 hr patch 1 Patch  1 Patch TransDERmal DAILY PRN    senna-docusate (PERICOLACE) 8.6-50 mg per tablet 1 Tab  1 Tab Oral QHS    simvastatin (ZOCOR) tablet 20 mg  20 mg Oral QHS      SCHEDULED MEDICATIONS  Current Facility-Administered Medications   Medication Dose Route Frequency    haloperidol decanoate (HALDOL DECANOATE) 50 mg/mL injection 50 mg  50 mg IntraMUSCular Q28D    divalproex DR (DEPAKOTE) tablet 250 mg  250 mg Oral QHS    levothyroxine (SYNTHROID) tablet 25 mcg  25 mcg Oral ACB    chlorproMAZINE (THORAZINE) tablet 200 mg  200 mg Oral 7am    chlorproMAZINE (THORAZINE) tablet 400 mg  400 mg Oral QPM    senna-docusate (PERICOLACE) 8.6-50 mg per tablet 1 Tab  1 Tab Oral QHS    simvastatin (ZOCOR) tablet 20 mg  20 mg Oral QHS                ASSESSMENT & PLAN        The patient, Dom Arrington, is a 75 y.o.  female who presents at this time for treatment of the following diagnoses:  Patient Active Hospital Problem List:   Bipolar disorder ([address] (5/17/2018) Assessment: dario alternating with depression     Plan: mood stabilizer/ antipsychotic   5/19/18 continue same medications    5/20/18 continue same medication and check Depakote level tomorrow            I will continue to monitor blood levels (Depakote, Tegretol, lithium, clozapine---a drug with a narrow therapeutic index= NTI) and associated labs for drug therapy implemented that require intense monitoring for toxicity as deemed appropriate based on current medication side effects and pharmacodynamically determined drug 1/2 lives. A coordinated, multidisplinary treatment team (includes the nurse, unit pharmcist,  and writer) round was conducted for this initial evaluation with the patient present. The following regarding medications was addressed during rounds with patient: depakote   the risks and benefits of the proposed medication. The patient was given the opportunity to ask questions. Informed consent given to the use of the above medications. I will continue to adjust psychiatric and non-psychiatric medications (see above \"medication\" section and orders section for details) as deemed appropriate & based upon diagnoses and response to treatment. I have reviewed admission (and previous/old) labs and medical tests in the EHR and or transferring hospital documents. I will continue to order blood tests/labs and diagnostic tests as deemed appropriate and review results as they become available (see orders for details). I have reviewed old psychiatric and medical records available in the EHR. I Will order additional psychiatric records from other institutions to further elucidate the nature of patient's psychopathology and review once available. I will gather additional collateral information from friends, family and o/p treatment team to further elucidate the nature of patient's psychopathology and baselline level of psychiatric functioning.       ESTIMATED LENGTH OF STAY: 3-5 days        STRENGTHS:  Access to housing/residential stability, Knowledge of medications and Motivated and ready for change                                        SIGNED:    Michelle Antony MD  5/23/2018

## 2018-05-24 PROCEDURE — 74011250637 HC RX REV CODE- 250/637: Performed by: NURSE PRACTITIONER

## 2018-05-24 PROCEDURE — 74011250637 HC RX REV CODE- 250/637: Performed by: PSYCHIATRY & NEUROLOGY

## 2018-05-24 PROCEDURE — 65220000003 HC RM SEMIPRIVATE PSYCH

## 2018-05-24 RX ADMIN — CHLORPROMAZINE HYDROCHLORIDE 200 MG: 50 TABLET, SUGAR COATED ORAL at 06:05

## 2018-05-24 RX ADMIN — ZOLPIDEM TARTRATE 5 MG: 5 TABLET ORAL at 20:56

## 2018-05-24 RX ADMIN — BENZONATATE 100 MG: 100 CAPSULE ORAL at 05:40

## 2018-05-24 RX ADMIN — IBUPROFEN 800 MG: 400 TABLET ORAL at 09:20

## 2018-05-24 RX ADMIN — LEVOTHYROXINE SODIUM 25 MCG: 25 TABLET ORAL at 06:30

## 2018-05-24 RX ADMIN — STANDARDIZED SENNA CONCENTRATE AND DOCUSATE SODIUM 1 TABLET: 8.6; 5 TABLET, FILM COATED ORAL at 20:56

## 2018-05-24 RX ADMIN — OLANZAPINE 2.5 MG: 2.5 TABLET, FILM COATED ORAL at 16:13

## 2018-05-24 RX ADMIN — SIMVASTATIN 20 MG: 20 TABLET, FILM COATED ORAL at 20:56

## 2018-05-24 RX ADMIN — DIVALPROEX SODIUM 250 MG: 125 TABLET, DELAYED RELEASE ORAL at 20:55

## 2018-05-24 RX ADMIN — CHLORPROMAZINE HYDROCHLORIDE 400 MG: 50 TABLET, SUGAR COATED ORAL at 17:24

## 2018-05-24 NOTE — BH NOTES
Received pt up in room talking too herself. Very restless and manic like. Apparent hallucinations noted.   Pleasant and cooperative thus far

## 2018-05-24 NOTE — BH NOTES
GROUP THERAPY PROGRESS NOTE    Bienvenido Snyder did not participate in a Process Group on the Geriatric Unit with a focus on shifting ones feelings to a positive note and preparing for the day through group singing.

## 2018-05-24 NOTE — PROGRESS NOTES
Problem: Altered Thought Process (Adult/Pediatric)   Goals will be met by 05/31/18  Goal: *STG: Complies with medication therapy  Outcome: Progressing Towards Goal  Patient complies with medication therapy. Problem: Altered Thought Process (Adult/Pediatric)  Goal: *STG: Decreased hallucinations  Variance: Patient Condition  Comments: Patient is responding to internal stimuli, is having auditory and visual hallucinations.     Problem: Falls - Risk of  Goal: *Absence of Falls  Document Jovana Fall Risk and appropriate interventions in the flowsheet. Outcome: Progressing Towards Goal  Fall Risk Interventions:  Patient is absent of falls.   Non-slip socks, bed alarm and walker in use  Mobility Interventions: Bed/chair exit alarm, Communicate number of staff needed for ambulation/transfer, Utilize walker, cane, or other assitive device     Mentation Interventions: Adequate sleep, hydration, pain control, Door open when patient unattended, Reorient patient     Medication Interventions: Bed/chair exit alarm, Teach patient to arise slowly    [address] for tamradolyn Breath    Date Treatment Plan Initiated: 05/25/18    Treatment Plan Modalities:  Type of Modality Amount  (x minutes) Frequency (x/week) Duration (x days) Name of Responsible Staff   [address] [facility] meetings to encourage peer interactions 15 7 1 AAYUSH Louis     Group psychotherapy to assist in building coping skills and internal controls 60 7 1 Nicolás Castillo   Therapeutic activity groups to build coping skills 60 7 1 Nicolás Castillo   Psychoeducation in group setting to address:   Medication education   15 7 1 Lulu Bowling RN   Coping skills         Relaxation techniques         Symptom management         Discharge planning   60 2 [address]   60 1 1 volunteer   Recovery/AA/NA   61 3 1 volunteer   Physician medication management   13 7 1 Dr. Garfield Schroeder meeting/discharge planning   15 2 1 Dennise Ames and Jaswant Camacho

## 2018-05-24 NOTE — PROGRESS NOTES
Problem: Falls - Risk of  Goal: *Absence of Falls  Document Jovana Fall Risk and appropriate interventions in the flowsheet. Outcome: Progressing Towards Goal  Fall Risk Interventions:  Mobility Interventions: Bed/chair exit alarm, Communicate number of staff needed for ambulation/transfer, Utilize walker, cane, or other assitive device    Mentation Interventions: Adequate sleep, hydration, pain control, Door open when patient unattended, More frequent rounding    Medication Interventions: Bed/chair exit alarm      Resting in bed with eyes closed, no complaints, no distress noted. Safety measures in place, will continue to monitor.              PRN Medication Documentation    Specific patient behavior that led to need for PRN medication: cough  Staff interventions attempted prior to PRN being given: water  PRN medication given: Benzonatate  Patient response/effectiveness of PRN medication: will continue to monitor

## 2018-05-24 NOTE — PROGRESS NOTES
Laboratory Monitoring for Divalproex/Valproic Acid: This patient is currently prescribed the following medication(s):   Current Facility-Administered Medications   Medication Dose Route Frequency    haloperidol decanoate (HALDOL DECANOATE) 50 mg/mL injection 50 mg  50 mg IntraMUSCular Q28D    divalproex DR (DEPAKOTE) tablet 250 mg  250 mg Oral QHS    levothyroxine (SYNTHROID) tablet 25 mcg  25 mcg Oral ACB    chlorproMAZINE (THORAZINE) tablet 200 mg  200 mg Oral 7am    chlorproMAZINE (THORAZINE) tablet 400 mg  400 mg Oral QPM    senna-docusate (PERICOLACE) 8.6-50 mg per tablet 1 Tab  1 Tab Oral QHS    simvastatin (ZOCOR) tablet 20 mg  20 mg Oral QHS     The following labs have been completed for monitoring of valproic acid:    Valproic Acid Serum Concentration  Lab Results   Component Value Date/Time    Valproic acid 44 (L) 05/22/2018 05:46 AM       Hepatic Function  Lab Results   Component Value Date/Time    Bilirubin, total 0.2 05/18/2018 06:12 AM    Protein, total 6.0 (L) 05/18/2018 06:12 AM    Albumin 3.0 (L) 05/18/2018 06:12 AM    Globulin 3.0 05/18/2018 06:12 AM    A-G Ratio 1.0 (L) 05/18/2018 06:12 AM    ALT (SGPT) 20 05/18/2018 06:12 AM    Alk. phosphatase 95 05/18/2018 06:12 AM     Hematology  Lab Results   Component Value Date/Time    WBC 3.2 (L) 05/18/2018 06:12 AM    RBC 4.10 05/18/2018 06:12 AM    HGB 12.1 05/18/2018 06:12 AM    HCT 36.3 05/18/2018 06:12 AM    MCV 88.5 05/18/2018 06:12 AM    MCH 29.5 05/18/2018 06:12 AM    MCHC 33.3 05/18/2018 06:12 AM    RDW 13.2 05/18/2018 06:12 AM    PLATELET 550 33/10/8316 06:12 AM     Assessment/Plan:  A valproic acid level was drawn on 5/22 @ 0546 and found to be 44 mcg/mL. This level is reflective of steady state (after at least 4 doses). This level was drawn ~ 9 hours post the evening dose. True trough would be drawn just prior to the next dose or at least 16 hours post-dose. This level may be as much as 25% higher than true trough.  Based on this level would recommend increasing dose to 500mg QHS. LFTs and platelets WNL.      Chandu Solomon, PharmD, BCPP, [facility] Specialist, [facility]

## 2018-05-24 NOTE — BH NOTES
PRN Medication Documentation    Specific patient behavior that led to need for PRN medication: pt hyper verbal thoughts disorganized, looseness of association, animated, becoming more difficult to redirect  Staff interventions attempted prior to PRN being given: education, therapeutic communication, redirection, therapeutic activities, coping skills  PRN medication given: po 2.5 mg Zyprexa   Patient response/effectiveness of PRN medication: pt resting

## 2018-05-24 NOTE — BH NOTES
GROUP THERAPY PROGRESS NOTE    Earnestine Dale is participating in Leisure-Creative Group.      Group time: 15 minutes    Personal goal for participation: decreased anxiety    Goal orientation: relaxation    Group therapy participation: active    Therapeutic interventions reviewed and discussed:     Impression of participation:good

## 2018-05-24 NOTE — BH NOTES
SW Note: The patient was seen to introduce myself to her and to assess her ability to manage group process. She said she was feeling, \"Great. \" She added that she suffered from a bipolar disorder and concurred when it was suggested she might be in a manic state. Her speech was rapid and her thinking circumstantial. \"I have nerves,\" she concluded. Her affect was manic and anxious. Her mood matched her affect. She was alert and generally oriented. Her speech seemed garbled sometimes. She displayed some insight but her judgment may be reduced. This was the first contact I have had with this patient.

## 2018-05-24 NOTE — INTERDISCIPLINARY ROUNDS
Behavioral Health Interdisciplinary Rounds     Patient Name: Genevieve Mendoza  Age: 75 y.o. Room/Bed:  742/01  Primary Diagnosis: [facility]   Admission Status: Involuntary Commitment     Readmission within 30 days: no  Power of  in place: no  Patient requires a blocked bed: yes          Reason for blocked bed: talks to self loudly, sexually preoccupied    VTE Prophylaxis: Not indicated    Mobility needs/Fall risk: yes    Nutritional Plan: no  Consults:          Labs/Testing due today?: no    Sleep hours:  3.15      Participation in Care/Groups:  yes  Medication Compliant?: Yes  PRNS (last 24 hours): Sleep Aid    Restraints (last 24 hours):  no     CIWA (range last 24 hours):     COWS (range last 24 hours):      Alcohol screening (AUDIT) completed -   AUDIT Score: 0     If applicable, date SBIRT discussed in treatment team AND documented:     Tobacco - patient is a smoker: Have You Used Tobacco in the Past 30 Days: No  Illegal Drugs use: Have You Used Any Illegal Substances Over the Past 12 Months: No    24 hour chart check complete: yes     Patient goal(s) for today:   Treatment team focus/goals: Plan to continue to titrate her medications. Progress note - she remains somewhat loose and labile. LOS:  7  Expected LOS: TBD     Financial concerns/prescription coverage:  Medicare   Date of last family contact:  SW spoke to her  yesterday .       Family requesting physician contact today:  no  Discharge plan: she will return home with her niece   Guns in the home:  No       Outpatient provider(s): [facility]     Participating treatment team members: Ava Ames<DRE - Dr. Roberth Courtney

## 2018-05-24 NOTE — PROGRESS NOTES
Problem: Altered Thought Process (Adult/Pediatric)  Goal: *STG: Decreased hallucinations  Outcome: Not Progressing Towards Goal    Patient continues to experience loud, pressured speech, talking to people not present on unit. Preoccupied with sex. Received PRN Zyprexia at 6328 for paranoid & delusional thoughts. 1700 She attempted to eat dinner in DR with peers, however, she was so inappropriate with her paranoid and delusional thinking that she ate her dinner in her bed room. Zyprexia was not effective. Patient ate 100% dinner, fluids and snacks. She was med compliant. Received PRN Ambien to promote sleep.

## 2018-05-24 NOTE — BH NOTES
Psychiatric progress note            IDENTIFICATION:    Patient Name  Lola Gilmore   Date of Birth 1945   [facility] [id number]   Medical Record Number  [id number]      Age  75 y.o. PCP Eloise Schultz MD   Admit date:  5/17/2018    Room Number  742/01  @ [facility]   Date of Service  5/24/2018            HISTORY         REASON FOR HOSPITALIZATION:  CC: \"agitation, delusions and dario\". Pt admitted under a temporary [facility] order (TDO)  For severe psychosis and dario proving to be an imminent danger to self and others and an inability to care for self. HISTORY OF PRESENT ILLNESS:    The patient, Lola Gilmore, is a 75 y.o. BLACK OR  female with a past psychiatric history significant for bipolar d/o , who presents at this time with complaints of (and/or evidence of) the following emotional symptoms: agitation, delusions and psychotic behavior. Additional symptomatology include agitation. The above symptoms have been present for 3 days . These symptoms are of severe severity. These symptoms are constant  in nature. The patient's condition has been precipitated by medication adjustments and psychosocial stressors (family conflicts  ). Patient's condition made worse by treatment noncompliance. UDS: MJ negative; BAL=0.     5/19/18 she is doing better and mood is ann marie and no anger issues and no self harm behavior sne she still has mood instability and gets angry and agitated sometimes  5/20/18 she is hyperverbal and agitated and she gets manic and rambling and she has flight of ideas , compliant with medications and no SI or I    5/21/18- Yesterday had some agitation and poor directability. Mkes obscene comments under her breath. Can also be charming, pleasant, and quite cooperative. 5/22/18- Med and meal compliant. Redirectable. Visible in milieu. 5/23/18-Very p;easant with maggie correa of humor. Social with peers. Has poor insight that she uses profanity and may offend others. But she is not doing this to be provocative. 5/24/18- Pleasant and cooperative. Med meal and group compliant. No side effects     ALLERGIES: No Known Allergies   MEDICATIONS PRIOR TO ADMISSION:   Prescriptions Prior to Admission   Medication Sig    chlorproMAZINE (THORAZINE) 200 mg tablet Take 200 mg by mouth daily. With 400mg QPM    chlorproMAZINE (THORAZINE) 200 mg tablet Take 400 mg by mouth every evening.  haloperidol decanoate (HALDOL DECANOATE) 50 mg/mL injection 37.5 mg by IntraMUSCular route every four (4) weeks. Indications: Schizophrenia    divalproex DR (DEPAKOTE) 250 mg tablet Take 250 mg by mouth nightly.  ibuprofen (MOTRIN) 800 mg tablet Take 800 mg by mouth every eight (8) hours as needed for Pain.  simvastatin (ZOCOR) 20 mg tablet Take 20 mg by mouth nightly.  levothyroxine (SYNTHROID) 25 mcg tablet Take 25 mcg by mouth Daily (before breakfast).  melatonin tab tablet Take 10 mg by mouth nightly.  losartan (COZAAR) 50 mg tablet Take 50 mg by mouth daily.  albuterol (PROVENTIL HFA, VENTOLIN HFA, PROAIR HFA) 90 mcg/actuation inhaler Take 1-2 Puffs by inhalation every four (4) hours as needed for Wheezing or Shortness of Breath.  senna-docusate (PERICOLACE) 8.6-50 mg per tablet Take 2 Tabs by mouth two (2) times a day.  amLODIPine (NORVASC) 10 mg tablet Take 10 mg by mouth daily. PAST MEDICAL HISTORY:   Past Medical History:   Diagnosis Date    Asthma     Bipolar 1 disorder ([facility] [address]    No past surgical history on file. SOCIAL HISTORY:    Social History     Social History    Marital status:      Spouse name: N/A    Number of children: N/A    Years of education: N/A     Occupational History    Not on file.      Social History Main Topics    Smoking status: Never Smoker    Smokeless tobacco: Not on file    Alcohol use No    Drug use: No    Sexual activity: Not Currently     Other Topics Concern    Not on file     Social History Narrative    75year old AA female admitted on TDO for agitation, making threats and having a fixed delusion that she is . She has been making racial remarks and placing herself in danger. Pt has hx of treatment with Depakote, Thorazine and Haldol Dec.      FAMILY HISTORY:    No family history on file. REVIEW OF SYSTEMS:   Psychological ROS: positive for - irritability  Respiratory ROS: no cough, shortness of breath, or wheezing  Cardiovascular ROS: no chest pain or dyspnea on exertion  Pertinent items are noted in the History of Present Illness. All other Systems reviewed and are considered negative. MENTAL STATUS EXAM & VITALS     MENTAL STATUS EXAM (MSE):    MSE FINDINGS ARE WITHIN NORMAL LIMITS (WNL) UNLESS OTHERWISE STATED BELOW. ( ALL OF THE BELOW CATEGORIES OF THE MSE HAVE BEEN REVIEWED (reviewed 5/24/2018) AND UPDATED AS DEEMED APPROPRIATE )  General Presentation age appropriate, cooperative   Orientation oriented to time, place and person   Vital Signs  See below (reviewed 5/24/2018); Vital Signs (BP, Pulse, & Temp) are within normal limits if not listed below.    Gait and Station Stable/steady, no ataxia   Musculoskeletal System No extrapyramidal symptoms (EPS); no abnormal muscular movements or Tardive Dyskinesia (TD); muscle strength and tone are within normal limits   Language No aphasia or dysarthria   Speech:  normal pitch and normal volume   Thought Processes concrete; normal rate of thoughts; poor abstract reasoning/computation   Thought Associations goal directed   Thought Content Did not show delusion today    Suicidal Ideations none   Homicidal Ideations none   Mood:  irritable   Affect:  mood-congruent   Memory recent  fair   Memory remote:  fair   Concentration/Attention:  distractable   Fund of Knowledge average   Insight:  limited   Reliability fair   Judgment:  limited          VITALS:     Patient Vitals for the past 24 hrs:   Temp Pulse Resp BP SpO2   05/24/18 0718 98 °F (36.7 °C) (!) 101 16 126/70 97 %   05/23/18 1627 98.4 °F (36.9 °C) (!) 109 18 127/82 97 %     Wt Readings from Last 3 Encounters:   05/20/18 76.2 kg (168 lb)   01/01/15 83.9 kg (185 lb)     Temp Readings from Last 3 Encounters:   05/24/18 98 °F (36.7 °C)   01/01/15 97.8 °F (36.6 °C)   12/27/13 97 °F (36.1 °C)     BP Readings from Last 3 Encounters:   05/24/18 126/70   01/01/15 103/65   12/27/13 118/83     Pulse Readings from Last 3 Encounters:   05/24/18 (!) 101   01/01/15 87   12/27/13 96            DATA     LABORATORY DATA:  Labs Reviewed   METABOLIC PANEL, COMPREHENSIVE - Abnormal; Notable for the following:        Result Value    BUN/Creatinine ratio 21 (*)     Protein, total 6.0 (*)     Albumin 3.0 (*)     A-G Ratio 1.0 (*)     All other components within normal limits   CBC W/O DIFF - Abnormal; Notable for the following:     WBC 3.2 (*)     All other components within normal limits   VALPROIC ACID - Abnormal; Notable for the following:     Valproic acid 44 (*)     All other components within normal limits   GLUCOSE, FASTING   TSH 3RD GENERATION   LIPID PANEL     Admission on 05/17/2018   Component Date Value Ref Range Status    Sodium 05/18/2018 137  136 - 145 mmol/L Final    Potassium 05/18/2018 3.8  3.5 - 5.1 mmol/L Final    Chloride 05/18/2018 102  97 - 108 mmol/L Final    CO2 05/18/2018 28  21 - 32 mmol/L Final    Anion gap 05/18/2018 7  5 - 15 mmol/L Final    Glucose 05/18/2018 81  65 - 100 mg/dL Final    BUN 05/18/2018 12  6 - 20 MG/DL Final    Creatinine 05/18/2018 0.56  0.55 - 1.02 MG/DL Final    BUN/Creatinine ratio 05/18/2018 21* 12 - 20   Final    GFR est AA 05/18/2018 >60  >60 ml/min/1.73m2 Final    GFR est non-AA 05/18/2018 >60  >60 ml/min/1.73m2 Final    Calcium 05/18/2018 8.6  8.5 - 10.1 MG/DL Final    Bilirubin, total 05/18/2018 0.2  0.2 - 1.0 MG/DL Final    ALT (SGPT) 05/18/2018 20  12 - 78 U/L Final    AST (SGOT) 05/18/2018 17  15 - 37 U/L Final    Alk.  phosphatase 05/18/2018 95  45 - 117 U/L Final    Protein, total 05/18/2018 6.0* 6.4 - 8.2 g/dL Final    Albumin 05/18/2018 3.0* 3.5 - 5.0 g/dL Final    Globulin 05/18/2018 3.0  2.0 - 4.0 g/dL Final    A-G Ratio 05/18/2018 1.0* 1.1 - 2.2   Final    Glucose 05/18/2018 81  65 - 100 MG/DL Final    WBC 05/18/2018 3.2* 3.6 - 11.0 K/uL Final    RBC 05/18/2018 4.10  3.80 - 5.20 M/uL Final    HGB 05/18/2018 12.1  11.5 - 16.0 g/dL Final    HCT 05/18/2018 36.3  35.0 - 47.0 % Final    MCV 05/18/2018 88.5  80.0 - 99.0 FL Final    MCH 05/18/2018 29.5  26.0 - 34.0 PG Final    MCHC 05/18/2018 33.3  30.0 - 36.5 g/dL Final    RDW 05/18/2018 13.2  11.5 - 14.5 % Final    PLATELET 35/31/6762 783  150 - 400 K/uL Final    MPV 05/18/2018 9.0  8.9 - 12.9 FL Final    NRBC 05/18/2018 0.0  0  WBC Final    ABSOLUTE NRBC 05/18/2018 0.00  0.00 - 0.01 K/uL Final    TSH 05/18/2018 1.11  0.36 - 3.74 uIU/mL Final    LIPID PROFILE 05/18/2018        Final    Cholesterol, total 05/18/2018 132  <200 MG/DL Final    Triglyceride 05/18/2018 40  <150 MG/DL Final    HDL Cholesterol 05/18/2018 73  MG/DL Final    LDL, calculated 05/18/2018 51  0 - 100 MG/DL Final    VLDL, calculated 05/18/2018 8  MG/DL Final    CHOL/HDL Ratio 05/18/2018 1.8  0 - 5.0   Final    Valproic acid 05/22/2018 44* 50 - 100 ug/ml Final        RADIOLOGY REPORTS:    Results from Hospital Encounter encounter on 05/17/18   XR CHEST PA LAT   Narrative INDICATION:  cough     Exam: Chest 2 views. Comparison: None. Findings: Cardiomediastinal silhouette is normal. Pulmonary vasculature is not  engorged. No focal parenchymal opacities, effusions, or pneumothorax. Bony  thorax is intact. Impression Impression:  1. No acute cardiopulmonary disease      No results found.            MEDICATIONS       ALL MEDICATIONS  Current Facility-Administered Medications   Medication Dose Route Frequency    haloperidol decanoate (HALDOL DECANOATE) 50 mg/mL injection 50 mg  50 mg IntraMUSCular Q28D    benzonatate (TESSALON) capsule 100 mg  100 mg Oral TID PRN    divalproex DR (DEPAKOTE) tablet 250 mg  250 mg Oral QHS    ibuprofen (MOTRIN) tablet 800 mg  800 mg Oral Q8H PRN    levothyroxine (SYNTHROID) tablet 25 mcg  25 mcg Oral ACB    chlorproMAZINE (THORAZINE) tablet 200 mg  200 mg Oral 7am    chlorproMAZINE (THORAZINE) tablet 400 mg  400 mg Oral QPM    albuterol (PROVENTIL VENTOLIN) nebulizer solution 2.5 mg  2.5 mg Nebulization Q4H PRN    OLANZapine (ZyPREXA) tablet 2.5 mg  2.5 mg Oral Q6H PRN    ziprasidone (GEODON) 10 mg in sterile water (preservative free) 0.5 mL injection  10 mg IntraMUSCular BID PRN    benztropine (COGENTIN) tablet 1 mg  1 mg Oral BID PRN    benztropine (COGENTIN) injection 1 mg  1 mg IntraMUSCular BID PRN    zolpidem (AMBIEN) tablet 5 mg  5 mg Oral QHS PRN    acetaminophen (TYLENOL) tablet 650 mg  650 mg Oral Q4H PRN    magnesium hydroxide (MILK OF MAGNESIA) 400 mg/5 mL oral suspension 30 mL  30 mL Oral DAILY PRN    nicotine (NICODERM CQ) 21 mg/24 hr patch 1 Patch  1 Patch TransDERmal DAILY PRN    senna-docusate (PERICOLACE) 8.6-50 mg per tablet 1 Tab  1 Tab Oral QHS    simvastatin (ZOCOR) tablet 20 mg  20 mg Oral QHS      SCHEDULED MEDICATIONS  Current Facility-Administered Medications   Medication Dose Route Frequency    haloperidol decanoate (HALDOL DECANOATE) 50 mg/mL injection 50 mg  50 mg IntraMUSCular Q28D    divalproex DR (DEPAKOTE) tablet 250 mg  250 mg Oral QHS    levothyroxine (SYNTHROID) tablet 25 mcg  25 mcg Oral ACB    chlorproMAZINE (THORAZINE) tablet 200 mg  200 mg Oral 7am    chlorproMAZINE (THORAZINE) tablet 400 mg  400 mg Oral QPM    senna-docusate (PERICOLACE) 8.6-50 mg per tablet 1 Tab  1 Tab Oral QHS    simvastatin (ZOCOR) tablet 20 mg  20 mg Oral QHS                ASSESSMENT & PLAN        The patient, Cherry Vargas, is a 75 y.o.  female who presents at this time for treatment of the following diagnoses:  Patient Active Hospital Problem List:   Bipolar disorder ([facility] (5/17/2018)    Assessment: dario alternating with depression     Plan: mood stabilizer/ antipsychotic   5/19/18 continue same medications    5/20/18 continue same medication and check Depakote level tomorrow            I will continue to monitor blood levels (Depakote, Tegretol, lithium, clozapine---a drug with a narrow therapeutic index= NTI) and associated labs for drug therapy implemented that require intense monitoring for toxicity as deemed appropriate based on current medication side effects and pharmacodynamically determined drug 1/2 lives. A coordinated, multidisplinary treatment team (includes the nurse, unit pharmcist,  and writer) round was conducted for this initial evaluation with the patient present. The following regarding medications was addressed during rounds with patient: depakote   the risks and benefits of the proposed medication. The patient was given the opportunity to ask questions. Informed consent given to the use of the above medications. I will continue to adjust psychiatric and non-psychiatric medications (see above \"medication\" section and orders section for details) as deemed appropriate & based upon diagnoses and response to treatment. I have reviewed admission (and previous/old) labs and medical tests in the EHR and or transferring hospital documents. I will continue to order blood tests/labs and diagnostic tests as deemed appropriate and review results as they become available (see orders for details). I have reviewed old psychiatric and medical records available in the EHR. I Will order additional psychiatric records from other institutions to further elucidate the nature of patient's psychopathology and review once available.     I will gather additional collateral information from friends, family and o/p treatment team to further elucidate the nature of patient's psychopathology and baselline level of psychiatric functioning.       ESTIMATED LENGTH OF STAY:    3-5 days        STRENGTHS:  Access to housing/residential stability, Knowledge of medications and Motivated and ready for change                                        SIGNED:    Camille Canas MD  5/24/2018

## 2018-05-25 PROCEDURE — 74011250637 HC RX REV CODE- 250/637: Performed by: PSYCHIATRY & NEUROLOGY

## 2018-05-25 PROCEDURE — 74011250637 HC RX REV CODE- 250/637: Performed by: NURSE PRACTITIONER

## 2018-05-25 PROCEDURE — 65220000003 HC RM SEMIPRIVATE PSYCH

## 2018-05-25 RX ORDER — DIVALPROEX SODIUM 500 MG/1
500 TABLET, EXTENDED RELEASE ORAL
Status: DISCONTINUED | OUTPATIENT
Start: 2018-05-25 | End: 2018-05-28

## 2018-05-25 RX ORDER — DIVALPROEX SODIUM 500 MG/1
500 TABLET, DELAYED RELEASE ORAL
Status: DISCONTINUED | OUTPATIENT
Start: 2018-05-25 | End: 2018-05-25 | Stop reason: DRUGHIGH

## 2018-05-25 RX ADMIN — STANDARDIZED SENNA CONCENTRATE AND DOCUSATE SODIUM 1 TABLET: 8.6; 5 TABLET, FILM COATED ORAL at 20:59

## 2018-05-25 RX ADMIN — BENZONATATE 100 MG: 100 CAPSULE ORAL at 14:35

## 2018-05-25 RX ADMIN — CHLORPROMAZINE HYDROCHLORIDE 200 MG: 50 TABLET, SUGAR COATED ORAL at 09:30

## 2018-05-25 RX ADMIN — IBUPROFEN 800 MG: 400 TABLET ORAL at 02:51

## 2018-05-25 RX ADMIN — LEVOTHYROXINE SODIUM 25 MCG: 25 TABLET ORAL at 09:31

## 2018-05-25 RX ADMIN — OLANZAPINE 2.5 MG: 2.5 TABLET, FILM COATED ORAL at 14:37

## 2018-05-25 RX ADMIN — IBUPROFEN 800 MG: 400 TABLET ORAL at 14:30

## 2018-05-25 RX ADMIN — CHLORPROMAZINE HYDROCHLORIDE 400 MG: 50 TABLET, SUGAR COATED ORAL at 17:44

## 2018-05-25 RX ADMIN — SIMVASTATIN 20 MG: 20 TABLET, FILM COATED ORAL at 08:00

## 2018-05-25 RX ADMIN — DIVALPROEX SODIUM 500 MG: 500 TABLET, EXTENDED RELEASE ORAL at 20:59

## 2018-05-25 NOTE — BH NOTES
Psychiatric progress note            IDENTIFICATION:    Patient Name  Ary Dempsey   Date of Birth 1945   [facility] [id number]   Medical Record Number  [id number]      Age  75 y.o. PCP Andrés Reyes MD   Admit date:  5/17/2018    Room Number  742/01  @ [facility]   Date of Service  5/25/2018            HISTORY         REASON FOR HOSPITALIZATION:  CC: \"agitation, delusions and dario\". Pt admitted under a temporary skilled nursing order (TDO)  For severe psychosis and dario proving to be an imminent danger to self and others and an inability to care for self. HISTORY OF PRESENT ILLNESS:    The patient, Ary Dempsey, is a 75 y.o. BLACK OR  female with a past psychiatric history significant for bipolar d/o , who presents at this time with complaints of (and/or evidence of) the following emotional symptoms: agitation, delusions and psychotic behavior. Additional symptomatology include agitation. The above symptoms have been present for 3 days . These symptoms are of severe severity. These symptoms are constant  in nature. The patient's condition has been precipitated by medication adjustments and psychosocial stressors (family conflicts  ). Patient's condition made worse by treatment noncompliance. UDS: MJ negative; BAL=0.     5/19/18 she is doing better and mood is ann marie and no anger issues and no self harm behavior sne she still has mood instability and gets angry and agitated sometimes  5/20/18 she is hyperverbal and agitated and she gets manic and rambling and she has flight of ideas , compliant with medications and no SI or I    5/21/18- Yesterday had some agitation and poor directability. Mkes obscene comments under her breath. Can also be charming, pleasant, and quite cooperative. 5/22/18- Med and meal compliant. Redirectable. Visible in milieu. 5/23/18-Very p;easant with maggie correa of humor. Social with peers. Has poor insight that she uses profanity and may offend others. But she is not doing this to be provocative. 5/24/18- Pleasant and cooperative. Med meal and group compliant. No side effects  5/25/18- Well mannered and very pleasant. Occasionally pressured and loud non offensive speech. Mood is good and is redirectable. ALLERGIES: No Known Allergies   MEDICATIONS PRIOR TO ADMISSION:   Prescriptions Prior to Admission   Medication Sig    chlorproMAZINE (THORAZINE) 200 mg tablet Take 200 mg by mouth daily. With 400mg QPM    chlorproMAZINE (THORAZINE) 200 mg tablet Take 400 mg by mouth every evening.  haloperidol decanoate (HALDOL DECANOATE) 50 mg/mL injection 37.5 mg by IntraMUSCular route every four (4) weeks. Indications: Schizophrenia    divalproex DR (DEPAKOTE) 250 mg tablet Take 250 mg by mouth nightly.  ibuprofen (MOTRIN) 800 mg tablet Take 800 mg by mouth every eight (8) hours as needed for Pain.  simvastatin (ZOCOR) 20 mg tablet Take 20 mg by mouth nightly.  levothyroxine (SYNTHROID) 25 mcg tablet Take 25 mcg by mouth Daily (before breakfast).  melatonin tab tablet Take 10 mg by mouth nightly.  losartan (COZAAR) 50 mg tablet Take 50 mg by mouth daily.  albuterol (PROVENTIL HFA, VENTOLIN HFA, PROAIR HFA) 90 mcg/actuation inhaler Take 1-2 Puffs by inhalation every four (4) hours as needed for Wheezing or Shortness of Breath.  senna-docusate (PERICOLACE) 8.6-50 mg per tablet Take 2 Tabs by mouth two (2) times a day.  amLODIPine (NORVASC) 10 mg tablet Take 10 mg by mouth daily. PAST MEDICAL HISTORY:   Past Medical History:   Diagnosis Date    Asthma     Bipolar 1 disorder ([facility] [address]    No past surgical history on file. SOCIAL HISTORY:    Social History     Social History    Marital status:      Spouse name: N/A    Number of children: N/A    Years of education: N/A     Occupational History    Not on file.      Social History Main Topics    Smoking status: Never Smoker    Smokeless tobacco: Not on file    Alcohol use No    Drug use: No    Sexual activity: Not Currently     Other Topics Concern    Not on file     Social History Narrative    75year old AA female admitted on TDO for agitation, making threats and having a fixed delusion that she is . She has been making racial remarks and placing herself in danger. Pt has hx of treatment with Depakote, Thorazine and Haldol Dec.      FAMILY HISTORY:    No family history on file. REVIEW OF SYSTEMS:   Psychological ROS: positive for - irritability  Respiratory ROS: no cough, shortness of breath, or wheezing  Cardiovascular ROS: no chest pain or dyspnea on exertion  Pertinent items are noted in the History of Present Illness. All other Systems reviewed and are considered negative. MENTAL STATUS EXAM & VITALS     MENTAL STATUS EXAM (MSE):    MSE FINDINGS ARE WITHIN NORMAL LIMITS (WNL) UNLESS OTHERWISE STATED BELOW. ( ALL OF THE BELOW CATEGORIES OF THE MSE HAVE BEEN REVIEWED (reviewed 5/25/2018) AND UPDATED AS DEEMED APPROPRIATE )  General Presentation age appropriate, cooperative   Orientation oriented to time, place and person   Vital Signs  See below (reviewed 5/25/2018); Vital Signs (BP, Pulse, & Temp) are within normal limits if not listed below.    Gait and Station Stable/steady, no ataxia   Musculoskeletal System No extrapyramidal symptoms (EPS); no abnormal muscular movements or Tardive Dyskinesia (TD); muscle strength and tone are within normal limits   Language No aphasia or dysarthria   Speech:  normal pitch and normal volume   Thought Processes concrete; normal rate of thoughts; poor abstract reasoning/computation   Thought Associations goal directed   Thought Content Did not show delusion today    Suicidal Ideations none   Homicidal Ideations none   Mood:  irritable   Affect:  mood-congruent   Memory recent  fair   Memory remote:  fair   Concentration/Attention:  distractable   Fund of Knowledge average   Insight:  limited   Reliability fair   Judgment:  limited VITALS:     Patient Vitals for the past 24 hrs:   Temp Pulse Resp BP SpO2   05/25/18 0801 97.9 °F (36.6 °C) 93 18 131/88 -   05/24/18 1958 97.7 °F (36.5 °C) 100 16 116/68 97 %   05/24/18 1612 98.3 °F (36.8 °C) 98 20 132/82 96 %     Wt Readings from Last 3 Encounters:   05/20/18 76.2 kg (168 lb)   01/01/15 83.9 kg (185 lb)     Temp Readings from Last 3 Encounters:   05/25/18 97.9 °F (36.6 °C)   01/01/15 97.8 °F (36.6 °C)   12/27/13 97 °F (36.1 °C)     BP Readings from Last 3 Encounters:   05/25/18 131/88   01/01/15 103/65   12/27/13 118/83     Pulse Readings from Last 3 Encounters:   05/25/18 93   01/01/15 87   12/27/13 96            DATA     LABORATORY DATA:  Labs Reviewed   METABOLIC PANEL, COMPREHENSIVE - Abnormal; Notable for the following:        Result Value    BUN/Creatinine ratio 21 (*)     Protein, total 6.0 (*)     Albumin 3.0 (*)     A-G Ratio 1.0 (*)     All other components within normal limits   CBC W/O DIFF - Abnormal; Notable for the following:     WBC 3.2 (*)     All other components within normal limits   VALPROIC ACID - Abnormal; Notable for the following:     Valproic acid 44 (*)     All other components within normal limits   GLUCOSE, FASTING   TSH 3RD GENERATION   LIPID PANEL     Admission on 05/17/2018   Component Date Value Ref Range Status    Sodium 05/18/2018 137  136 - 145 mmol/L Final    Potassium 05/18/2018 3.8  3.5 - 5.1 mmol/L Final    Chloride 05/18/2018 102  97 - 108 mmol/L Final    CO2 05/18/2018 28  21 - 32 mmol/L Final    Anion gap 05/18/2018 7  5 - 15 mmol/L Final    Glucose 05/18/2018 81  65 - 100 mg/dL Final    BUN 05/18/2018 12  6 - 20 MG/DL Final    Creatinine 05/18/2018 0.56  0.55 - 1.02 MG/DL Final    BUN/Creatinine ratio 05/18/2018 21* 12 - 20   Final    GFR est AA 05/18/2018 >60  >60 ml/min/1.73m2 Final    GFR est non-AA 05/18/2018 >60  >60 ml/min/1.73m2 Final    Calcium 05/18/2018 8.6  8.5 - 10.1 MG/DL Final    Bilirubin, total 05/18/2018 0.2  0.2 - 1.0 MG/DL Final    ALT (SGPT) 05/18/2018 20  12 - 78 U/L Final    AST (SGOT) 05/18/2018 17  15 - 37 U/L Final    Alk. phosphatase 05/18/2018 95  45 - 117 U/L Final    Protein, total 05/18/2018 6.0* 6.4 - 8.2 g/dL Final    Albumin 05/18/2018 3.0* 3.5 - 5.0 g/dL Final    Globulin 05/18/2018 3.0  2.0 - 4.0 g/dL Final    A-G Ratio 05/18/2018 1.0* 1.1 - 2.2   Final    Glucose 05/18/2018 81  65 - 100 MG/DL Final    WBC 05/18/2018 3.2* 3.6 - 11.0 K/uL Final    RBC 05/18/2018 4.10  3.80 - 5.20 M/uL Final    HGB 05/18/2018 12.1  11.5 - 16.0 g/dL Final    HCT 05/18/2018 36.3  35.0 - 47.0 % Final    MCV 05/18/2018 88.5  80.0 - 99.0 FL Final    MCH 05/18/2018 29.5  26.0 - 34.0 PG Final    MCHC 05/18/2018 33.3  30.0 - 36.5 g/dL Final    RDW 05/18/2018 13.2  11.5 - 14.5 % Final    PLATELET 58/08/1576 407  150 - 400 K/uL Final    MPV 05/18/2018 9.0  8.9 - 12.9 FL Final    NRBC 05/18/2018 0.0  0  WBC Final    ABSOLUTE NRBC 05/18/2018 0.00  0.00 - 0.01 K/uL Final    TSH 05/18/2018 1.11  0.36 - 3.74 uIU/mL Final    LIPID PROFILE 05/18/2018        Final    Cholesterol, total 05/18/2018 132  <200 MG/DL Final    Triglyceride 05/18/2018 40  <150 MG/DL Final    HDL Cholesterol 05/18/2018 73  MG/DL Final    LDL, calculated 05/18/2018 51  0 - 100 MG/DL Final    VLDL, calculated 05/18/2018 8  MG/DL Final    CHOL/HDL Ratio 05/18/2018 1.8  0 - 5.0   Final    Valproic acid 05/22/2018 44* 50 - 100 ug/ml Final        RADIOLOGY REPORTS:    Results from Hospital Encounter encounter on 05/17/18   XR CHEST PA LAT   Narrative INDICATION:  cough     Exam: Chest 2 views. Comparison: None. Findings: Cardiomediastinal silhouette is normal. Pulmonary vasculature is not  engorged. No focal parenchymal opacities, effusions, or pneumothorax. Bony  thorax is intact. Impression Impression:  1. No acute cardiopulmonary disease      No results found.            MEDICATIONS       ALL MEDICATIONS  Current Facility-Administered Medications   Medication Dose Route Frequency    divalproex DR (DEPAKOTE) tablet 500 mg  500 mg Oral QHS    haloperidol decanoate (HALDOL DECANOATE) 50 mg/mL injection 50 mg  50 mg IntraMUSCular Q28D    benzonatate (TESSALON) capsule 100 mg  100 mg Oral TID PRN    ibuprofen (MOTRIN) tablet 800 mg  800 mg Oral Q8H PRN    levothyroxine (SYNTHROID) tablet 25 mcg  25 mcg Oral ACB    chlorproMAZINE (THORAZINE) tablet 200 mg  200 mg Oral 7am    chlorproMAZINE (THORAZINE) tablet 400 mg  400 mg Oral QPM    albuterol (PROVENTIL VENTOLIN) nebulizer solution 2.5 mg  2.5 mg Nebulization Q4H PRN    OLANZapine (ZyPREXA) tablet 2.5 mg  2.5 mg Oral Q6H PRN    ziprasidone (GEODON) 10 mg in sterile water (preservative free) 0.5 mL injection  10 mg IntraMUSCular BID PRN    benztropine (COGENTIN) tablet 1 mg  1 mg Oral BID PRN    benztropine (COGENTIN) injection 1 mg  1 mg IntraMUSCular BID PRN    zolpidem (AMBIEN) tablet 5 mg  5 mg Oral QHS PRN    acetaminophen (TYLENOL) tablet 650 mg  650 mg Oral Q4H PRN    magnesium hydroxide (MILK OF MAGNESIA) 400 mg/5 mL oral suspension 30 mL  30 mL Oral DAILY PRN    nicotine (NICODERM CQ) 21 mg/24 hr patch 1 Patch  1 Patch TransDERmal DAILY PRN    senna-docusate (PERICOLACE) 8.6-50 mg per tablet 1 Tab  1 Tab Oral QHS    simvastatin (ZOCOR) tablet 20 mg  20 mg Oral QHS      SCHEDULED MEDICATIONS  Current Facility-Administered Medications   Medication Dose Route Frequency    divalproex DR (DEPAKOTE) tablet 500 mg  500 mg Oral QHS    haloperidol decanoate (HALDOL DECANOATE) 50 mg/mL injection 50 mg  50 mg IntraMUSCular Q28D    levothyroxine (SYNTHROID) tablet 25 mcg  25 mcg Oral ACB    chlorproMAZINE (THORAZINE) tablet 200 mg  200 mg Oral 7am    chlorproMAZINE (THORAZINE) tablet 400 mg  400 mg Oral QPM    senna-docusate (PERICOLACE) 8.6-50 mg per tablet 1 Tab  1 Tab Oral QHS    simvastatin (ZOCOR) tablet 20 mg  20 mg Oral QHS                ASSESSMENT & PLAN        The patient, Chin Molina, is a 75 y.o.  female who presents at this time for treatment of the following diagnoses:  Patient Active Hospital Problem List:   Bipolar disorder ([facility] [address] (5/17/2018)    Assessment: dario alternating with depression     Plan: mood stabilizer/ antipsychotic   5/19/18 continue same medications    5/20/18 continue same medication and check Depakote level tomorrow            I will continue to monitor blood levels (Depakote, Tegretol, lithium, clozapine---a drug with a narrow therapeutic index= NTI) and associated labs for drug therapy implemented that require intense monitoring for toxicity as deemed appropriate based on current medication side effects and pharmacodynamically determined drug 1/2 lives. A coordinated, multidisplinary treatment team (includes the nurse, unit pharmcist,  and writer) round was conducted for this initial evaluation with the patient present. The following regarding medications was addressed during rounds with patient: depakote   the risks and benefits of the proposed medication. The patient was given the opportunity to ask questions. Informed consent given to the use of the above medications. I will continue to adjust psychiatric and non-psychiatric medications (see above \"medication\" section and orders section for details) as deemed appropriate & based upon diagnoses and response to treatment. I have reviewed admission (and previous/old) labs and medical tests in the EHR and or transferring hospital documents. I will continue to order blood tests/labs and diagnostic tests as deemed appropriate and review results as they become available (see orders for details). I have reviewed old psychiatric and medical records available in the EHR. I Will order additional psychiatric records from other institutions to further elucidate the nature of patient's psychopathology and review once available.     I will gather additional collateral information from friends, family and o/p treatment team to further elucidate the nature of patient's psychopathology and baselline level of psychiatric functioning.       ESTIMATED LENGTH OF STAY:    3-5 days        STRENGTHS:  Access to housing/residential stability, Knowledge of medications and Motivated and ready for change                                        SIGNED:    Real Pollack MD  5/25/2018

## 2018-05-25 NOTE — INTERDISCIPLINARY ROUNDS
Behavioral Health Interdisciplinary Rounds     Patient Name: Fabian Rodas  Age: 75 y.o. Room/Bed:  742/01  Primary Diagnosis: Bipolar disorder ([facility] [address]   Admission Status: Involuntary Commitment     Readmission within 30 days: no  Power of  in place: no  Patient requires a blocked bed: yes          Reason for blocked bed: talks to self loud and sex preoccupied    VTE Prophylaxis: Not indicated    Mobility needs/Fall risk: yes    Nutritional Plan: no  Consults:          Labs/Testing due today?: no    Sleep hours:        Participation in Care/Groups:  yes  Medication Compliant?: Yes  PRNS (last 24 hours): sleep    Restraints (last 24 hours):  no     CIWA (range last 24 hours):     COWS (range last 24 hours):      Alcohol screening (AUDIT) completed -   AUDIT Score: 0     If applicable, date SBIRT discussed in treatment team AND documented:     Tobacco - patient is a smoker: Have You Used Tobacco in the Past 30 Days: No  Illegal Drugs use: Have You Used Any Illegal Substances Over the Past 12 Months: No    24 hour chart check complete: yes     Patient goal(s) for today:   Treatment team focus/goals:Plan to continue to titrate her medications. Progress note - she remains manic at times and required prn medications on evenings yesterday.       LOS:  8  Expected LOS: TBD     Financial concerns/prescription coverage:medicare     Date of last family contact:  DRE spoke to her  yesterday and will update them again today     Family requesting physician contact today:    Discharge plan:she will return home with her niece and [facility]    Guns in the home:  no       Outpatient provider(s): [place] [facility] team     Participating treatment team members: DRE Man -  Dr. Abelardo Jose Rn

## 2018-05-25 NOTE — PROGRESS NOTES
Problem: Altered Thought Process (Adult/Pediatric)  Goal: *STG: Decreased delusional thinking  Outcome: Not Progressing Towards Goal  Continues to talk to people not present on unit    Isolative to bedroom    Remains Paranoid & delusional    Ate 100% dinner, fluids and snacks    Med compliant

## 2018-05-25 NOTE — BH NOTES
PRN Medication Documentation    Specific patient behavior that led to need for PRN medication: auditory and visual hallucinations  Staff interventions attempted prior to PRN being given: rest, distraction  PRN medication given: 2.5 mg Zyprexa PO  Patient response/effectiveness of PRN medication:

## 2018-05-25 NOTE — PROGRESS NOTES
Problem: Altered Thought Process (Adult/Pediatric)  Goal: *STG: Decreased hallucinations  Variance: Patient Condition  Comments: Patient is responding to internal stimuli, is having auditory and visual hallucinations. Patient is smiling, cooperative, excessively loud, med and meal compliant. Problem: Falls - Risk of  Goal: *Absence of Falls  Document Jovana Fall Risk and appropriate interventions in the flowsheet. Outcome: Progressing Towards Goal  Fall Risk Interventions:  Patient is absent of falls.   Mobility Interventions: Bed/chair exit alarm, Communicate number of staff needed for ambulation/transfer, Utilize walker, cane, or other assitive device    Mentation Interventions: Adequate sleep, hydration, pain control, Door open when patient unattended, Reorient patient    Medication Interventions: Bed/chair exit alarm, Teach patient to arise slowly

## 2018-05-25 NOTE — BH NOTES
PRN Medication Documentation    Specific patient behavior that led to need for PRN medication: Complained about having headache and wanted Motrin   Staff interventions attempted prior to PRN being given: Assessed ment done for level of pain and she did not give a number as she pointed to area and mumbled some incoherent explanation.   PRN medication given:Given Motrin 800 mg,po prn at 0251  Patient response/effectiveness of PRN medication: Quiet and laid in bed for about 30 minutes

## 2018-05-25 NOTE — PROGRESS NOTES
Problem: Falls - Risk of  Goal: *Absence of Falls  Document Jovana Fall Risk and appropriate interventions in the flowsheet. Outcome: Progressing Towards Goal  Fall Risk Interventions:  Mobility Interventions: Bed/chair exit alarm, Communicate number of staff needed for ambulation/transfer, Utilize walker, cane, or other assitive device  In bed asleep at this writing with respirations noted as even and unlabored as chest was rising and falling. Will continue to monitor throughout shift for safety and 15 minute checks.   Mentation Interventions: Adequate sleep, hydration, pain control, Door open when patient unattended, Reorient patient    Medication Interventions: Bed/chair exit alarm, Teach patient to arise slowly

## 2018-05-26 PROCEDURE — 74011250637 HC RX REV CODE- 250/637: Performed by: PSYCHIATRY & NEUROLOGY

## 2018-05-26 PROCEDURE — 65220000003 HC RM SEMIPRIVATE PSYCH

## 2018-05-26 PROCEDURE — 74011250637 HC RX REV CODE- 250/637: Performed by: NURSE PRACTITIONER

## 2018-05-26 RX ADMIN — DIVALPROEX SODIUM 500 MG: 500 TABLET, EXTENDED RELEASE ORAL at 20:21

## 2018-05-26 RX ADMIN — CHLORPROMAZINE HYDROCHLORIDE 400 MG: 50 TABLET, SUGAR COATED ORAL at 17:47

## 2018-05-26 RX ADMIN — BENZONATATE 100 MG: 100 CAPSULE ORAL at 13:40

## 2018-05-26 RX ADMIN — CHLORPROMAZINE HYDROCHLORIDE 200 MG: 50 TABLET, SUGAR COATED ORAL at 07:47

## 2018-05-26 RX ADMIN — STANDARDIZED SENNA CONCENTRATE AND DOCUSATE SODIUM 1 TABLET: 8.6; 5 TABLET, FILM COATED ORAL at 20:20

## 2018-05-26 RX ADMIN — ZOLPIDEM TARTRATE 5 MG: 5 TABLET ORAL at 22:31

## 2018-05-26 RX ADMIN — LEVOTHYROXINE SODIUM 25 MCG: 25 TABLET ORAL at 07:47

## 2018-05-26 RX ADMIN — SIMVASTATIN 20 MG: 20 TABLET, FILM COATED ORAL at 20:22

## 2018-05-26 NOTE — INTERDISCIPLINARY ROUNDS
Behavioral Health Interdisciplinary Rounds     Patient Name: Junior Jaimes  Age: 75 y.o.   Room/Bed:  2/  [facility]: [facility]   Admission Status: Involuntary Commitment     Readmission within 30 days: no  Power of  in place: no  Patient requires a blocked bed: yes          Reason for blocked bed:     VTE Prophylaxis: Not indicated    Mobility needs/Fall risk: yes    Nutritional Plan: no  Consults:          Labs/Testing due today?: no    Sleep hours: 3.0       Participation in Care/Groups:  yes  Medication Compliant?: Yes  PRNS (last 24 hours): None    Restraints (last 24 hours):  no     CIWA (range last 24 hours):     COWS (range last 24 hours):      Alcohol screening (AUDIT) completed -   AUDIT Score: 0     If applicable, date SBIRT discussed in treatment team AND documented:     Tobacco - patient is a smoker: Have You Used Tobacco in the Past 30 Days: No  Illegal Drugs use: Have You Used Any Illegal Substances Over the Past 12 Months: No    24 hour chart check complete: yes     Patient goal(s) for today:   Treatment team focus/goals:   Progress note     LOS:  9  Expected LOS:     Financial concerns/prescription coverage:    Date of last family contact:      Family requesting physician contact today:    Discharge plan:   Guns in the home:         Outpatient provider(s):     Participating treatment team members: Junior Jaimes, * (assigned SW),

## 2018-05-26 NOTE — PROGRESS NOTES
Problem: Altered Thought Process (Adult/Pediatric)  Goal: *STG: Participates in treatment plan  Outcome: Progressing Towards Goal  Pt up in the dining area talking at the table at the start of shift. Pt was cooperative when asked to go to her room when staff wanted to have the dining area calmer. Pt came back out at dining area for dinner. Pt had a phone call during dinner. Pt took the call into her room. Pt talking excessively loud on call. After call pt came back out to the dining area and sat at the table quietly for 45 minutes and then went back to her room. Pt compliant with meal and meds.

## 2018-05-26 NOTE — PROGRESS NOTES
Problem: Falls - Risk of  Goal: *Absence of Falls  Document Jovana Fall Risk and appropriate interventions in the flowsheet. Outcome: Progressing Towards Goal  Fall Risk Interventions:  Mobility Interventions: Bed/chair exit alarm, Patient to call before getting OOB, Utilize walker, cane, or other assitive device  In bed asleep at start of shift with respirations noted as even and unlabored as chest was rising and falling. Will continue to monitor throughout shift for safety and 15 minute checks.   Mentation Interventions: Adequate sleep, hydration, pain control, More frequent rounding, Reorient patient    Medication Interventions: Teach patient to arise slowly

## 2018-05-26 NOTE — PROGRESS NOTES
Problem: Altered Thought Process (Adult/Pediatric)  Goal: *STG: Participates in treatment plan  Outcome: Not Progressing Towards Goal  Pt is cooperative, isolative to her room  Remains Paranoid & delusional  AH and VH  Eats 100% dinner, fluids and snacks  Med/Meal compliant  No S/I  Will continue to monitor

## 2018-05-27 PROCEDURE — 74011250637 HC RX REV CODE- 250/637: Performed by: NURSE PRACTITIONER

## 2018-05-27 PROCEDURE — 74011250637 HC RX REV CODE- 250/637: Performed by: PSYCHIATRY & NEUROLOGY

## 2018-05-27 PROCEDURE — 65220000003 HC RM SEMIPRIVATE PSYCH

## 2018-05-27 RX ADMIN — ZOLPIDEM TARTRATE 5 MG: 5 TABLET ORAL at 21:11

## 2018-05-27 RX ADMIN — CHLORPROMAZINE HYDROCHLORIDE 200 MG: 50 TABLET, SUGAR COATED ORAL at 06:01

## 2018-05-27 RX ADMIN — CHLORPROMAZINE HYDROCHLORIDE 400 MG: 50 TABLET, SUGAR COATED ORAL at 17:56

## 2018-05-27 RX ADMIN — STANDARDIZED SENNA CONCENTRATE AND DOCUSATE SODIUM 1 TABLET: 8.6; 5 TABLET, FILM COATED ORAL at 21:11

## 2018-05-27 RX ADMIN — DIVALPROEX SODIUM 500 MG: 500 TABLET, EXTENDED RELEASE ORAL at 21:11

## 2018-05-27 RX ADMIN — SIMVASTATIN 20 MG: 20 TABLET, FILM COATED ORAL at 21:11

## 2018-05-27 RX ADMIN — LEVOTHYROXINE SODIUM 25 MCG: 25 TABLET ORAL at 06:01

## 2018-05-27 NOTE — BH NOTES
Psychiatric progress note            IDENTIFICATION:    Patient Name  Earnestine Dale   Date of Birth 1945   [facility] [id number]   Medical Record Number  [id number]      Age  75 y.o. PCP Xavier Peter MD   Admit date:  5/17/2018    Room Number  742/01  @ [facility]   Date of Service  5/26/2018            HISTORY         REASON FOR HOSPITALIZATION:  CC: \"agitation, delusions and dario\". Pt admitted under a temporary care home order (TDO)  For severe psychosis and dario proving to be an imminent danger to self and others and an inability to care for self. HISTORY OF PRESENT ILLNESS:    The patient, Earnestine Dale, is a 75 y.o. BLACK OR  female with a past psychiatric history significant for bipolar d/o , who presents at this time with complaints of (and/or evidence of) the following emotional symptoms: agitation, delusions and psychotic behavior. Additional symptomatology include agitation. The above symptoms have been present for 3 days . These symptoms are of severe severity. These symptoms are constant  in nature. The patient's condition has been precipitated by medication adjustments and psychosocial stressors (family conflicts  ). Patient's condition made worse by treatment noncompliance. UDS: MJ negative; BAL=0.     5/19/18 she is doing better and mood is ann marie and no anger issues and no self harm behavior sne she still has mood instability and gets angry and agitated sometimes  5/20/18 she is hyperverbal and agitated and she gets manic and rambling and she has flight of ideas , compliant with medications and no SI or I    5/21/18- Yesterday had some agitation and poor directability. Mkes obscene comments under her breath. Can also be charming, pleasant, and quite cooperative. 5/22/18- Med and meal compliant. Redirectable. Visible in milieu. 5/23/18-Very p;easant with maggie correa of humor. Social with peers. Has poor insight that she uses profanity and may offend others. But she is not doing this to be provocative. 5/24/18- Pleasant and cooperative. Med meal and group compliant. No side effects  5/25/18- Well mannered and very pleasant. Occasionally pressured and loud non offensive speech. Mood is good and is redirectable. 5/26/18  Mrs. Michell Cr remains labile. Resting comfortably this morning. ALLERGIES: No Known Allergies   MEDICATIONS PRIOR TO ADMISSION:   Prescriptions Prior to Admission   Medication Sig    chlorproMAZINE (THORAZINE) 200 mg tablet Take 200 mg by mouth daily. With 400mg QPM    chlorproMAZINE (THORAZINE) 200 mg tablet Take 400 mg by mouth every evening.  haloperidol decanoate (HALDOL DECANOATE) 50 mg/mL injection 37.5 mg by IntraMUSCular route every four (4) weeks. Indications: Schizophrenia    divalproex DR (DEPAKOTE) 250 mg tablet Take 250 mg by mouth nightly.  ibuprofen (MOTRIN) 800 mg tablet Take 800 mg by mouth every eight (8) hours as needed for Pain.  simvastatin (ZOCOR) 20 mg tablet Take 20 mg by mouth nightly.  levothyroxine (SYNTHROID) 25 mcg tablet Take 25 mcg by mouth Daily (before breakfast).  melatonin tab tablet Take 10 mg by mouth nightly.  losartan (COZAAR) 50 mg tablet Take 50 mg by mouth daily.  albuterol (PROVENTIL HFA, VENTOLIN HFA, PROAIR HFA) 90 mcg/actuation inhaler Take 1-2 Puffs by inhalation every four (4) hours as needed for Wheezing or Shortness of Breath.  senna-docusate (PERICOLACE) 8.6-50 mg per tablet Take 2 Tabs by mouth two (2) times a day.  amLODIPine (NORVASC) 10 mg tablet Take 10 mg by mouth daily. PAST MEDICAL HISTORY:   Past Medical History:   Diagnosis Date    Asthma     Bipolar 1 disorder ([facility] [address]    No past surgical history on file. SOCIAL HISTORY:    Social History     Social History    Marital status:      Spouse name: N/A    Number of children: N/A    Years of education: N/A     Occupational History    Not on file.      Social History Main Topics    Smoking status: Never Smoker    Smokeless tobacco: Not on file    Alcohol use No    Drug use: No    Sexual activity: Not Currently     Other Topics Concern    Not on file     Social History Narrative    75year old AA female admitted on TDO for agitation, making threats and having a fixed delusion that she is . She has been making racial remarks and placing herself in danger. Pt has hx of treatment with Depakote, Thorazine and Haldol Dec.      FAMILY HISTORY:    No family history on file. REVIEW OF SYSTEMS:   Psychological ROS: positive for - irritability  Respiratory ROS: no cough, shortness of breath, or wheezing  Cardiovascular ROS: no chest pain or dyspnea on exertion  Pertinent items are noted in the History of Present Illness. All other Systems reviewed and are considered negative. MENTAL STATUS EXAM & VITALS     MENTAL STATUS EXAM (MSE):    MSE FINDINGS ARE WITHIN NORMAL LIMITS (WNL) UNLESS OTHERWISE STATED BELOW. ( ALL OF THE BELOW CATEGORIES OF THE MSE HAVE BEEN REVIEWED (reviewed 5/26/2018) AND UPDATED AS DEEMED APPROPRIATE )  General Presentation age appropriate, cooperative   Orientation oriented to time, place and person   Vital Signs  See below (reviewed 5/26/2018); Vital Signs (BP, Pulse, & Temp) are within normal limits if not listed below.    Gait and Station Stable/steady, no ataxia   Musculoskeletal System No extrapyramidal symptoms (EPS); no abnormal muscular movements or Tardive Dyskinesia (TD); muscle strength and tone are within normal limits   Language No aphasia or dysarthria   Speech:  normal pitch and normal volume   Thought Processes concrete; normal rate of thoughts; poor abstract reasoning/computation   Thought Associations goal directed   Thought Content Did not show delusion today    Suicidal Ideations none   Homicidal Ideations none   Mood:  irritable   Affect:  mood-congruent   Memory recent  fair   Memory remote:  fair   Concentration/Attention:  distractable   Fund of Knowledge average   Insight:  limited   Reliability fair   Judgment:  limited          VITALS:     Patient Vitals for the past 24 hrs:   Temp Pulse Resp BP SpO2   05/26/18 2059 97 °F (36.1 °C) 100 16 (!) 140/99 98 %   05/26/18 1653 98.2 °F (36.8 °C) 100 16 (!) 168/100 96 %   05/26/18 0825 98.6 °F (37 °C) (!) 102 16 (!) 145/109 99 %     Wt Readings from Last 3 Encounters:   05/20/18 76.2 kg (168 lb)   01/01/15 83.9 kg (185 lb)     Temp Readings from Last 3 Encounters:   05/26/18 97 °F (36.1 °C)   01/01/15 97.8 °F (36.6 °C)   12/27/13 97 °F (36.1 °C)     BP Readings from Last 3 Encounters:   05/26/18 (!) 140/99   01/01/15 103/65   12/27/13 118/83     Pulse Readings from Last 3 Encounters:   05/26/18 100   01/01/15 87   12/27/13 96            DATA     LABORATORY DATA:  Labs Reviewed   METABOLIC PANEL, COMPREHENSIVE - Abnormal; Notable for the following:        Result Value    BUN/Creatinine ratio 21 (*)     Protein, total 6.0 (*)     Albumin 3.0 (*)     A-G Ratio 1.0 (*)     All other components within normal limits   CBC W/O DIFF - Abnormal; Notable for the following:     WBC 3.2 (*)     All other components within normal limits   VALPROIC ACID - Abnormal; Notable for the following:     Valproic acid 44 (*)     All other components within normal limits   GLUCOSE, FASTING   TSH 3RD GENERATION   LIPID PANEL     Admission on 05/17/2018   Component Date Value Ref Range Status    Sodium 05/18/2018 137  136 - 145 mmol/L Final    Potassium 05/18/2018 3.8  3.5 - 5.1 mmol/L Final    Chloride 05/18/2018 102  97 - 108 mmol/L Final    CO2 05/18/2018 28  21 - 32 mmol/L Final    Anion gap 05/18/2018 7  5 - 15 mmol/L Final    Glucose 05/18/2018 81  65 - 100 mg/dL Final    BUN 05/18/2018 12  6 - 20 MG/DL Final    Creatinine 05/18/2018 0.56  0.55 - 1.02 MG/DL Final    BUN/Creatinine ratio 05/18/2018 21* 12 - 20   Final    GFR est AA 05/18/2018 >60  >60 ml/min/1.73m2 Final    GFR est non-AA 05/18/2018 >60  >60 ml/min/1.73m2 Final    Calcium 05/18/2018 8.6  8.5 - 10.1 MG/DL Final    Bilirubin, total 05/18/2018 0.2  0.2 - 1.0 MG/DL Final    ALT (SGPT) 05/18/2018 20  12 - 78 U/L Final    AST (SGOT) 05/18/2018 17  15 - 37 U/L Final    Alk. phosphatase 05/18/2018 95  45 - 117 U/L Final    Protein, total 05/18/2018 6.0* 6.4 - 8.2 g/dL Final    Albumin 05/18/2018 3.0* 3.5 - 5.0 g/dL Final    Globulin 05/18/2018 3.0  2.0 - 4.0 g/dL Final    A-G Ratio 05/18/2018 1.0* 1.1 - 2.2   Final    Glucose 05/18/2018 81  65 - 100 MG/DL Final    WBC 05/18/2018 3.2* 3.6 - 11.0 K/uL Final    RBC 05/18/2018 4.10  3.80 - 5.20 M/uL Final    HGB 05/18/2018 12.1  11.5 - 16.0 g/dL Final    HCT 05/18/2018 36.3  35.0 - 47.0 % Final    MCV 05/18/2018 88.5  80.0 - 99.0 FL Final    MCH 05/18/2018 29.5  26.0 - 34.0 PG Final    MCHC 05/18/2018 33.3  30.0 - 36.5 g/dL Final    RDW 05/18/2018 13.2  11.5 - 14.5 % Final    PLATELET 41/57/8338 461  150 - 400 K/uL Final    MPV 05/18/2018 9.0  8.9 - 12.9 FL Final    NRBC 05/18/2018 0.0  0  WBC Final    ABSOLUTE NRBC 05/18/2018 0.00  0.00 - 0.01 K/uL Final    TSH 05/18/2018 1.11  0.36 - 3.74 uIU/mL Final    LIPID PROFILE 05/18/2018        Final    Cholesterol, total 05/18/2018 132  <200 MG/DL Final    Triglyceride 05/18/2018 40  <150 MG/DL Final    HDL Cholesterol 05/18/2018 73  MG/DL Final    LDL, calculated 05/18/2018 51  0 - 100 MG/DL Final    VLDL, calculated 05/18/2018 8  MG/DL Final    CHOL/HDL Ratio 05/18/2018 1.8  0 - 5.0   Final    Valproic acid 05/22/2018 44* 50 - 100 ug/ml Final        RADIOLOGY REPORTS:    Results from Hospital Encounter encounter on 05/17/18   XR CHEST PA LAT   Narrative INDICATION:  cough     Exam: Chest 2 views. Comparison: None. Findings: Cardiomediastinal silhouette is normal. Pulmonary vasculature is not  engorged. No focal parenchymal opacities, effusions, or pneumothorax. Bony  thorax is intact. Impression Impression:  1.  No acute cardiopulmonary disease      No results found.            MEDICATIONS       ALL MEDICATIONS  Current Facility-Administered Medications   Medication Dose Route Frequency    divalproex ER (DEPAKOTE ER) 24 hour tablet 500 mg  500 mg Oral QHS    haloperidol decanoate (HALDOL DECANOATE) 50 mg/mL injection 50 mg  50 mg IntraMUSCular Q28D    benzonatate (TESSALON) capsule 100 mg  100 mg Oral TID PRN    ibuprofen (MOTRIN) tablet 800 mg  800 mg Oral Q8H PRN    levothyroxine (SYNTHROID) tablet 25 mcg  25 mcg Oral ACB    chlorproMAZINE (THORAZINE) tablet 200 mg  200 mg Oral 7am    chlorproMAZINE (THORAZINE) tablet 400 mg  400 mg Oral QPM    albuterol (PROVENTIL VENTOLIN) nebulizer solution 2.5 mg  2.5 mg Nebulization Q4H PRN    OLANZapine (ZyPREXA) tablet 2.5 mg  2.5 mg Oral Q6H PRN    ziprasidone (GEODON) 10 mg in sterile water (preservative free) 0.5 mL injection  10 mg IntraMUSCular BID PRN    benztropine (COGENTIN) tablet 1 mg  1 mg Oral BID PRN    benztropine (COGENTIN) injection 1 mg  1 mg IntraMUSCular BID PRN    zolpidem (AMBIEN) tablet 5 mg  5 mg Oral QHS PRN    acetaminophen (TYLENOL) tablet 650 mg  650 mg Oral Q4H PRN    magnesium hydroxide (MILK OF MAGNESIA) 400 mg/5 mL oral suspension 30 mL  30 mL Oral DAILY PRN    nicotine (NICODERM CQ) 21 mg/24 hr patch 1 Patch  1 Patch TransDERmal DAILY PRN    senna-docusate (PERICOLACE) 8.6-50 mg per tablet 1 Tab  1 Tab Oral QHS    simvastatin (ZOCOR) tablet 20 mg  20 mg Oral QHS      SCHEDULED MEDICATIONS  Current Facility-Administered Medications   Medication Dose Route Frequency    divalproex ER (DEPAKOTE ER) 24 hour tablet 500 mg  500 mg Oral QHS    haloperidol decanoate (HALDOL DECANOATE) 50 mg/mL injection 50 mg  50 mg IntraMUSCular Q28D    levothyroxine (SYNTHROID) tablet 25 mcg  25 mcg Oral ACB    chlorproMAZINE (THORAZINE) tablet 200 mg  200 mg Oral 7am    chlorproMAZINE (THORAZINE) tablet 400 mg  400 mg Oral QPM    senna-docusate (PERICOLACE) 8.6-50 mg per tablet 1 Tab  1 Tab Oral QHS    simvastatin (ZOCOR) tablet 20 mg  20 mg Oral QHS                ASSESSMENT & PLAN        The patient, Angie Carbajal, is a 75 y.o.  female who presents at this time for treatment of the following diagnoses:  Patient Active Hospital Problem List:   Bipolar disorder ([facility] [address] (5/17/2018)    Assessment: dario alternating with depression     Plan: mood stabilizer/ antipsychotic   5/19/18 continue same medications    5/20/18 continue same medication and check Depakote level tomorrow            I will continue to monitor blood levels (Depakote, Tegretol, lithium, clozapine---a drug with a narrow therapeutic index= NTI) and associated labs for drug therapy implemented that require intense monitoring for toxicity as deemed appropriate based on current medication side effects and pharmacodynamically determined drug 1/2 lives. A coordinated, multidisplinary treatment team (includes the nurse, unit pharmcist,  and writer) round was conducted for this initial evaluation with the patient present. The following regarding medications was addressed during rounds with patient: depakote   the risks and benefits of the proposed medication. The patient was given the opportunity to ask questions. Informed consent given to the use of the above medications. I will continue to adjust psychiatric and non-psychiatric medications (see above \"medication\" section and orders section for details) as deemed appropriate & based upon diagnoses and response to treatment. I have reviewed admission (and previous/old) labs and medical tests in the EHR and or transferring hospital documents. I will continue to order blood tests/labs and diagnostic tests as deemed appropriate and review results as they become available (see orders for details). I have reviewed old psychiatric and medical records available in the EHR.  I Will order additional psychiatric records from other institutions to further elucidate the nature of patient's psychopathology and review once available. I will gather additional collateral information from friends, family and o/p treatment team to further elucidate the nature of patient's psychopathology and baselline level of psychiatric functioning.       ESTIMATED LENGTH OF STAY:    3-5 days        STRENGTHS:  Access to housing/residential stability, Knowledge of medications and Motivated and ready for change                                        SIGNED:    Aubree Lewsi MD  5/26/2018

## 2018-05-27 NOTE — BH NOTES
PRN Medication Documentation    Specific patient behavior that led to need for PRN medication: trouble sleeping  Staff interventions attempted prior to PRN being given: decrease stimuli, relaxation  PRN medication given: Ambien  Patient response/effectiveness of PRN medication: Good

## 2018-05-27 NOTE — PROGRESS NOTES
Problem: Altered Thought Process (Adult/Pediatric)  Goal: *STG: Participates in treatment plan  Outcome: Progressing Towards Goal  Pt in the dining area sitting. Pt kissing different things today (books, clothing, walker, socks). Pt not as talkative this evening. Pt ate dinner at the table with everyone. Pt then went back to her room and was quiet. Pt compliant with meal and meds.

## 2018-05-27 NOTE — PROGRESS NOTES
Problem: Altered Thought Process (Adult/Pediatric)  Goal: *STG: Participates in treatment plan  Outcome: Not Progressing Towards Goal  Pt is cooperative and redirectable.   Remains Paranoid & delusional  AH and VH  Eats 100% dinner, fluids and snacks  Med/Meal compliant  No S/I  Will continue to monitor

## 2018-05-27 NOTE — INTERDISCIPLINARY ROUNDS
Behavioral Health Interdisciplinary Rounds     Patient Name: Nneka Sales  Age: 75 y.o.   Room/Bed:  742/  Primary Diagnosis: [facility]   Admission Status: Involuntary Commitment     Readmission within 30 days: no  Power of  in place: no  Patient requires a blocked bed: yes          Reason for blocked bed: loud, disruptive     VTE Prophylaxis: Not indicated    Mobility needs/Fall risk: yes    Nutritional Plan: no  Consults: no         Labs/Testing due today?: no    Sleep hours: 3.5        Participation in Care/Groups:  yes  Medication Compliant?: Yes  PRNS (last 24 hours): Sleep Aid, Tessalon    Restraints (last 24 hours):  no     CIWA (range last 24 hours):     COWS (range last 24 hours):      Alcohol screening (AUDIT) completed -   AUDIT Score: 0     If applicable, date SBIRT discussed in treatment team AND documented:     Tobacco - patient is a smoker: Have You Used Tobacco in the Past 30 Days: No  Illegal Drugs use: Have You Used Any Illegal Substances Over the Past 12 Months: No    24 hour chart check complete: yes     Patient goal(s) for today:   Treatment team focus/goals:   Progress note     LOS:  10  Expected LOS:     Financial concerns/prescription coverage:    Date of last family contact:      Family requesting physician contact today:    Discharge plan  Guns in the home:       Outpatient provider(s):     Participating treatment team members: Nneka Sales, * (assigned SW),

## 2018-05-27 NOTE — PROGRESS NOTES
Problem: Falls - Risk of  Goal: *Absence of Falls  Document Jovana Fall Risk and appropriate interventions in the flowsheet. Outcome: Progressing Towards Goal  Fall Risk Interventions:  Mobility Interventions: Patient to call before getting OOB, Utilize walker, cane, or other assitive device    Mentation Interventions: Door open when patient unattended, Adequate sleep, hydration, pain control    Medication Interventions: Teach patient to arise slowly    2330 Pt is alert sitting in chair in room. Talking to self. NAD noted. Call bell within reach, light remains on. Will monitor with Q 15 safety checks.

## 2018-05-28 PROCEDURE — 74011250637 HC RX REV CODE- 250/637: Performed by: NURSE PRACTITIONER

## 2018-05-28 PROCEDURE — 65220000003 HC RM SEMIPRIVATE PSYCH

## 2018-05-28 PROCEDURE — 74011250637 HC RX REV CODE- 250/637: Performed by: PSYCHIATRY & NEUROLOGY

## 2018-05-28 PROCEDURE — 74011000250 HC RX REV CODE- 250: Performed by: PSYCHIATRY & NEUROLOGY

## 2018-05-28 PROCEDURE — 74011250636 HC RX REV CODE- 250/636: Performed by: PSYCHIATRY & NEUROLOGY

## 2018-05-28 RX ORDER — VALPROIC ACID 250 MG/5ML
250 SOLUTION ORAL 3 TIMES DAILY
Status: DISCONTINUED | OUTPATIENT
Start: 2018-05-28 | End: 2018-06-03

## 2018-05-28 RX ORDER — CHLORPROMAZINE HYDROCHLORIDE 50 MG/1
100 TABLET, FILM COATED ORAL
Status: DISCONTINUED | OUTPATIENT
Start: 2018-05-29 | End: 2018-05-29

## 2018-05-28 RX ORDER — HALOPERIDOL 2 MG/ML
0.5 SOLUTION ORAL 3 TIMES DAILY
Status: DISCONTINUED | OUTPATIENT
Start: 2018-05-28 | End: 2018-05-30

## 2018-05-28 RX ORDER — CHLORPROMAZINE HYDROCHLORIDE 50 MG/1
200 TABLET, FILM COATED ORAL EVERY EVENING
Status: DISCONTINUED | OUTPATIENT
Start: 2018-05-28 | End: 2018-05-30

## 2018-05-28 RX ADMIN — OLANZAPINE 2.5 MG: 2.5 TABLET, FILM COATED ORAL at 03:38

## 2018-05-28 RX ADMIN — VALPROIC ACID 250 MG: 250 SOLUTION ORAL at 21:03

## 2018-05-28 RX ADMIN — SIMVASTATIN 20 MG: 20 TABLET, FILM COATED ORAL at 21:03

## 2018-05-28 RX ADMIN — LEVOTHYROXINE SODIUM 25 MCG: 25 TABLET ORAL at 06:35

## 2018-05-28 RX ADMIN — CHLORPROMAZINE HYDROCHLORIDE 200 MG: 50 TABLET, SUGAR COATED ORAL at 17:36

## 2018-05-28 RX ADMIN — CHLORPROMAZINE HYDROCHLORIDE 200 MG: 50 TABLET, SUGAR COATED ORAL at 06:35

## 2018-05-28 RX ADMIN — HALOPERIDOL 0.5 MG: 2 SOLUTION ORAL at 14:12

## 2018-05-28 RX ADMIN — HALOPERIDOL 0.5 MG: 2 SOLUTION ORAL at 21:03

## 2018-05-28 RX ADMIN — ACETAMINOPHEN 650 MG: 325 TABLET ORAL at 03:38

## 2018-05-28 RX ADMIN — BENZONATATE 100 MG: 100 CAPSULE ORAL at 16:36

## 2018-05-28 RX ADMIN — STANDARDIZED SENNA CONCENTRATE AND DOCUSATE SODIUM 1 TABLET: 8.6; 5 TABLET, FILM COATED ORAL at 21:03

## 2018-05-28 RX ADMIN — VALPROIC ACID 250 MG: 250 SOLUTION ORAL at 14:12

## 2018-05-28 RX ADMIN — WATER 10 MG: 1 INJECTION INTRAMUSCULAR; INTRAVENOUS; SUBCUTANEOUS at 12:03

## 2018-05-28 RX ADMIN — ZOLPIDEM TARTRATE 5 MG: 5 TABLET ORAL at 21:47

## 2018-05-28 RX ADMIN — BENZONATATE 100 MG: 100 CAPSULE ORAL at 03:40

## 2018-05-28 NOTE — BH NOTES
PRN Medication Documentation  Specific patient behavior that led to need for PRN medication: c/o difficulty sleeping  of rt hip pain , occasional dry  cough   Staff interventions attempted prior to PRN being given: offered po fluids , change in position   PRN medication given: tylenol 650 mg po , Tessalon 100 mg po   Patient response/effectiveness of PRN medication: pending

## 2018-05-28 NOTE — BH NOTES
Received pt very restless and hallucinating. Note pt spitting on floor and tried to spit into staff pocket. Very agitated at this point. Difficult to redirect behav  Pt states\" [address] ME SICK!!!!\"  Note pt very agitated ,coughing excessive.   Making herself cough,difficult to redirect

## 2018-05-28 NOTE — INTERDISCIPLINARY ROUNDS
Behavioral Health Interdisciplinary Rounds     Patient Name: Andrew Stallings  Age: 75 y.o.   Room/Bed:  742/01  Primary Diagnosis: Bipolar disorder ([facility] [address]   Admission Status: Involuntary Commitment     Readmission within 30 days: no  Power of  in place: no  Patient requires a blocked bed: yes          Reason for blocked bed:     VTE Prophylaxis: Not indicated    Mobility needs/Fall risk: yes    Nutritional Plan: no  Consults:        Labs/Testing due today?: no    Sleep hours:  3 1/2 hours       Participation in Care/Groups:  yes  Medication Compliant?: Yes  PRNS (last 24 hours): ambien    Restraints (last 24 hours):  no     CIWA (range last 24 hours):     COWS (range last 24 hours):      Alcohol screening (AUDIT) completed -   AUDIT Score: 0     If applicable, date SBIRT discussed in treatment team AND documented:     Tobacco - patient is a smoker: Have You Used Tobacco in the Past 30 Days: No  Illegal Drugs use: Have You Used Any Illegal Substances Over the Past 12 Months: No    24 hour chart check complete: yes     Patient goal(s) for today:   Treatment team focus/goals: Continue to titrate medications to manage behavior  Progress note     LOS:  11  Expected LOS: TBD    Financial concerns/prescription coverage:  Medicare  Date of last family contact: 5/24 SW spoke to family     Family requesting physician contact today:   Discharge plan: Return home to malachi Kim in the home: No      Outpatient provider(s): [facility]    Participating treatment team members: KATHARINE Brizuela; Dr. Andrew Holliday MD; Héctor Swanson RN

## 2018-05-28 NOTE — BH NOTES
GROUP THERAPY PROGRESS NOTE    Kiran saunders participated in a morning Process Group on the Geriatric Unit, with a focus identifying feelings, planning for the day, and singing. Group time: 45 minutes. Personal goal for participation: To increase the capacity to shift ones mood, prepare for the day, and share in group singing. Goal orientation: The patient will be able to prepare for the day through group singing. Group therapy participation: When prompted, this patient partially participated in the group for only a couple of minutes. Therapeutic interventions reviewed and discussed: The group members were introduce themselves by first names and participate in group singing as a way to increase their oxygen and blood flow and begin their day on a positive note. They were also asked to join in singing several songs. Impression of participation: The patient joined the group about ten minutes after it started. She was complaining with a moan on every step to the group as if she were in pain. She initially said she was feeling \"great\" but then continued with a garbled form of speech that seemed to go on and on. She picked up the crayons and scattered them while continuing to speak in a confusing fashion (word salad?). She expressed no current SI/HI and may have been responding to internal stimuli. Her affect was manic and her mood matched her affect. She was escorted back to her room by nursing. She was not able to tolerate group this morning.

## 2018-05-28 NOTE — BH NOTES
Psychiatric progress note            IDENTIFICATION:    Patient Name  Angie Carbajal   Date of Birth 1945   [facility] [id number]   Medical Record Number  [id number]      Age  75 y.o. PCP Sophia Pringle MD   Admit date:  5/17/2018    Room Number  742/01  @ [facility]   Date of Service  5/28/2018            HISTORY         REASON FOR HOSPITALIZATION:  CC: \"agitation, delusions and dario\". Pt admitted under a temporary halfway order (TDO)  For severe psychosis and dario proving to be an imminent danger to self and others and an inability to care for self. HISTORY OF PRESENT ILLNESS:    The patient, Angie Carbajal, is a 75 y.o. BLACK OR  female with a past psychiatric history significant for bipolar d/o , who presents at this time with complaints of (and/or evidence of) the following emotional symptoms: agitation, delusions and psychotic behavior. Additional symptomatology include agitation. The above symptoms have been present for 3 days . These symptoms are of severe severity. These symptoms are constant  in nature. The patient's condition has been precipitated by medication adjustments and psychosocial stressors (family conflicts  ). Patient's condition made worse by treatment noncompliance. UDS: MJ negative; BAL=0.     5/19/18 she is doing better and mood is ann marie and no anger issues and no self harm behavior sne she still has mood instability and gets angry and agitated sometimes  5/20/18 she is hyperverbal and agitated and she gets manic and rambling and she has flight of ideas , compliant with medications and no SI or I    5/21/18- Yesterday had some agitation and poor directability. Mkes obscene comments under her breath. Can also be charming, pleasant, and quite cooperative. 5/22/18- Med and meal compliant. Redirectable. Visible in milieu. 5/23/18-Very p;easant with agood brene of humor. Social with peers. Has poor insight that she uses profanity and may offend others. But she is not doing this to be provocative. 5/24/18- Pleasant and cooperative. Med meal and group compliant. No side effects  5/25/18- Well mannered and very pleasant. Occasionally pressured and loud non offensive speech. Mood is good and is redirectable. 5/26/18  Mrs. Joellen Sy remains labile. Resting comfortably this morning.  5/2718- Very manic, loud, disorganized and intrusive but redirectable  5/28/18 she is really manic , disorganized , screaming and yelling and not sleeping well , receive many prn medications and sounds her high dosage of thorazine is not working , she still paranoid and she spitting on the floor and licking her spit ,     ALLERGIES: No Known Allergies   MEDICATIONS PRIOR TO ADMISSION:   Prescriptions Prior to Admission   Medication Sig    chlorproMAZINE (THORAZINE) 200 mg tablet Take 200 mg by mouth daily. With 400mg QPM    chlorproMAZINE (THORAZINE) 200 mg tablet Take 400 mg by mouth every evening.  haloperidol decanoate (HALDOL DECANOATE) 50 mg/mL injection 37.5 mg by IntraMUSCular route every four (4) weeks. Indications: Schizophrenia    divalproex DR (DEPAKOTE) 250 mg tablet Take 250 mg by mouth nightly.  ibuprofen (MOTRIN) 800 mg tablet Take 800 mg by mouth every eight (8) hours as needed for Pain.  simvastatin (ZOCOR) 20 mg tablet Take 20 mg by mouth nightly.  levothyroxine (SYNTHROID) 25 mcg tablet Take 25 mcg by mouth Daily (before breakfast).  melatonin tab tablet Take 10 mg by mouth nightly.  losartan (COZAAR) 50 mg tablet Take 50 mg by mouth daily.  albuterol (PROVENTIL HFA, VENTOLIN HFA, PROAIR HFA) 90 mcg/actuation inhaler Take 1-2 Puffs by inhalation every four (4) hours as needed for Wheezing or Shortness of Breath.  senna-docusate (PERICOLACE) 8.6-50 mg per tablet Take 2 Tabs by mouth two (2) times a day.  amLODIPine (NORVASC) 10 mg tablet Take 10 mg by mouth daily.       PAST MEDICAL HISTORY:   Past Medical History:   Diagnosis Date    Asthma  Bipolar 1 disorder ([facility] [address]    No past surgical history on file. SOCIAL HISTORY:    Social History     Social History    Marital status:      Spouse name: N/A    Number of children: N/A    Years of education: N/A     Occupational History    Not on file. Social History Main Topics    Smoking status: Never Smoker    Smokeless tobacco: Not on file    Alcohol use No    Drug use: No    Sexual activity: Not Currently     Other Topics Concern    Not on file     Social History Narrative    75year old AA female admitted on TDO for agitation, making threats and having a fixed delusion that she is . She has been making racial remarks and placing herself in danger. Pt has hx of treatment with Depakote, Thorazine and Haldol Dec.      FAMILY HISTORY:    No family history on file. REVIEW OF SYSTEMS:   Psychological ROS: positive for - irritability  Respiratory ROS: no cough, shortness of breath, or wheezing  Cardiovascular ROS: no chest pain or dyspnea on exertion  Pertinent items are noted in the History of Present Illness. All other Systems reviewed and are considered negative. MENTAL STATUS EXAM & VITALS     MENTAL STATUS EXAM (MSE):    MSE FINDINGS ARE WITHIN NORMAL LIMITS (WNL) UNLESS OTHERWISE STATED BELOW. ( ALL OF THE BELOW CATEGORIES OF THE MSE HAVE BEEN REVIEWED (reviewed 5/28/2018) AND UPDATED AS DEEMED APPROPRIATE )  General Presentation age appropriate, cooperative   Orientation oriented to time, place and person   Vital Signs  See below (reviewed 5/28/2018); Vital Signs (BP, Pulse, & Temp) are within normal limits if not listed below.    Gait and Station Stable/steady, no ataxia   Musculoskeletal System No extrapyramidal symptoms (EPS); no abnormal muscular movements or Tardive Dyskinesia (TD); muscle strength and tone are within normal limits   Language No aphasia or dysarthria   Speech:  normal pitch and normal volume   Thought Processes concrete; normal rate of thoughts; poor abstract reasoning/computation   Thought Associations goal directed   Thought Content Did not show delusion today    Suicidal Ideations none   Homicidal Ideations none   Mood:  irritable   Affect:  mood-congruent   Memory recent  fair   Memory remote:  fair   Concentration/Attention:  distractable   Fund of Knowledge average   Insight:  limited   Reliability fair   Judgment:  limited          VITALS:     Patient Vitals for the past 24 hrs:   Temp Pulse Resp BP SpO2   05/28/18 0752 98.3 °F (36.8 °C) 99 16 106/71 98 %   05/27/18 2145 98.1 °F (36.7 °C) 90 18 131/74 96 %   05/27/18 1631 97.8 °F (36.6 °C) 84 - 144/79 -   05/27/18 1602 97.8 °F (36.6 °C) 84 18 (!) 176/98 98 %     Wt Readings from Last 3 Encounters:   05/27/18 76.2 kg (168 lb)   01/01/15 83.9 kg (185 lb)     Temp Readings from Last 3 Encounters:   05/28/18 98.3 °F (36.8 °C)   01/01/15 97.8 °F (36.6 °C)   12/27/13 97 °F (36.1 °C)     BP Readings from Last 3 Encounters:   05/28/18 106/71   01/01/15 103/65   12/27/13 118/83     Pulse Readings from Last 3 Encounters:   05/28/18 99   01/01/15 87   12/27/13 96            DATA     LABORATORY DATA:  Labs Reviewed   METABOLIC PANEL, COMPREHENSIVE - Abnormal; Notable for the following:        Result Value    BUN/Creatinine ratio 21 (*)     Protein, total 6.0 (*)     Albumin 3.0 (*)     A-G Ratio 1.0 (*)     All other components within normal limits   CBC W/O DIFF - Abnormal; Notable for the following:     WBC 3.2 (*)     All other components within normal limits   VALPROIC ACID - Abnormal; Notable for the following:     Valproic acid 44 (*)     All other components within normal limits   GLUCOSE, FASTING   TSH 3RD GENERATION   LIPID PANEL     Admission on 05/17/2018   Component Date Value Ref Range Status    Sodium 05/18/2018 137  136 - 145 mmol/L Final    Potassium 05/18/2018 3.8  3.5 - 5.1 mmol/L Final    Chloride 05/18/2018 102  97 - 108 mmol/L Final    CO2 05/18/2018 28  21 - 32 mmol/L Final  Anion gap 05/18/2018 7  5 - 15 mmol/L Final    Glucose 05/18/2018 81  65 - 100 mg/dL Final    BUN 05/18/2018 12  6 - 20 MG/DL Final    Creatinine 05/18/2018 0.56  0.55 - 1.02 MG/DL Final    BUN/Creatinine ratio 05/18/2018 21* 12 - 20   Final    GFR est AA 05/18/2018 >60  >60 ml/min/1.73m2 Final    GFR est non-AA 05/18/2018 >60  >60 ml/min/1.73m2 Final    Calcium 05/18/2018 8.6  8.5 - 10.1 MG/DL Final    Bilirubin, total 05/18/2018 0.2  0.2 - 1.0 MG/DL Final    ALT (SGPT) 05/18/2018 20  12 - 78 U/L Final    AST (SGOT) 05/18/2018 17  15 - 37 U/L Final    Alk.  phosphatase 05/18/2018 95  45 - 117 U/L Final    Protein, total 05/18/2018 6.0* 6.4 - 8.2 g/dL Final    Albumin 05/18/2018 3.0* 3.5 - 5.0 g/dL Final    Globulin 05/18/2018 3.0  2.0 - 4.0 g/dL Final    A-G Ratio 05/18/2018 1.0* 1.1 - 2.2   Final    Glucose 05/18/2018 81  65 - 100 MG/DL Final    WBC 05/18/2018 3.2* 3.6 - 11.0 K/uL Final    RBC 05/18/2018 4.10  3.80 - 5.20 M/uL Final    HGB 05/18/2018 12.1  11.5 - 16.0 g/dL Final    HCT 05/18/2018 36.3  35.0 - 47.0 % Final    MCV 05/18/2018 88.5  80.0 - 99.0 FL Final    MCH 05/18/2018 29.5  26.0 - 34.0 PG Final    MCHC 05/18/2018 33.3  30.0 - 36.5 g/dL Final    RDW 05/18/2018 13.2  11.5 - 14.5 % Final    PLATELET 21/94/2494 425  150 - 400 K/uL Final    MPV 05/18/2018 9.0  8.9 - 12.9 FL Final    NRBC 05/18/2018 0.0  0  WBC Final    ABSOLUTE NRBC 05/18/2018 0.00  0.00 - 0.01 K/uL Final    TSH 05/18/2018 1.11  0.36 - 3.74 uIU/mL Final    LIPID PROFILE 05/18/2018        Final    Cholesterol, total 05/18/2018 132  <200 MG/DL Final    Triglyceride 05/18/2018 40  <150 MG/DL Final    HDL Cholesterol 05/18/2018 73  MG/DL Final    LDL, calculated 05/18/2018 51  0 - 100 MG/DL Final    VLDL, calculated 05/18/2018 8  MG/DL Final    CHOL/HDL Ratio 05/18/2018 1.8  0 - 5.0   Final    Valproic acid 05/22/2018 44* 50 - 100 ug/ml Final        RADIOLOGY REPORTS:    [facility]   XR CHEST PA LAT   Narrative INDICATION:  cough     Exam: Chest 2 views. Comparison: None. Findings: Cardiomediastinal silhouette is normal. Pulmonary vasculature is not  engorged. No focal parenchymal opacities, effusions, or pneumothorax. Bony  thorax is intact. Impression Impression:  1. No acute cardiopulmonary disease      No results found.            MEDICATIONS       ALL MEDICATIONS  Current Facility-Administered Medications   Medication Dose Route Frequency    haloperidol (HALDOL) 2 mg/mL oral solution 0.5 mg  0.5 mg Oral TID    chlorproMAZINE (THORAZINE) tablet 200 mg  200 mg Oral QPM    [START ON 5/29/2018] chlorproMAZINE (THORAZINE) tablet 100 mg  100 mg Oral 7am    valproic acid (as sodium salt) (DEPAKENE) 250 mg/5 mL (5 mL) oral solution 250 mg  250 mg Oral TID    haloperidol decanoate (HALDOL DECANOATE) 50 mg/mL injection 50 mg  50 mg IntraMUSCular Q28D    benzonatate (TESSALON) capsule 100 mg  100 mg Oral TID PRN    ibuprofen (MOTRIN) tablet 800 mg  800 mg Oral Q8H PRN    levothyroxine (SYNTHROID) tablet 25 mcg  25 mcg Oral ACB    albuterol (PROVENTIL VENTOLIN) nebulizer solution 2.5 mg  2.5 mg Nebulization Q4H PRN    ziprasidone (GEODON) 10 mg in sterile water (preservative free) 0.5 mL injection  10 mg IntraMUSCular BID PRN    benztropine (COGENTIN) tablet 1 mg  1 mg Oral BID PRN    benztropine (COGENTIN) injection 1 mg  1 mg IntraMUSCular BID PRN    zolpidem (AMBIEN) tablet 5 mg  5 mg Oral QHS PRN    acetaminophen (TYLENOL) tablet 650 mg  650 mg Oral Q4H PRN    magnesium hydroxide (MILK OF MAGNESIA) 400 mg/5 mL oral suspension 30 mL  30 mL Oral DAILY PRN    nicotine (NICODERM CQ) 21 mg/24 hr patch 1 Patch  1 Patch TransDERmal DAILY PRN    senna-docusate (PERICOLACE) 8.6-50 mg per tablet 1 Tab  1 Tab Oral QHS    simvastatin (ZOCOR) tablet 20 mg  20 mg Oral QHS      SCHEDULED MEDICATIONS  Current Facility-Administered Medications Medication Dose Route Frequency    haloperidol (HALDOL) 2 mg/mL oral solution 0.5 mg  0.5 mg Oral TID    chlorproMAZINE (THORAZINE) tablet 200 mg  200 mg Oral QPM    [START ON 5/29/2018] chlorproMAZINE (THORAZINE) tablet 100 mg  100 mg Oral 7am    valproic acid (as sodium salt) (DEPAKENE) 250 mg/5 mL (5 mL) oral solution 250 mg  250 mg Oral TID    haloperidol decanoate (HALDOL DECANOATE) 50 mg/mL injection 50 mg  50 mg IntraMUSCular Q28D    levothyroxine (SYNTHROID) tablet 25 mcg  25 mcg Oral ACB    senna-docusate (PERICOLACE) 8.6-50 mg per tablet 1 Tab  1 Tab Oral QHS    simvastatin (ZOCOR) tablet 20 mg  20 mg Oral QHS                ASSESSMENT & PLAN        The patient, Lul Gomez, is a 75 y.o.  female who presents at this time for treatment of the following diagnoses:  Patient Active Hospital Problem List:   [facility] ([facility]17/2018)    Assessment: dario alternating with depression     Plan: mood stabilizer/ antipsychotic   5/19/18 continue same medications    5/20/18 continue same medication and check Depakote level tomorrow    5/28/18 will increase her depakene to 250 mg TID , Add Haldol 0.5 mg TID as she is already on Haldol Decanoate but still disorganized and paranoid , will go down on Thorazine and adjust the dosage of Haldol and depakene according to her response , will watch for EPS           I will continue to monitor blood levels (Depakote, Tegretol, lithium, clozapine---a drug with a narrow therapeutic index= NTI) and associated labs for drug therapy implemented that require intense monitoring for toxicity as deemed appropriate based on current medication side effects and pharmacodynamically determined drug 1/2 lives. A coordinated, multidisplinary treatment team (includes the nurse, unit pharmcist,  and writer) round was conducted for this initial evaluation with the patient present.      The following regarding medications was addressed during rounds with patient: depakote   the risks and benefits of the proposed medication. The patient was given the opportunity to ask questions. Informed consent given to the use of the above medications. I will continue to adjust psychiatric and non-psychiatric medications (see above \"medication\" section and orders section for details) as deemed appropriate & based upon diagnoses and response to treatment. I have reviewed admission (and previous/old) labs and medical tests in the EHR and or transferring hospital documents. I will continue to order blood tests/labs and diagnostic tests as deemed appropriate and review results as they become available (see orders for details). I have reviewed old psychiatric and medical records available in the EHR. I Will order additional psychiatric records from other institutions to further elucidate the nature of patient's psychopathology and review once available. I will gather additional collateral information from friends, family and o/p treatment team to further elucidate the nature of patient's psychopathology and baselline level of psychiatric functioning.       ESTIMATED LENGTH OF STAY:    3-5 days        STRENGTHS:  Access to housing/residential stability, Knowledge of medications and Motivated and ready for change                                        SIGNED:    Akanksha Chance MD  5/28/2018

## 2018-05-28 NOTE — PROGRESS NOTES
Problem: Falls - Risk of  Goal: *Absence of Falls  Document Jovana Fall Risk and appropriate interventions in the flowsheet.    Outcome: Progressing Towards Goal  Fall Risk Interventions:  Lying in bed talking aloud to self , on occasional rounds noted to be snoring but then quickly awake again , Q15 min safety rounds continue     Mobility Interventions: Bed/chair exit alarm    Mentation Interventions: Adequate sleep, hydration, pain control    Medication Interventions: Evaluate medications/consider consulting pharmacy

## 2018-05-28 NOTE — BH NOTES
PRN Medication Documentation    Specific patient behavior that led to need for PRN medication: help sleeping  Staff interventions attempted prior to PRN being given: relaxation  PRN medication given: Ambien  Patient response/effectiveness of PRN medication: Good

## 2018-05-28 NOTE — BH NOTES
Psychiatric progress note            IDENTIFICATION:    Patient Name  Carlos Tadeo   Date of Birth 1945   [facility] [id number]   Medical Record Number  [id number]      Age  75 y.o. PCP Micaela Allen MD   Admit date:  5/17/2018    Room Number  742/01  @ [facility]   Date of Service  5/27/2018            HISTORY         REASON FOR HOSPITALIZATION:  CC: \"agitation, delusions and dario\". Pt admitted under a temporary shelter order (TDO)  For severe psychosis and dario proving to be an imminent danger to self and others and an inability to care for self. HISTORY OF PRESENT ILLNESS:    The patient, Carlos Tadeo, is a 75 y.o. BLACK OR  female with a past psychiatric history significant for bipolar d/o , who presents at this time with complaints of (and/or evidence of) the following emotional symptoms: agitation, delusions and psychotic behavior. Additional symptomatology include agitation. The above symptoms have been present for 3 days . These symptoms are of severe severity. These symptoms are constant  in nature. The patient's condition has been precipitated by medication adjustments and psychosocial stressors (family conflicts  ). Patient's condition made worse by treatment noncompliance. UDS: MJ negative; BAL=0.     5/19/18 she is doing better and mood is ann marie and no anger issues and no self harm behavior sne she still has mood instability and gets angry and agitated sometimes  5/20/18 she is hyperverbal and agitated and she gets manic and rambling and she has flight of ideas , compliant with medications and no SI or I    5/21/18- Yesterday had some agitation and poor directability. Mkes obscene comments under her breath. Can also be charming, pleasant, and quite cooperative. 5/22/18- Med and meal compliant. Redirectable. Visible in milieu. 5/23/18-Very p;easant with maggie correa of humor. Social with peers. Has poor insight that she uses profanity and may offend others. But she is not doing this to be provocative. 5/24/18- Pleasant and cooperative. Med meal and group compliant. No side effects  5/25/18- Well mannered and very pleasant. Occasionally pressured and loud non offensive speech. Mood is good and is redirectable. 5/26/18  Mrs. Dot Ballesteros remains labile. Resting comfortably this morning.  5/2718- Very manic, loud, disorganized and intrusive but redirectable     ALLERGIES: No Known Allergies   MEDICATIONS PRIOR TO ADMISSION:   Prescriptions Prior to Admission   Medication Sig    chlorproMAZINE (THORAZINE) 200 mg tablet Take 200 mg by mouth daily. With 400mg QPM    chlorproMAZINE (THORAZINE) 200 mg tablet Take 400 mg by mouth every evening.  haloperidol decanoate (HALDOL DECANOATE) 50 mg/mL injection 37.5 mg by IntraMUSCular route every four (4) weeks. Indications: Schizophrenia    divalproex DR (DEPAKOTE) 250 mg tablet Take 250 mg by mouth nightly.  ibuprofen (MOTRIN) 800 mg tablet Take 800 mg by mouth every eight (8) hours as needed for Pain.  simvastatin (ZOCOR) 20 mg tablet Take 20 mg by mouth nightly.  levothyroxine (SYNTHROID) 25 mcg tablet Take 25 mcg by mouth Daily (before breakfast).  melatonin tab tablet Take 10 mg by mouth nightly.  losartan (COZAAR) 50 mg tablet Take 50 mg by mouth daily.  albuterol (PROVENTIL HFA, VENTOLIN HFA, PROAIR HFA) 90 mcg/actuation inhaler Take 1-2 Puffs by inhalation every four (4) hours as needed for Wheezing or Shortness of Breath.  senna-docusate (PERICOLACE) 8.6-50 mg per tablet Take 2 Tabs by mouth two (2) times a day.  amLODIPine (NORVASC) 10 mg tablet Take 10 mg by mouth daily. PAST MEDICAL HISTORY:   Past Medical History:   Diagnosis Date    Asthma     Bipolar 1 disorder ([facility] [address]    No past surgical history on file.    SOCIAL HISTORY:    Social History     Social History    Marital status:      Spouse name: N/A    Number of children: N/A    Years of education: N/A     Occupational History  Not on file. Social History Main Topics    Smoking status: Never Smoker    Smokeless tobacco: Not on file    Alcohol use No    Drug use: No    Sexual activity: Not Currently     Other Topics Concern    Not on file     Social History Narrative    75year old AA female admitted on TDO for agitation, making threats and having a fixed delusion that she is . She has been making racial remarks and placing herself in danger. Pt has hx of treatment with Depakote, Thorazine and Haldol Dec.      FAMILY HISTORY:    No family history on file. REVIEW OF SYSTEMS:   Psychological ROS: positive for - irritability  Respiratory ROS: no cough, shortness of breath, or wheezing  Cardiovascular ROS: no chest pain or dyspnea on exertion  Pertinent items are noted in the History of Present Illness. All other Systems reviewed and are considered negative. MENTAL STATUS EXAM & VITALS     MENTAL STATUS EXAM (MSE):    MSE FINDINGS ARE WITHIN NORMAL LIMITS (WNL) UNLESS OTHERWISE STATED BELOW. ( ALL OF THE BELOW CATEGORIES OF THE MSE HAVE BEEN REVIEWED (reviewed 5/27/2018) AND UPDATED AS DEEMED APPROPRIATE )  General Presentation age appropriate, cooperative   Orientation oriented to time, place and person   Vital Signs  See below (reviewed 5/27/2018); Vital Signs (BP, Pulse, & Temp) are within normal limits if not listed below.    Gait and Station Stable/steady, no ataxia   Musculoskeletal System No extrapyramidal symptoms (EPS); no abnormal muscular movements or Tardive Dyskinesia (TD); muscle strength and tone are within normal limits   Language No aphasia or dysarthria   Speech:  normal pitch and normal volume   Thought Processes concrete; normal rate of thoughts; poor abstract reasoning/computation   Thought Associations goal directed   Thought Content Did not show delusion today    Suicidal Ideations none   Homicidal Ideations none   Mood:  irritable   Affect:  mood-congruent   Memory recent  fair Memory remote:  fair   Concentration/Attention:  distractable   Fund of Knowledge average   Insight:  limited   Reliability fair   Judgment:  limited          VITALS:     Patient Vitals for the past 24 hrs:   Temp Pulse Resp BP SpO2   05/27/18 1631 - 90 - 144/79 -   05/27/18 1602 97.8 °F (36.6 °C) 84 18 (!) 176/98 98 %   05/27/18 0733 97.9 °F (36.6 °C) 98 18 149/87 98 %     Wt Readings from Last 3 Encounters:   05/27/18 76.2 kg (168 lb)   01/01/15 83.9 kg (185 lb)     Temp Readings from Last 3 Encounters:   05/27/18 97.8 °F (36.6 °C)   01/01/15 97.8 °F (36.6 °C)   12/27/13 97 °F (36.1 °C)     BP Readings from Last 3 Encounters:   05/27/18 144/79   01/01/15 103/65   12/27/13 118/83     Pulse Readings from Last 3 Encounters:   05/27/18 90   01/01/15 87   12/27/13 96            DATA     LABORATORY DATA:  Labs Reviewed   METABOLIC PANEL, COMPREHENSIVE - Abnormal; Notable for the following:        Result Value    BUN/Creatinine ratio 21 (*)     Protein, total 6.0 (*)     Albumin 3.0 (*)     A-G Ratio 1.0 (*)     All other components within normal limits   CBC W/O DIFF - Abnormal; Notable for the following:     WBC 3.2 (*)     All other components within normal limits   VALPROIC ACID - Abnormal; Notable for the following:     Valproic acid 44 (*)     All other components within normal limits   GLUCOSE, FASTING   TSH 3RD GENERATION   LIPID PANEL     Admission on 05/17/2018   Component Date Value Ref Range Status    Sodium 05/18/2018 137  136 - 145 mmol/L Final    Potassium 05/18/2018 3.8  3.5 - 5.1 mmol/L Final    Chloride 05/18/2018 102  97 - 108 mmol/L Final    CO2 05/18/2018 28  21 - 32 mmol/L Final    Anion gap 05/18/2018 7  5 - 15 mmol/L Final    Glucose 05/18/2018 81  65 - 100 mg/dL Final    BUN 05/18/2018 12  6 - 20 MG/DL Final    Creatinine 05/18/2018 0.56  0.55 - 1.02 MG/DL Final    BUN/Creatinine ratio 05/18/2018 21* 12 - 20   Final    GFR est AA 05/18/2018 >60  >60 ml/min/1.73m2 Final    GFR est non-AA 05/18/2018 >60  >60 ml/min/1.73m2 Final    Calcium 05/18/2018 8.6  8.5 - 10.1 MG/DL Final    Bilirubin, total 05/18/2018 0.2  0.2 - 1.0 MG/DL Final    ALT (SGPT) 05/18/2018 20  12 - 78 U/L Final    AST (SGOT) 05/18/2018 17  15 - 37 U/L Final    Alk. phosphatase 05/18/2018 95  45 - 117 U/L Final    Protein, total 05/18/2018 6.0* 6.4 - 8.2 g/dL Final    Albumin 05/18/2018 3.0* 3.5 - 5.0 g/dL Final    Globulin 05/18/2018 3.0  2.0 - 4.0 g/dL Final    A-G Ratio 05/18/2018 1.0* 1.1 - 2.2   Final    Glucose 05/18/2018 81  65 - 100 MG/DL Final    WBC 05/18/2018 3.2* 3.6 - 11.0 K/uL Final    RBC 05/18/2018 4.10  3.80 - 5.20 M/uL Final    HGB 05/18/2018 12.1  11.5 - 16.0 g/dL Final    HCT 05/18/2018 36.3  35.0 - 47.0 % Final    MCV 05/18/2018 88.5  80.0 - 99.0 FL Final    MCH 05/18/2018 29.5  26.0 - 34.0 PG Final    MCHC 05/18/2018 33.3  30.0 - 36.5 g/dL Final    RDW 05/18/2018 13.2  11.5 - 14.5 % Final    PLATELET 31/05/4351 610  150 - 400 K/uL Final    MPV 05/18/2018 9.0  8.9 - 12.9 FL Final    NRBC 05/18/2018 0.0  0  WBC Final    ABSOLUTE NRBC 05/18/2018 0.00  0.00 - 0.01 K/uL Final    TSH 05/18/2018 1.11  0.36 - 3.74 uIU/mL Final    LIPID PROFILE 05/18/2018        Final    Cholesterol, total 05/18/2018 132  <200 MG/DL Final    Triglyceride 05/18/2018 40  <150 MG/DL Final    HDL Cholesterol 05/18/2018 73  MG/DL Final    LDL, calculated 05/18/2018 51  0 - 100 MG/DL Final    VLDL, calculated 05/18/2018 8  MG/DL Final    CHOL/HDL Ratio 05/18/2018 1.8  0 - 5.0   Final    Valproic acid 05/22/2018 44* 50 - 100 ug/ml Final        RADIOLOGY REPORTS:    Results from Hospital Encounter encounter on 05/17/18   XR CHEST PA LAT   Narrative INDICATION:  cough     Exam: Chest 2 views. Comparison: None. Findings: Cardiomediastinal silhouette is normal. Pulmonary vasculature is not  engorged. No focal parenchymal opacities, effusions, or pneumothorax. Bony  thorax is intact. Impression Impression:  1. No acute cardiopulmonary disease      No results found.            MEDICATIONS       ALL MEDICATIONS  Current Facility-Administered Medications   Medication Dose Route Frequency    divalproex ER (DEPAKOTE ER) 24 hour tablet 500 mg  500 mg Oral QHS    haloperidol decanoate (HALDOL DECANOATE) 50 mg/mL injection 50 mg  50 mg IntraMUSCular Q28D    benzonatate (TESSALON) capsule 100 mg  100 mg Oral TID PRN    ibuprofen (MOTRIN) tablet 800 mg  800 mg Oral Q8H PRN    levothyroxine (SYNTHROID) tablet 25 mcg  25 mcg Oral ACB    chlorproMAZINE (THORAZINE) tablet 200 mg  200 mg Oral 7am    chlorproMAZINE (THORAZINE) tablet 400 mg  400 mg Oral QPM    albuterol (PROVENTIL VENTOLIN) nebulizer solution 2.5 mg  2.5 mg Nebulization Q4H PRN    OLANZapine (ZyPREXA) tablet 2.5 mg  2.5 mg Oral Q6H PRN    ziprasidone (GEODON) 10 mg in sterile water (preservative free) 0.5 mL injection  10 mg IntraMUSCular BID PRN    benztropine (COGENTIN) tablet 1 mg  1 mg Oral BID PRN    benztropine (COGENTIN) injection 1 mg  1 mg IntraMUSCular BID PRN    zolpidem (AMBIEN) tablet 5 mg  5 mg Oral QHS PRN    acetaminophen (TYLENOL) tablet 650 mg  650 mg Oral Q4H PRN    magnesium hydroxide (MILK OF MAGNESIA) 400 mg/5 mL oral suspension 30 mL  30 mL Oral DAILY PRN    nicotine (NICODERM CQ) 21 mg/24 hr patch 1 Patch  1 Patch TransDERmal DAILY PRN    senna-docusate (PERICOLACE) 8.6-50 mg per tablet 1 Tab  1 Tab Oral QHS    simvastatin (ZOCOR) tablet 20 mg  20 mg Oral QHS      SCHEDULED MEDICATIONS  Current Facility-Administered Medications   Medication Dose Route Frequency    divalproex ER (DEPAKOTE ER) 24 hour tablet 500 mg  500 mg Oral QHS    haloperidol decanoate (HALDOL DECANOATE) 50 mg/mL injection 50 mg  50 mg IntraMUSCular Q28D    levothyroxine (SYNTHROID) tablet 25 mcg  25 mcg Oral ACB    chlorproMAZINE (THORAZINE) tablet 200 mg  200 mg Oral 7am    chlorproMAZINE (THORAZINE) tablet 400 mg  400 mg Oral QPM    senna-docusate (PERICOLACE) 8.6-50 mg per tablet 1 Tab  1 Tab Oral QHS    simvastatin (ZOCOR) tablet 20 mg  20 mg Oral QHS                ASSESSMENT & PLAN        The patient, Maria G Sue, is a 75 y.o.  female who presents at this time for treatment of the following diagnoses:  Patient Active Hospital Problem List:   Bipolar disorder ([facility] [address] (5/17/2018)    Assessment: dario alternating with depression     Plan: mood stabilizer/ antipsychotic   5/19/18 continue same medications    5/20/18 continue same medication and check Depakote level tomorrow            I will continue to monitor blood levels (Depakote, Tegretol, lithium, clozapine---a drug with a narrow therapeutic index= NTI) and associated labs for drug therapy implemented that require intense monitoring for toxicity as deemed appropriate based on current medication side effects and pharmacodynamically determined drug 1/2 lives. A coordinated, multidisplinary treatment team (includes the nurse, unit pharmcist,  and writer) round was conducted for this initial evaluation with the patient present. The following regarding medications was addressed during rounds with patient: depakote   the risks and benefits of the proposed medication. The patient was given the opportunity to ask questions. Informed consent given to the use of the above medications. I will continue to adjust psychiatric and non-psychiatric medications (see above \"medication\" section and orders section for details) as deemed appropriate & based upon diagnoses and response to treatment. I have reviewed admission (and previous/old) labs and medical tests in the EHR and or transferring hospital documents. I will continue to order blood tests/labs and diagnostic tests as deemed appropriate and review results as they become available (see orders for details). I have reviewed old psychiatric and medical records available in the EHR.  I Will order additional psychiatric records from other institutions to further elucidate the nature of patient's psychopathology and review once available. I will gather additional collateral information from friends, family and o/p treatment team to further elucidate the nature of patient's psychopathology and baselline level of psychiatric functioning.       ESTIMATED LENGTH OF STAY:    3-5 days        STRENGTHS:  Access to housing/residential stability, Knowledge of medications and Motivated and ready for change                                        SIGNED:    Anette Lopez MD  5/27/2018

## 2018-05-28 NOTE — BH NOTES
Geodon 10 mg given. Pt is extremely, agitated, paranoid and screaming in the dayroom. Pt unable to accept verbal redirection and attempts distract and decrease stimuli. 1300  Pt remains agitated, yelling and spitting on the floor. 1400  Pt remains labile, some periods of quiet, while intermittently, yelling.

## 2018-05-28 NOTE — PROGRESS NOTES
Problem: Altered Thought Process (Adult/Pediatric)  Goal: *STG: Participates in treatment plan  Outcome: Not Progressing Towards Goal  Pt is cooperative and redirectable.   Remains Paranoid & delusional  AH and VH  Eats 100% dinner, fluids and snacks  Med/Meal compliant  Will continue to monitor

## 2018-05-29 PROCEDURE — 65220000003 HC RM SEMIPRIVATE PSYCH

## 2018-05-29 PROCEDURE — 74011250637 HC RX REV CODE- 250/637: Performed by: PSYCHIATRY & NEUROLOGY

## 2018-05-29 PROCEDURE — 74011250637 HC RX REV CODE- 250/637: Performed by: NURSE PRACTITIONER

## 2018-05-29 RX ADMIN — HALOPERIDOL 0.5 MG: 2 SOLUTION ORAL at 21:00

## 2018-05-29 RX ADMIN — LEVOTHYROXINE SODIUM 25 MCG: 25 TABLET ORAL at 06:34

## 2018-05-29 RX ADMIN — STANDARDIZED SENNA CONCENTRATE AND DOCUSATE SODIUM 1 TABLET: 8.6; 5 TABLET, FILM COATED ORAL at 21:00

## 2018-05-29 RX ADMIN — VALPROIC ACID 250 MG: 250 SOLUTION ORAL at 15:14

## 2018-05-29 RX ADMIN — CHLORPROMAZINE HYDROCHLORIDE 100 MG: 50 TABLET, SUGAR COATED ORAL at 06:34

## 2018-05-29 RX ADMIN — CHLORPROMAZINE HYDROCHLORIDE 200 MG: 50 TABLET, SUGAR COATED ORAL at 17:00

## 2018-05-29 RX ADMIN — VALPROIC ACID 250 MG: 250 SOLUTION ORAL at 08:37

## 2018-05-29 RX ADMIN — ZOLPIDEM TARTRATE 5 MG: 5 TABLET ORAL at 22:00

## 2018-05-29 RX ADMIN — HALOPERIDOL 0.5 MG: 2 SOLUTION ORAL at 08:37

## 2018-05-29 RX ADMIN — SIMVASTATIN 20 MG: 20 TABLET, FILM COATED ORAL at 21:00

## 2018-05-29 RX ADMIN — HALOPERIDOL 0.5 MG: 2 SOLUTION ORAL at 15:14

## 2018-05-29 RX ADMIN — VALPROIC ACID 250 MG: 250 SOLUTION ORAL at 21:00

## 2018-05-29 NOTE — BH NOTES
GROUP THERAPY PROGRESS NOTE    Cherry Vargas did not participate in a Process Group on the Geriatric Unit with a focus on shifting ones feelings to a positive note and preparing for the day through group singing.

## 2018-05-29 NOTE — PROGRESS NOTES
Problem: Altered Thought Process (Adult/Pediatric)  Goal: *STG: Participates in treatment plan  Outcome: Progressing Towards Goal  Patient hallucinating and responding to internal stimuli. Patient responds to redirection but curses at staff when redirected. Patient up ad kemal. Patient goal: did not state. Staff goal: to re-assure patient. Patient mood labile and at times tearful. Patient states that the voices are telling her mean things. Goal: *STG: Remains safe in hospital  Outcome: Progressing Towards Goal  Q 15 min safety rounds. Goal: *STG: Decreased hallucinations  Outcome: Not Progressing Towards Goal  Variance: Patient Condition  Comments: Responds to internal stimuli, holds head and cries, yelling out  Goal: Interventions  Outcome: Progressing Towards Goal  Medication management. Q 15 min safety rounds.  Group therapy

## 2018-05-29 NOTE — INTERDISCIPLINARY ROUNDS
Behavioral Health Interdisciplinary Rounds     Patient Name: Flores Victoria  Age: 75 y.o. Room/Bed:  742/01  Primary Diagnosis: Bipolar disorder ([facility] [address]   Admission Status: Involuntary Commitment     Readmission within 30 days: no  Power of  in place: no  Patient requires a blocked bed: yes          Reason for blocked bed: loud, disruptive, labile     VTE Prophylaxis: Not indicated    Mobility needs/Fall risk: yes    Nutritional Plan: no  Consults: no         Labs/Testing due today?: no    Sleep hours: 5.0       Participation in Care/Groups:  yes  Medication Compliant?: Yes  PRNS (last 24 hours): Antipsychotic (IM) and Sleep Aid, Tessalon     Restraints (last 24 hours):  no     CIWA (range last 24 hours):     COWS (range last 24 hours):      Alcohol screening (AUDIT) completed -   AUDIT Score: 0     If applicable, date SBIRT discussed in treatment team AND documented:     Tobacco - patient is a smoker: Have You Used Tobacco in the Past 30 Days: No  Illegal Drugs use: Have You Used Any Illegal Substances Over the Past 12 Months: No    24 hour chart check complete: yes     Patient goal(s) for today:   Treatment team focus/goals: Plan to titrate her medications   Progress note She remains loose and labile. LOS:  12  Expected LOS: TBD    Financial concerns/prescription coverage:    Date of last family contact:       Family requesting physician contact today:    Discharge plan: She will return home to her nieces home when ready or discharge.   Guns in the home:  no       Outpatient provider(s): Landen GRISSOM     Participating treatment team members: Stan Padilla -

## 2018-05-29 NOTE — BH NOTES
Psychiatric progress note            IDENTIFICATION:    Patient Name  Nneka Sales   Date of Birth 1945   [facility] [id number]   Medical Record Number  [id number]      Age  75 y.o. PCP Daren Orlando MD   Admit date:  5/17/2018    Room Number  742/01  @ [facility]   Date of Service  5/29/2018            HISTORY         REASON FOR HOSPITALIZATION:  CC: \"agitation, delusions and dario\". Pt admitted under a temporary halfway order (TDO)  For severe psychosis and dario proving to be an imminent danger to self and others and an inability to care for self. HISTORY OF PRESENT ILLNESS:    The patient, Nneka Sales, is a 75 y.o. BLACK OR  female with a past psychiatric history significant for bipolar d/o , who presents at this time with complaints of (and/or evidence of) the following emotional symptoms: agitation, delusions and psychotic behavior. Additional symptomatology include agitation. The above symptoms have been present for 3 days . These symptoms are of severe severity. These symptoms are constant  in nature. The patient's condition has been precipitated by medication adjustments and psychosocial stressors (family conflicts  ). Patient's condition made worse by treatment noncompliance. UDS: MJ negative; BAL=0.     5/19/18 she is doing better and mood is ann marie and no anger issues and no self harm behavior sne she still has mood instability and gets angry and agitated sometimes  5/20/18 she is hyperverbal and agitated and she gets manic and rambling and she has flight of ideas , compliant with medications and no SI or I    5/21/18- Yesterday had some agitation and poor directability. Mkes obscene comments under her breath. Can also be charming, pleasant, and quite cooperative. 5/22/18- Med and meal compliant. Redirectable. Visible in milieu. 5/23/18-Very p;easant with maggie correa of humor. Social with peers. Has poor insight that she uses profanity and may offend others. But she is not doing this to be provocative. 5/24/18- Pleasant and cooperative. Med meal and group compliant. No side effects  5/25/18- Well mannered and very pleasant. Occasionally pressured and loud non offensive speech. Mood is good and is redirectable. 5/26/18  Mrs. Perico Riggs remains labile. Resting comfortably this morning.  5/2718- Very manic, loud, disorganized and intrusive but redirectable  5/28/18 she is really manic , disorganized , screaming and yelling and not sleeping well , receive many prn medications and sounds her high dosage of thorazine is not working , she still paranoid and she spitting on the floor and licking her spit ,  0/31/78 she is manic and she is tolerating medications and not showing EPS and she slept fairly last night and less aggressive and no hallucination but has flight of ideas      ALLERGIES: No Known Allergies   MEDICATIONS PRIOR TO ADMISSION:   Prescriptions Prior to Admission   Medication Sig    chlorproMAZINE (THORAZINE) 200 mg tablet Take 200 mg by mouth daily. With 400mg QPM    chlorproMAZINE (THORAZINE) 200 mg tablet Take 400 mg by mouth every evening.  haloperidol decanoate (HALDOL DECANOATE) 50 mg/mL injection 37.5 mg by IntraMUSCular route every four (4) weeks. Indications: Schizophrenia    divalproex DR (DEPAKOTE) 250 mg tablet Take 250 mg by mouth nightly.  ibuprofen (MOTRIN) 800 mg tablet Take 800 mg by mouth every eight (8) hours as needed for Pain.  simvastatin (ZOCOR) 20 mg tablet Take 20 mg by mouth nightly.  levothyroxine (SYNTHROID) 25 mcg tablet Take 25 mcg by mouth Daily (before breakfast).  melatonin tab tablet Take 10 mg by mouth nightly.  losartan (COZAAR) 50 mg tablet Take 50 mg by mouth daily.  albuterol (PROVENTIL HFA, VENTOLIN HFA, PROAIR HFA) 90 mcg/actuation inhaler Take 1-2 Puffs by inhalation every four (4) hours as needed for Wheezing or Shortness of Breath.     senna-docusate (PERICOLACE) 8.6-50 mg per tablet Take 2 Tabs by mouth two (2) times a day.  amLODIPine (NORVASC) 10 mg tablet Take 10 mg by mouth daily. PAST MEDICAL HISTORY:   Past Medical History:   Diagnosis Date    Asthma     Bipolar 1 disorder ([address]    No past surgical history on file. SOCIAL HISTORY:    Social History     Social History    Marital status:      Spouse name: N/A    Number of children: N/A    Years of education: N/A     Occupational History    Not on file. Social History Main Topics    Smoking status: Never Smoker    Smokeless tobacco: Not on file    Alcohol use No    Drug use: No    Sexual activity: Not Currently     Other Topics Concern    Not on file     Social History Narrative    75year old AA female admitted on TDO for agitation, making threats and having a fixed delusion that she is . She has been making racial remarks and placing herself in danger. Pt has hx of treatment with Depakote, Thorazine and Haldol Dec.      FAMILY HISTORY:    No family history on file. REVIEW OF SYSTEMS:   Psychological ROS: positive for - irritability  Respiratory ROS: no cough, shortness of breath, or wheezing  Cardiovascular ROS: no chest pain or dyspnea on exertion  Pertinent items are noted in the History of Present Illness. All other Systems reviewed and are considered negative. MENTAL STATUS EXAM & VITALS     MENTAL STATUS EXAM (MSE):    MSE FINDINGS ARE WITHIN NORMAL LIMITS (WNL) UNLESS OTHERWISE STATED BELOW. ( ALL OF THE BELOW CATEGORIES OF THE MSE HAVE BEEN REVIEWED (reviewed 5/29/2018) AND UPDATED AS DEEMED APPROPRIATE )  General Presentation age appropriate, cooperative   Orientation oriented to time, place and person   Vital Signs  See below (reviewed 5/29/2018); Vital Signs (BP, Pulse, & Temp) are within normal limits if not listed below.    Gait and Station Stable/steady, no ataxia   Musculoskeletal System No extrapyramidal symptoms (EPS); no abnormal muscular movements or Tardive Dyskinesia (TD); muscle strength and tone are within normal limits   Language No aphasia or dysarthria   Speech:  normal pitch and normal volume   Thought Processes concrete; normal rate of thoughts; poor abstract reasoning/computation   Thought Associations goal directed   Thought Content Did not show delusion today    Suicidal Ideations none   Homicidal Ideations none   Mood:  irritable   Affect:  mood-congruent   Memory recent  fair   Memory remote:  fair   Concentration/Attention:  distractable   Fund of Knowledge average   Insight:  limited   Reliability fair   Judgment:  limited          VITALS:     Patient Vitals for the past 24 hrs:   Temp Pulse Resp BP SpO2   05/29/18 0723 97.6 °F (36.4 °C) 100 18 (!) 171/103 100 %   05/28/18 2122 97.4 °F (36.3 °C) 88 18 145/83 97 %   05/28/18 1559 97 °F (36.1 °C) 78 16 113/58 98 %     Wt Readings from Last 3 Encounters:   05/27/18 76.2 kg (168 lb)   01/01/15 83.9 kg (185 lb)     Temp Readings from Last 3 Encounters:   05/29/18 97.6 °F (36.4 °C)   01/01/15 97.8 °F (36.6 °C)   12/27/13 97 °F (36.1 °C)     BP Readings from Last 3 Encounters:   05/29/18 (!) 171/103   01/01/15 103/65   12/27/13 118/83     Pulse Readings from Last 3 Encounters:   05/29/18 100   01/01/15 87   12/27/13 96            DATA     LABORATORY DATA:  Labs Reviewed   METABOLIC PANEL, COMPREHENSIVE - Abnormal; Notable for the following:        Result Value    BUN/Creatinine ratio 21 (*)     Protein, total 6.0 (*)     Albumin 3.0 (*)     A-G Ratio 1.0 (*)     All other components within normal limits   CBC W/O DIFF - Abnormal; Notable for the following:     WBC 3.2 (*)     All other components within normal limits   VALPROIC ACID - Abnormal; Notable for the following:     Valproic acid 44 (*)     All other components within normal limits   GLUCOSE, FASTING   TSH 3RD GENERATION   LIPID PANEL     Admission on 05/17/2018   Component Date Value Ref Range Status    Sodium 05/18/2018 137  136 - 145 mmol/L Final    Potassium 05/18/2018 3.8  3.5 - 5.1 mmol/L Final    Chloride 05/18/2018 102  97 - 108 mmol/L Final    CO2 05/18/2018 28  21 - 32 mmol/L Final    Anion gap 05/18/2018 7  5 - 15 mmol/L Final    Glucose 05/18/2018 81  65 - 100 mg/dL Final    BUN 05/18/2018 12  6 - 20 MG/DL Final    Creatinine 05/18/2018 0.56  0.55 - 1.02 MG/DL Final    BUN/Creatinine ratio 05/18/2018 21* 12 - 20   Final    GFR est AA 05/18/2018 >60  >60 ml/min/1.73m2 Final    GFR est non-AA 05/18/2018 >60  >60 ml/min/1.73m2 Final    Calcium 05/18/2018 8.6  8.5 - 10.1 MG/DL Final    Bilirubin, total 05/18/2018 0.2  0.2 - 1.0 MG/DL Final    ALT (SGPT) 05/18/2018 20  12 - 78 U/L Final    AST (SGOT) 05/18/2018 17  15 - 37 U/L Final    Alk.  phosphatase 05/18/2018 95  45 - 117 U/L Final    Protein, total 05/18/2018 6.0* 6.4 - 8.2 g/dL Final    Albumin 05/18/2018 3.0* 3.5 - 5.0 g/dL Final    Globulin 05/18/2018 3.0  2.0 - 4.0 g/dL Final    A-G Ratio 05/18/2018 1.0* 1.1 - 2.2   Final    Glucose 05/18/2018 81  65 - 100 MG/DL Final    WBC 05/18/2018 3.2* 3.6 - 11.0 K/uL Final    RBC 05/18/2018 4.10  3.80 - 5.20 M/uL Final    HGB 05/18/2018 12.1  11.5 - 16.0 g/dL Final    HCT 05/18/2018 36.3  35.0 - 47.0 % Final    MCV 05/18/2018 88.5  80.0 - 99.0 FL Final    MCH 05/18/2018 29.5  26.0 - 34.0 PG Final    MCHC 05/18/2018 33.3  30.0 - 36.5 g/dL Final    RDW 05/18/2018 13.2  11.5 - 14.5 % Final    PLATELET 19/76/3861 875  150 - 400 K/uL Final    MPV 05/18/2018 9.0  8.9 - 12.9 FL Final    NRBC 05/18/2018 0.0  0  WBC Final    ABSOLUTE NRBC 05/18/2018 0.00  0.00 - 0.01 K/uL Final    TSH 05/18/2018 1.11  0.36 - 3.74 uIU/mL Final    LIPID PROFILE 05/18/2018        Final    Cholesterol, total 05/18/2018 132  <200 MG/DL Final    Triglyceride 05/18/2018 40  <150 MG/DL Final    HDL Cholesterol 05/18/2018 73  MG/DL Final    LDL, calculated 05/18/2018 51  0 - 100 MG/DL Final    VLDL, calculated 05/18/2018 8  MG/DL Final    CHOL/HDL Ratio 05/18/2018 1.8 0 - 5.0   Final    Valproic acid 05/22/2018 44* 50 - 100 ug/ml Final        RADIOLOGY REPORTS:    Results from Hospital Encounter encounter on 05/17/18   XR CHEST PA LAT   Narrative INDICATION:  cough     Exam: Chest 2 views. Comparison: None. Findings: Cardiomediastinal silhouette is normal. Pulmonary vasculature is not  engorged. No focal parenchymal opacities, effusions, or pneumothorax. Bony  thorax is intact. Impression Impression:  1. No acute cardiopulmonary disease      No results found.            MEDICATIONS       ALL MEDICATIONS  Current Facility-Administered Medications   Medication Dose Route Frequency    haloperidol (HALDOL) 2 mg/mL oral solution 0.5 mg  0.5 mg Oral TID    chlorproMAZINE (THORAZINE) tablet 200 mg  200 mg Oral QPM    chlorproMAZINE (THORAZINE) tablet 100 mg  100 mg Oral 7am    valproic acid (as sodium salt) (DEPAKENE) 250 mg/5 mL (5 mL) oral solution 250 mg  250 mg Oral TID    haloperidol decanoate (HALDOL DECANOATE) 50 mg/mL injection 50 mg  50 mg IntraMUSCular Q28D    benzonatate (TESSALON) capsule 100 mg  100 mg Oral TID PRN    ibuprofen (MOTRIN) tablet 800 mg  800 mg Oral Q8H PRN    levothyroxine (SYNTHROID) tablet 25 mcg  25 mcg Oral ACB    albuterol (PROVENTIL VENTOLIN) nebulizer solution 2.5 mg  2.5 mg Nebulization Q4H PRN    ziprasidone (GEODON) 10 mg in sterile water (preservative free) 0.5 mL injection  10 mg IntraMUSCular BID PRN    benztropine (COGENTIN) tablet 1 mg  1 mg Oral BID PRN    benztropine (COGENTIN) injection 1 mg  1 mg IntraMUSCular BID PRN    zolpidem (AMBIEN) tablet 5 mg  5 mg Oral QHS PRN    acetaminophen (TYLENOL) tablet 650 mg  650 mg Oral Q4H PRN    magnesium hydroxide (MILK OF MAGNESIA) 400 mg/5 mL oral suspension 30 mL  30 mL Oral DAILY PRN    nicotine (NICODERM CQ) 21 mg/24 hr patch 1 Patch  1 Patch TransDERmal DAILY PRN    senna-docusate (PERICOLACE) 8.6-50 mg per tablet 1 Tab  1 Tab Oral QHS    simvastatin (ZOCOR) tablet 20 mg  20 mg Oral QHS      SCHEDULED MEDICATIONS  Current Facility-Administered Medications   Medication Dose Route Frequency    haloperidol (HALDOL) 2 mg/mL oral solution 0.5 mg  0.5 mg Oral TID    chlorproMAZINE (THORAZINE) tablet 200 mg  200 mg Oral QPM    chlorproMAZINE (THORAZINE) tablet 100 mg  100 mg Oral 7am    valproic acid (as sodium salt) (DEPAKENE) 250 mg/5 mL (5 mL) oral solution 250 mg  250 mg Oral TID    haloperidol decanoate (HALDOL DECANOATE) 50 mg/mL injection 50 mg  50 mg IntraMUSCular Q28D    levothyroxine (SYNTHROID) tablet 25 mcg  25 mcg Oral ACB    senna-docusate (PERICOLACE) 8.6-50 mg per tablet 1 Tab  1 Tab Oral QHS    simvastatin (ZOCOR) tablet 20 mg  20 mg Oral QHS                ASSESSMENT & PLAN        The patient, Cherry Vargas, is a 75 y.o.  female who presents at this time for treatment of the following diagnoses:  Patient Active Hospital Problem List:   Bipolar disorder ([facility] (5/17/2018)    Assessment: dario alternating with depression     Plan: mood stabilizer/ antipsychotic   5/19/18 continue same medications    5/20/18 continue same medication and check Depakote level tomorrow    5/28/18 will increase her depakene to 250 mg TID , Add Haldol 0.5 mg TID as she is already on Haldol Decanoate but still disorganized and paranoid , will go down on Thorazine and adjust the dosage of Haldol and depakene according to her response , will watch for EPS   5/29/18 will continue decrease Thorazine           I will continue to monitor blood levels (Depakote, Tegretol, lithium, clozapine---a drug with a narrow therapeutic index= NTI) and associated labs for drug therapy implemented that require intense monitoring for toxicity as deemed appropriate based on current medication side effects and pharmacodynamically determined drug 1/2 lives.          A coordinated, multidisplinary treatment team (includes the nurse, unit pharmcist,  and writer) round was conducted for this initial evaluation with the patient present. The following regarding medications was addressed during rounds with patient: depakote   the risks and benefits of the proposed medication. The patient was given the opportunity to ask questions. Informed consent given to the use of the above medications. I will continue to adjust psychiatric and non-psychiatric medications (see above \"medication\" section and orders section for details) as deemed appropriate & based upon diagnoses and response to treatment. I have reviewed admission (and previous/old) labs and medical tests in the EHR and or transferring hospital documents. I will continue to order blood tests/labs and diagnostic tests as deemed appropriate and review results as they become available (see orders for details). I have reviewed old psychiatric and medical records available in the EHR. I Will order additional psychiatric records from other institutions to further elucidate the nature of patient's psychopathology and review once available. I will gather additional collateral information from friends, family and o/p treatment team to further elucidate the nature of patient's psychopathology and baselline level of psychiatric functioning.       ESTIMATED LENGTH OF STAY:    3-5 days        STRENGTHS:  Access to housing/residential stability, Knowledge of medications and Motivated and ready for change                                        SIGNED:    Akanksha Chance MD  5/29/2018

## 2018-05-29 NOTE — PROGRESS NOTES
Problem: Altered Thought Process (Adult/Pediatric)  Goal: *STG: Remains safe in hospital  Outcome: Progressing Towards Goal  Patient is sitting in her room. Responding to internal stimuli. No distress noted. Will continue to monitor.

## 2018-05-29 NOTE — PROGRESS NOTES
Problem: Altered Thought Process (Adult/Pediatric)  Goal: *STG: Complies with medication therapy  Outcome: Progressing Towards Goal  Patient is sitting quietly in her room. Alert and verbal.  No distress noted. Will continue to monitor.

## 2018-05-30 PROCEDURE — 65220000003 HC RM SEMIPRIVATE PSYCH

## 2018-05-30 PROCEDURE — 74011250637 HC RX REV CODE- 250/637: Performed by: PSYCHIATRY & NEUROLOGY

## 2018-05-30 PROCEDURE — 74011250637 HC RX REV CODE- 250/637: Performed by: NURSE PRACTITIONER

## 2018-05-30 RX ORDER — HALOPERIDOL 2 MG/ML
1 SOLUTION ORAL 3 TIMES DAILY
Status: DISCONTINUED | OUTPATIENT
Start: 2018-05-30 | End: 2018-06-02

## 2018-05-30 RX ADMIN — HALOPERIDOL 1 MG: 2 SOLUTION ORAL at 20:21

## 2018-05-30 RX ADMIN — SIMVASTATIN 20 MG: 20 TABLET, FILM COATED ORAL at 20:17

## 2018-05-30 RX ADMIN — VALPROIC ACID 250 MG: 250 SOLUTION ORAL at 20:19

## 2018-05-30 RX ADMIN — HALOPERIDOL 1 MG: 2 SOLUTION ORAL at 14:25

## 2018-05-30 RX ADMIN — HALOPERIDOL 0.5 MG: 2 SOLUTION ORAL at 08:18

## 2018-05-30 RX ADMIN — VALPROIC ACID 250 MG: 250 SOLUTION ORAL at 08:18

## 2018-05-30 RX ADMIN — STANDARDIZED SENNA CONCENTRATE AND DOCUSATE SODIUM 1 TABLET: 8.6; 5 TABLET, FILM COATED ORAL at 20:18

## 2018-05-30 RX ADMIN — VALPROIC ACID 250 MG: 250 SOLUTION ORAL at 14:23

## 2018-05-30 RX ADMIN — LEVOTHYROXINE SODIUM 25 MCG: 25 TABLET ORAL at 07:03

## 2018-05-30 NOTE — BH NOTES
GROUP THERAPY PROGRESS NOTE    Chin Molina did not participate in a Process Group on the Geriatric Unit with a focus on shifting ones feelings to a positive note and preparing for the day through group singing.

## 2018-05-30 NOTE — INTERDISCIPLINARY ROUNDS
Behavioral Health Interdisciplinary Rounds     Patient Name: Angie Carbajal  Age: 75 y.o. Room/Bed:  742/01  Primary Diagnosis: Bipolar disorder ([address]   Admission Status: Involuntary Commitment     Readmission within 30 days: no  Power of  in place: no  Patient requires a blocked bed: yes          Reason for blocked bed: loud, disruptive     VTE Prophylaxis: Not indicated    Mobility needs/Fall risk: yes    Nutritional Plan: no  Consults: no         Labs/Testing due today?: no    Sleep hours: 1.75       Participation in Care/Groups:  yes  Medication Compliant?: Yes  PRNS (last 24 hours): Sleep Aid    Restraints (last 24 hours):  no     CIWA (range last 24 hours):     COWS (range last 24 hours):      Alcohol screening (AUDIT) completed -   AUDIT Score: 0     If applicable, date SBIRT discussed in treatment team AND documented:     Tobacco - patient is a smoker: Have You Used Tobacco in the Past 30 Days: No  Illegal Drugs use: Have You Used Any Illegal Substances Over the Past 12 Months: No    24 hour chart check complete: yes     Patient goal(s) for today:   Treatment team focus/goals:Plan to titrate her medications   Progress note - she has been compliant with her medications and treatment.       LOS:  13  Expected LOS: TBD     Participating treatment team members: Kathy Zuniga Dr. [facility]

## 2018-05-30 NOTE — PROGRESS NOTES
Problem: Falls - Risk of  Goal: *Absence of Falls  Document Jovana Fall Risk and appropriate interventions in the flowsheet. Outcome: Progressing Towards Goal  Fall Risk Interventions:  Mobility Interventions: Bed/chair exit alarm, Patient to call before getting OOB    Mentation Interventions: Adequate sleep, hydration, pain control, Bed/chair exit alarm, Door open when patient unattended    Medication Interventions: Patient to call before getting OOB, Teach patient to arise slowly    2330 Pt appears asleep in bed. Respirations even and unlabored. Night light on, call bell within reach. Will monitor with Q 15 safety checks.

## 2018-05-30 NOTE — BH NOTES
Psychiatric progress note            IDENTIFICATION:    Patient Name  Genevieve Mendoza   Date of Birth 1945   [facility] [id number]   Medical Record Number  [id number]      Age  75 y.o. PCP Dean Aguila MD   Admit date:  5/17/2018    Room Number  742/01  @ [facility]   Date of Service  5/30/2018            HISTORY         REASON FOR HOSPITALIZATION:  CC: \"agitation, delusions and dario\". Pt admitted under a temporary halfway order (TDO)  For severe psychosis and dario proving to be an imminent danger to self and others and an inability to care for self. HISTORY OF PRESENT ILLNESS:    The patient, Genevieve Mendoza, is a 75 y.o. BLACK OR  female with a past psychiatric history significant for bipolar d/o , who presents at this time with complaints of (and/or evidence of) the following emotional symptoms: agitation, delusions and psychotic behavior. Additional symptomatology include agitation. The above symptoms have been present for 3 days . These symptoms are of severe severity. These symptoms are constant  in nature. The patient's condition has been precipitated by medication adjustments and psychosocial stressors (family conflicts  ). Patient's condition made worse by treatment noncompliance. UDS: MJ negative; BAL=0.     5/19/18 she is doing better and mood is ann marie and no anger issues and no self harm behavior sne she still has mood instability and gets angry and agitated sometimes  5/20/18 she is hyperverbal and agitated and she gets manic and rambling and she has flight of ideas , compliant with medications and no SI or I    5/21/18- Yesterday had some agitation and poor directability. Mkes obscene comments under her breath. Can also be charming, pleasant, and quite cooperative. 5/22/18- Med and meal compliant. Redirectable. Visible in milieu. 5/23/18-Very p;easant with maggie correa of humor. Social with peers. Has poor insight that she uses profanity and may offend others. But she is not doing this to be provocative. 5/24/18- Pleasant and cooperative. Med meal and group compliant. No side effects  5/25/18- Well mannered and very pleasant. Occasionally pressured and loud non offensive speech. Mood is good and is redirectable. 5/26/18  Mrs. Jesusa Prader remains labile. Resting comfortably this morning.  5/2718- Very manic, loud, disorganized and intrusive but redirectable  5/28/18 she is really manic , disorganized , screaming and yelling and not sleeping well , receive many prn medications and sounds her high dosage of thorazine is not working , she still paranoid and she spitting on the floor and licking her spit ,  0/74/58 she is manic and she is tolerating medications and not showing EPS and she slept fairly last night and less aggressive and no hallucination but has flight of ideas  5/30/18 she still manic and she is having racing thoughts and incoherent speech and she did not sleep well last night and did not respond to thorazine , she has no aggressive behavior and she is not showing SI or HI , still paranoid       ALLERGIES: No Known Allergies   MEDICATIONS PRIOR TO ADMISSION:   Prescriptions Prior to Admission   Medication Sig    chlorproMAZINE (THORAZINE) 200 mg tablet Take 200 mg by mouth daily. With 400mg QPM    chlorproMAZINE (THORAZINE) 200 mg tablet Take 400 mg by mouth every evening.  haloperidol decanoate (HALDOL DECANOATE) 50 mg/mL injection 37.5 mg by IntraMUSCular route every four (4) weeks. Indications: Schizophrenia    divalproex DR (DEPAKOTE) 250 mg tablet Take 250 mg by mouth nightly.  ibuprofen (MOTRIN) 800 mg tablet Take 800 mg by mouth every eight (8) hours as needed for Pain.  simvastatin (ZOCOR) 20 mg tablet Take 20 mg by mouth nightly.  levothyroxine (SYNTHROID) 25 mcg tablet Take 25 mcg by mouth Daily (before breakfast).  melatonin tab tablet Take 10 mg by mouth nightly.  losartan (COZAAR) 50 mg tablet Take 50 mg by mouth daily.     albuterol (PROVENTIL HFA, VENTOLIN HFA, PROAIR HFA) 90 mcg/actuation inhaler Take 1-2 Puffs by inhalation every four (4) hours as needed for Wheezing or Shortness of Breath.  senna-docusate (PERICOLACE) 8.6-50 mg per tablet Take 2 Tabs by mouth two (2) times a day.  amLODIPine (NORVASC) 10 mg tablet Take 10 mg by mouth daily. PAST MEDICAL HISTORY:   Past Medical History:   Diagnosis Date    Asthma     Bipolar 1 disorder ([address]    No past surgical history on file. SOCIAL HISTORY:    Social History     Social History    Marital status:      Spouse name: N/A    Number of children: N/A    Years of education: N/A     Occupational History    Not on file. Social History Main Topics    Smoking status: Never Smoker    Smokeless tobacco: Not on file    Alcohol use No    Drug use: No    Sexual activity: Not Currently     Other Topics Concern    Not on file     Social History Narrative    75year old AA female admitted on TDO for agitation, making threats and having a fixed delusion that she is . She has been making racial remarks and placing herself in danger. Pt has hx of treatment with Depakote, Thorazine and Haldol Dec.      FAMILY HISTORY:    No family history on file. REVIEW OF SYSTEMS:   Psychological ROS: positive for - irritability  Respiratory ROS: no cough, shortness of breath, or wheezing  Cardiovascular ROS: no chest pain or dyspnea on exertion  Pertinent items are noted in the History of Present Illness. All other Systems reviewed and are considered negative. MENTAL STATUS EXAM & VITALS     MENTAL STATUS EXAM (MSE):    MSE FINDINGS ARE WITHIN NORMAL LIMITS (WNL) UNLESS OTHERWISE STATED BELOW. ( ALL OF THE BELOW CATEGORIES OF THE MSE HAVE BEEN REVIEWED (reviewed 5/30/2018) AND UPDATED AS DEEMED APPROPRIATE )  General Presentation age appropriate, cooperative   Orientation oriented to time, place and person   Vital Signs  See below (reviewed 5/30/2018);  Vital Signs (BP, Pulse, & Temp) are within normal limits if not listed below.    Gait and Station Stable/steady, no ataxia   Musculoskeletal System No extrapyramidal symptoms (EPS); no abnormal muscular movements or Tardive Dyskinesia (TD); muscle strength and tone are within normal limits   Language No aphasia or dysarthria   Speech:  normal pitch and normal volume   Thought Processes concrete; normal rate of thoughts; poor abstract reasoning/computation   Thought Associations goal directed   Thought Content Did not show delusion today    Suicidal Ideations none   Homicidal Ideations none   Mood:  irritable   Affect:  mood-congruent   Memory recent  fair   Memory remote:  fair   Concentration/Attention:  distractable   Fund of Knowledge average   Insight:  limited   Reliability fair   Judgment:  limited          VITALS:     Patient Vitals for the past 24 hrs:   Temp Pulse Resp BP SpO2   05/30/18 1332 97.8 °F (36.6 °C) 80 18 (!) 130/99 -   05/30/18 0727 98.1 °F (36.7 °C) (!) 109 18 (!) 181/107 98 %   05/29/18 1954 97.5 °F (36.4 °C) (!) 104 20 163/82 100 %   05/29/18 1546 98.2 °F (36.8 °C) 100 16 139/89 98 %     Wt Readings from Last 3 Encounters:   05/27/18 76.2 kg (168 lb)   01/01/15 83.9 kg (185 lb)     Temp Readings from Last 3 Encounters:   05/30/18 97.8 °F (36.6 °C)   01/01/15 97.8 °F (36.6 °C)   12/27/13 97 °F (36.1 °C)     BP Readings from Last 3 Encounters:   05/30/18 (!) 130/99   01/01/15 103/65   12/27/13 118/83     Pulse Readings from Last 3 Encounters:   05/30/18 80   01/01/15 87   12/27/13 96            DATA     LABORATORY DATA:  Labs Reviewed   METABOLIC PANEL, COMPREHENSIVE - Abnormal; Notable for the following:        Result Value    BUN/Creatinine ratio 21 (*)     Protein, total 6.0 (*)     Albumin 3.0 (*)     A-G Ratio 1.0 (*)     All other components within normal limits   CBC W/O DIFF - Abnormal; Notable for the following:     WBC 3.2 (*)     All other components within normal limits   VALPROIC ACID - Abnormal; Notable for the following:     Valproic acid 44 (*)     All other components within normal limits   GLUCOSE, FASTING   TSH 3RD GENERATION   LIPID PANEL     Admission on 05/17/2018   Component Date Value Ref Range Status    Sodium 05/18/2018 137  136 - 145 mmol/L Final    Potassium 05/18/2018 3.8  3.5 - 5.1 mmol/L Final    Chloride 05/18/2018 102  97 - 108 mmol/L Final    CO2 05/18/2018 28  21 - 32 mmol/L Final    Anion gap 05/18/2018 7  5 - 15 mmol/L Final    Glucose 05/18/2018 81  65 - 100 mg/dL Final    BUN 05/18/2018 12  6 - 20 MG/DL Final    Creatinine 05/18/2018 0.56  0.55 - 1.02 MG/DL Final    BUN/Creatinine ratio 05/18/2018 21* 12 - 20   Final    GFR est AA 05/18/2018 >60  >60 ml/min/1.73m2 Final    GFR est non-AA 05/18/2018 >60  >60 ml/min/1.73m2 Final    Calcium 05/18/2018 8.6  8.5 - 10.1 MG/DL Final    Bilirubin, total 05/18/2018 0.2  0.2 - 1.0 MG/DL Final    ALT (SGPT) 05/18/2018 20  12 - 78 U/L Final    AST (SGOT) 05/18/2018 17  15 - 37 U/L Final    Alk.  phosphatase 05/18/2018 95  45 - 117 U/L Final    Protein, total 05/18/2018 6.0* 6.4 - 8.2 g/dL Final    Albumin 05/18/2018 3.0* 3.5 - 5.0 g/dL Final    Globulin 05/18/2018 3.0  2.0 - 4.0 g/dL Final    A-G Ratio 05/18/2018 1.0* 1.1 - 2.2   Final    Glucose 05/18/2018 81  65 - 100 MG/DL Final    WBC 05/18/2018 3.2* 3.6 - 11.0 K/uL Final    RBC 05/18/2018 4.10  3.80 - 5.20 M/uL Final    HGB 05/18/2018 12.1  11.5 - 16.0 g/dL Final    HCT 05/18/2018 36.3  35.0 - 47.0 % Final    MCV 05/18/2018 88.5  80.0 - 99.0 FL Final    MCH 05/18/2018 29.5  26.0 - 34.0 PG Final    MCHC 05/18/2018 33.3  30.0 - 36.5 g/dL Final    RDW 05/18/2018 13.2  11.5 - 14.5 % Final    PLATELET 28/02/7873 374  150 - 400 K/uL Final    MPV 05/18/2018 9.0  8.9 - 12.9 FL Final    NRBC 05/18/2018 0.0  0  WBC Final    ABSOLUTE NRBC 05/18/2018 0.00  0.00 - 0.01 K/uL Final    TSH 05/18/2018 1.11  0.36 - 3.74 uIU/mL Final    LIPID PROFILE 05/18/2018        Final    Cholesterol, total 05/18/2018 132  <200 MG/DL Final    Triglyceride 05/18/2018 40  <150 MG/DL Final    HDL Cholesterol 05/18/2018 73  MG/DL Final    LDL, calculated 05/18/2018 51  0 - 100 MG/DL Final    VLDL, calculated 05/18/2018 8  MG/DL Final    CHOL/HDL Ratio 05/18/2018 1.8  0 - 5.0   Final    Valproic acid 05/22/2018 44* 50 - 100 ug/ml Final        RADIOLOGY REPORTS:    Results from Hospital Encounter encounter on 05/17/18   XR CHEST PA LAT   Narrative INDICATION:  cough     Exam: Chest 2 views. Comparison: None. Findings: Cardiomediastinal silhouette is normal. Pulmonary vasculature is not  engorged. No focal parenchymal opacities, effusions, or pneumothorax. Bony  thorax is intact. Impression Impression:  1. No acute cardiopulmonary disease      No results found.            MEDICATIONS       ALL MEDICATIONS  Current Facility-Administered Medications   Medication Dose Route Frequency    haloperidol (HALDOL) 2 mg/mL oral solution 1 mg  1 mg Oral TID    valproic acid (as sodium salt) (DEPAKENE) 250 mg/5 mL (5 mL) oral solution 250 mg  250 mg Oral TID    haloperidol decanoate (HALDOL DECANOATE) 50 mg/mL injection 50 mg  50 mg IntraMUSCular Q28D    benzonatate (TESSALON) capsule 100 mg  100 mg Oral TID PRN    ibuprofen (MOTRIN) tablet 800 mg  800 mg Oral Q8H PRN    levothyroxine (SYNTHROID) tablet 25 mcg  25 mcg Oral ACB    albuterol (PROVENTIL VENTOLIN) nebulizer solution 2.5 mg  2.5 mg Nebulization Q4H PRN    ziprasidone (GEODON) 10 mg in sterile water (preservative free) 0.5 mL injection  10 mg IntraMUSCular BID PRN    benztropine (COGENTIN) tablet 1 mg  1 mg Oral BID PRN    benztropine (COGENTIN) injection 1 mg  1 mg IntraMUSCular BID PRN    zolpidem (AMBIEN) tablet 5 mg  5 mg Oral QHS PRN    acetaminophen (TYLENOL) tablet 650 mg  650 mg Oral Q4H PRN    magnesium hydroxide (MILK OF MAGNESIA) 400 mg/5 mL oral suspension 30 mL  30 mL Oral DAILY PRN  nicotine (NICODERM CQ) 21 mg/24 hr patch 1 Patch  1 Patch TransDERmal DAILY PRN    senna-docusate (PERICOLACE) 8.6-50 mg per tablet 1 Tab  1 Tab Oral QHS    simvastatin (ZOCOR) tablet 20 mg  20 mg Oral QHS      SCHEDULED MEDICATIONS  Current Facility-Administered Medications   Medication Dose Route Frequency    haloperidol (HALDOL) 2 mg/mL oral solution 1 mg  1 mg Oral TID    valproic acid (as sodium salt) (DEPAKENE) 250 mg/5 mL (5 mL) oral solution 250 mg  250 mg Oral TID    haloperidol decanoate (HALDOL DECANOATE) 50 mg/mL injection 50 mg  50 mg IntraMUSCular Q28D    levothyroxine (SYNTHROID) tablet 25 mcg  25 mcg Oral ACB    senna-docusate (PERICOLACE) 8.6-50 mg per tablet 1 Tab  1 Tab Oral QHS    simvastatin (ZOCOR) tablet 20 mg  20 mg Oral QHS                ASSESSMENT & PLAN        The patient, Winsome Bardales, is a 75 y.o.  female who presents at this time for treatment of the following diagnoses:  Patient Active Hospital Problem List:   Bipolar disorder ([facility] [address] (5/17/2018)    Assessment: dario alternating with depression     Plan: mood stabilizer/ antipsychotic   5/19/18 continue same medications    5/20/18 continue same medication and check Depakote level tomorrow    5/28/18 will increase her depakene to 250 mg TID , Add Haldol 0.5 mg TID as she is already on Haldol Decanoate but still disorganized and paranoid , will go down on Thorazine and adjust the dosage of Haldol and depakene according to her response , will watch for EPS   5/29/18 will continue decrease Thorazine   5/30/18 D/C Thorazine and increase Haldol to 1 mg po TID           I will continue to monitor blood levels (Depakote, Tegretol, lithium, clozapine---a drug with a narrow therapeutic index= NTI) and associated labs for drug therapy implemented that require intense monitoring for toxicity as deemed appropriate based on current medication side effects and pharmacodynamically determined drug 1/2 lives.          A coordinated, multidisplinary treatment team (includes the nurse, unit pharmcist,  and writer) round was conducted for this initial evaluation with the patient present. The following regarding medications was addressed during rounds with patient: depakote   the risks and benefits of the proposed medication. The patient was given the opportunity to ask questions. Informed consent given to the use of the above medications. I will continue to adjust psychiatric and non-psychiatric medications (see above \"medication\" section and orders section for details) as deemed appropriate & based upon diagnoses and response to treatment. I have reviewed admission (and previous/old) labs and medical tests in the EHR and or transferring hospital documents. I will continue to order blood tests/labs and diagnostic tests as deemed appropriate and review results as they become available (see orders for details). I have reviewed old psychiatric and medical records available in the EHR. I Will order additional psychiatric records from other institutions to further elucidate the nature of patient's psychopathology and review once available. I will gather additional collateral information from friends, family and o/p treatment team to further elucidate the nature of patient's psychopathology and baselline level of psychiatric functioning.       ESTIMATED LENGTH OF STAY:    3-5 days        STRENGTHS:  Access to housing/residential stability, Knowledge of medications and Motivated and ready for change                                        SIGNED:    Wade Dior MD  5/30/2018

## 2018-05-30 NOTE — PROGRESS NOTES
Problem: Altered Thought Process (Adult/Pediatric)  Goal: *STG: Participates in treatment plan  Outcome: Progressing Towards Goal  Received this  morning resting in bed. Is alert,but remains confused. AH/VH noted,patient noted to have some internal stimuli. Speech is loud and hyper verbal,difficult to understand at times. Has been taking re-direction this morning and able to follow direction. Remains in room most of the time. Staff to continue to re-assure and re-direct patient as needed and provide a safe environment. Goal: *STG: Complies with medication therapy  Outcome: Progressing Towards Goal  Becca Green been medication and meal compliant.

## 2018-05-30 NOTE — PROGRESS NOTES
Problem: Falls - Risk of  Goal: *Absence of Falls  Document Jovana Fall Risk and appropriate interventions in the flowsheet. Outcome: Progressing Towards Goal  Fall Risk Interventions:  Mobility Interventions: Bed/chair exit alarm, Utilize walker, cane, or other assitive device    Mentation Interventions: Adequate sleep, hydration, pain control, Bed/chair exit alarm, Door open when patient unattended    Medication Interventions: Bed/chair exit alarm       Free of fall. Falls tool completed and accurate. Bed alarm on the bed,but patient is taking it off. Gait stable with walker.

## 2018-05-31 PROCEDURE — 74011250637 HC RX REV CODE- 250/637: Performed by: NURSE PRACTITIONER

## 2018-05-31 PROCEDURE — 74011250637 HC RX REV CODE- 250/637: Performed by: PSYCHIATRY & NEUROLOGY

## 2018-05-31 PROCEDURE — 65220000003 HC RM SEMIPRIVATE PSYCH

## 2018-05-31 RX ADMIN — VALPROIC ACID 250 MG: 250 SOLUTION ORAL at 22:08

## 2018-05-31 RX ADMIN — HALOPERIDOL 1 MG: 2 SOLUTION ORAL at 08:00

## 2018-05-31 RX ADMIN — HALOPERIDOL 1 MG: 2 SOLUTION ORAL at 16:52

## 2018-05-31 RX ADMIN — HALOPERIDOL 1 MG: 2 SOLUTION ORAL at 22:08

## 2018-05-31 RX ADMIN — STANDARDIZED SENNA CONCENTRATE AND DOCUSATE SODIUM 1 TABLET: 8.6; 5 TABLET, FILM COATED ORAL at 21:04

## 2018-05-31 RX ADMIN — BENZTROPINE MESYLATE 1 MG: 1 TABLET ORAL at 21:04

## 2018-05-31 RX ADMIN — VALPROIC ACID 250 MG: 250 SOLUTION ORAL at 09:29

## 2018-05-31 RX ADMIN — LEVOTHYROXINE SODIUM 25 MCG: 25 TABLET ORAL at 06:43

## 2018-05-31 RX ADMIN — VALPROIC ACID 250 MG: 250 SOLUTION ORAL at 16:52

## 2018-05-31 RX ADMIN — SIMVASTATIN 20 MG: 20 TABLET, FILM COATED ORAL at 21:04

## 2018-05-31 NOTE — PROGRESS NOTES
Problem: Altered Thought Process (Adult/Pediatric)  Goal: *STG: Participates in treatment plan  Outcome: Progressing Towards Goal  Up on unit rambling garbled pressured rapid speech. Racing thoughts, confused, alert to self only, easily distracted. Demonstrates poor safety awareness and with poor impulse control. Daily goal is to \"get breakfast and my hair done\". Staff focus is on maintaining safety, offer redirection, support and reassurance. 0800: sitting with staff on dining room reminiscing. Mood and affect remain labile    0900: sitting with staff eating meal. Mood stable     1000: return to room, talking to self in a rapid rambling manner. Staff offer support and reassurnace.  Complaint with medications

## 2018-05-31 NOTE — BH NOTES
Psychiatric progress note            IDENTIFICATION:    Patient Name  Kiran Robison   Date of Birth 1945   [facility] [id number]   Medical Record Number  [id number]      Age  75 y.o. PCP Jatinder Alcantar MD   Admit date:  5/17/2018    Room Number  742/01  @ [facility]   Date of Service  5/31/2018            HISTORY         REASON FOR HOSPITALIZATION:  CC: \"agitation, delusions and dario\". Pt admitted under a temporary skilled nursing order (TDO)  For severe psychosis and dario proving to be an imminent danger to self and others and an inability to care for self. HISTORY OF PRESENT ILLNESS:    The patient, Kiran Robison, is a 75 y.o. BLACK OR  female with a past psychiatric history significant for bipolar d/o , who presents at this time with complaints of (and/or evidence of) the following emotional symptoms: agitation, delusions and psychotic behavior. Additional symptomatology include agitation. The above symptoms have been present for 3 days . These symptoms are of severe severity. These symptoms are constant  in nature. The patient's condition has been precipitated by medication adjustments and psychosocial stressors (family conflicts  ). Patient's condition made worse by treatment noncompliance. UDS: MJ negative; BAL=0.     5/19/18 she is doing better and mood is ann marie and no anger issues and no self harm behavior sne she still has mood instability and gets angry and agitated sometimes  5/20/18 she is hyperverbal and agitated and she gets manic and rambling and she has flight of ideas , compliant with medications and no SI or I    5/21/18- Yesterday had some agitation and poor directability. Mkes obscene comments under her breath. Can also be charming, pleasant, and quite cooperative. 5/22/18- Med and meal compliant. Redirectable. Visible in milieu. 5/23/18-Very p;easant with maggie correa of humor. Social with peers. Has poor insight that she uses profanity and may offend others. But she is not doing this to be provocative. 5/24/18- Pleasant and cooperative. Med meal and group compliant. No side effects  5/25/18- Well mannered and very pleasant. Occasionally pressured and loud non offensive speech. Mood is good and is redirectable. 5/26/18  Mrs. Armaan Handy remains labile. Resting comfortably this morning.  5/2718- Very manic, loud, disorganized and intrusive but redirectable  5/28/18 she is really manic , disorganized , screaming and yelling and not sleeping well , receive many prn medications and sounds her high dosage of thorazine is not working , she still paranoid and she spitting on the floor and licking her spit ,  9/63/06 she is manic and she is tolerating medications and not showing EPS and she slept fairly last night and less aggressive and no hallucination but has flight of ideas  5/30/18 she still manic and she is having racing thoughts and incoherent speech and she did not sleep well last night and did not respond to thorazine , she has no aggressive behavior and she is not showing SI or HI , still paranoid    5/31/18 she is doing better but still rambling and not sleeping that well , she is not aggressive and not paranoid like before and has been not sleeping well at night      ALLERGIES: No Known Allergies   MEDICATIONS PRIOR TO ADMISSION:   Prescriptions Prior to Admission   Medication Sig    chlorproMAZINE (THORAZINE) 200 mg tablet Take 200 mg by mouth daily. With 400mg QPM    chlorproMAZINE (THORAZINE) 200 mg tablet Take 400 mg by mouth every evening.  haloperidol decanoate (HALDOL DECANOATE) 50 mg/mL injection 37.5 mg by IntraMUSCular route every four (4) weeks. Indications: Schizophrenia    divalproex DR (DEPAKOTE) 250 mg tablet Take 250 mg by mouth nightly.  ibuprofen (MOTRIN) 800 mg tablet Take 800 mg by mouth every eight (8) hours as needed for Pain.  simvastatin (ZOCOR) 20 mg tablet Take 20 mg by mouth nightly.     levothyroxine (SYNTHROID) 25 mcg tablet Take 25 mcg by mouth Daily (before breakfast).  melatonin tab tablet Take 10 mg by mouth nightly.  losartan (COZAAR) 50 mg tablet Take 50 mg by mouth daily.  albuterol (PROVENTIL HFA, VENTOLIN HFA, PROAIR HFA) 90 mcg/actuation inhaler Take 1-2 Puffs by inhalation every four (4) hours as needed for Wheezing or Shortness of Breath.  senna-docusate (PERICOLACE) 8.6-50 mg per tablet Take 2 Tabs by mouth two (2) times a day.  amLODIPine (NORVASC) 10 mg tablet Take 10 mg by mouth daily. PAST MEDICAL HISTORY:   Past Medical History:   Diagnosis Date    Asthma     Bipolar 1 disorder ([facility] [address]    No past surgical history on file. SOCIAL HISTORY:    Social History     Social History    Marital status:      Spouse name: N/A    Number of children: N/A    Years of education: N/A     Occupational History    Not on file. Social History Main Topics    Smoking status: Never Smoker    Smokeless tobacco: Not on file    Alcohol use No    Drug use: No    Sexual activity: Not Currently     Other Topics Concern    Not on file     Social History Narrative    75year old AA female admitted on TDO for agitation, making threats and having a fixed delusion that she is . She has been making racial remarks and placing herself in danger. Pt has hx of treatment with Depakote, Thorazine and Haldol Dec.      FAMILY HISTORY:    No family history on file. REVIEW OF SYSTEMS:   Psychological ROS: positive for - irritability  Respiratory ROS: no cough, shortness of breath, or wheezing  Cardiovascular ROS: no chest pain or dyspnea on exertion  Pertinent items are noted in the History of Present Illness. All other Systems reviewed and are considered negative.            MENTAL STATUS EXAM & VITALS     MENTAL STATUS EXAM (MSE):    MSE FINDINGS ARE WITHIN NORMAL LIMITS (WNL) UNLESS OTHERWISE STATED BELOW. ( ALL OF THE BELOW CATEGORIES OF THE MSE HAVE BEEN REVIEWED (reviewed 5/31/2018) AND UPDATED AS DEEMED APPROPRIATE )  General Presentation age appropriate, cooperative   Orientation oriented to time, place and person   Vital Signs  See below (reviewed 5/31/2018); Vital Signs (BP, Pulse, & Temp) are within normal limits if not listed below.    Gait and Station Stable/steady, no ataxia   Musculoskeletal System No extrapyramidal symptoms (EPS); no abnormal muscular movements or Tardive Dyskinesia (TD); muscle strength and tone are within normal limits   Language No aphasia or dysarthria   Speech:  normal pitch and normal volume   Thought Processes concrete; normal rate of thoughts; poor abstract reasoning/computation   Thought Associations goal directed   Thought Content Did not show delusion today    Suicidal Ideations none   Homicidal Ideations none   Mood:  irritable   Affect:  mood-congruent   Memory recent  fair   Memory remote:  fair   Concentration/Attention:  distractable   Fund of Knowledge average   Insight:  limited   Reliability fair   Judgment:  limited          VITALS:     Patient Vitals for the past 24 hrs:   Temp Pulse Resp BP SpO2   05/31/18 0716 98.4 °F (36.9 °C) 62 16 (!) 162/103 96 %   05/30/18 1945 98.5 °F (36.9 °C) 100 18 (!) 157/99 99 %   05/30/18 1610 98.1 °F (36.7 °C) (!) 103 18 (!) 139/96 100 %     Wt Readings from Last 3 Encounters:   05/27/18 76.2 kg (168 lb)   01/01/15 83.9 kg (185 lb)     Temp Readings from Last 3 Encounters:   05/31/18 98.4 °F (36.9 °C)   01/01/15 97.8 °F (36.6 °C)   12/27/13 97 °F (36.1 °C)     BP Readings from Last 3 Encounters:   05/31/18 (!) 162/103   01/01/15 103/65   12/27/13 118/83     Pulse Readings from Last 3 Encounters:   05/31/18 62   01/01/15 87   12/27/13 96            DATA     LABORATORY DATA:  Labs Reviewed   METABOLIC PANEL, COMPREHENSIVE - Abnormal; Notable for the following:        Result Value    BUN/Creatinine ratio 21 (*)     Protein, total 6.0 (*)     Albumin 3.0 (*)     A-G Ratio 1.0 (*)     All other components within normal limits   CBC W/O DIFF - Abnormal; Notable for the following:     WBC 3.2 (*)     All other components within normal limits   VALPROIC ACID - Abnormal; Notable for the following:     Valproic acid 44 (*)     All other components within normal limits   GLUCOSE, FASTING   TSH 3RD GENERATION   LIPID PANEL     Admission on 05/17/2018   Component Date Value Ref Range Status    Sodium 05/18/2018 137  136 - 145 mmol/L Final    Potassium 05/18/2018 3.8  3.5 - 5.1 mmol/L Final    Chloride 05/18/2018 102  97 - 108 mmol/L Final    CO2 05/18/2018 28  21 - 32 mmol/L Final    Anion gap 05/18/2018 7  5 - 15 mmol/L Final    Glucose 05/18/2018 81  65 - 100 mg/dL Final    BUN 05/18/2018 12  6 - 20 MG/DL Final    Creatinine 05/18/2018 0.56  0.55 - 1.02 MG/DL Final    BUN/Creatinine ratio 05/18/2018 21* 12 - 20   Final    GFR est AA 05/18/2018 >60  >60 ml/min/1.73m2 Final    GFR est non-AA 05/18/2018 >60  >60 ml/min/1.73m2 Final    Calcium 05/18/2018 8.6  8.5 - 10.1 MG/DL Final    Bilirubin, total 05/18/2018 0.2  0.2 - 1.0 MG/DL Final    ALT (SGPT) 05/18/2018 20  12 - 78 U/L Final    AST (SGOT) 05/18/2018 17  15 - 37 U/L Final    Alk.  phosphatase 05/18/2018 95  45 - 117 U/L Final    Protein, total 05/18/2018 6.0* 6.4 - 8.2 g/dL Final    Albumin 05/18/2018 3.0* 3.5 - 5.0 g/dL Final    Globulin 05/18/2018 3.0  2.0 - 4.0 g/dL Final    A-G Ratio 05/18/2018 1.0* 1.1 - 2.2   Final    Glucose 05/18/2018 81  65 - 100 MG/DL Final    WBC 05/18/2018 3.2* 3.6 - 11.0 K/uL Final    RBC 05/18/2018 4.10  3.80 - 5.20 M/uL Final    HGB 05/18/2018 12.1  11.5 - 16.0 g/dL Final    HCT 05/18/2018 36.3  35.0 - 47.0 % Final    MCV 05/18/2018 88.5  80.0 - 99.0 FL Final    MCH 05/18/2018 29.5  26.0 - 34.0 PG Final    MCHC 05/18/2018 33.3  30.0 - 36.5 g/dL Final    RDW 05/18/2018 13.2  11.5 - 14.5 % Final    PLATELET 16/78/9371 332  150 - 400 K/uL Final    MPV 05/18/2018 9.0  8.9 - 12.9 FL Final    NRBC 05/18/2018 0.0  0  WBC Final    ABSOLUTE NRBC 05/18/2018 0.00  0.00 - 0.01 K/uL Final    TSH 05/18/2018 1.11  0.36 - 3.74 uIU/mL Final    LIPID PROFILE 05/18/2018        Final    Cholesterol, total 05/18/2018 132  <200 MG/DL Final    Triglyceride 05/18/2018 40  <150 MG/DL Final    HDL Cholesterol 05/18/2018 73  MG/DL Final    LDL, calculated 05/18/2018 51  0 - 100 MG/DL Final    VLDL, calculated 05/18/2018 8  MG/DL Final    CHOL/HDL Ratio 05/18/2018 1.8  0 - 5.0   Final    Valproic acid 05/22/2018 44* 50 - 100 ug/ml Final        RADIOLOGY REPORTS:    Results from Hospital Encounter encounter on 05/17/18   XR CHEST PA LAT   Narrative INDICATION:  cough     Exam: Chest 2 views. Comparison: None. Findings: Cardiomediastinal silhouette is normal. Pulmonary vasculature is not  engorged. No focal parenchymal opacities, effusions, or pneumothorax. Bony  thorax is intact. Impression Impression:  1. No acute cardiopulmonary disease      No results found.            MEDICATIONS       ALL MEDICATIONS  Current Facility-Administered Medications   Medication Dose Route Frequency    haloperidol (HALDOL) 2 mg/mL oral solution 1 mg  1 mg Oral TID    valproic acid (as sodium salt) (DEPAKENE) 250 mg/5 mL (5 mL) oral solution 250 mg  250 mg Oral TID    haloperidol decanoate (HALDOL DECANOATE) 50 mg/mL injection 50 mg  50 mg IntraMUSCular Q28D    benzonatate (TESSALON) capsule 100 mg  100 mg Oral TID PRN    ibuprofen (MOTRIN) tablet 800 mg  800 mg Oral Q8H PRN    levothyroxine (SYNTHROID) tablet 25 mcg  25 mcg Oral ACB    albuterol (PROVENTIL VENTOLIN) nebulizer solution 2.5 mg  2.5 mg Nebulization Q4H PRN    ziprasidone (GEODON) 10 mg in sterile water (preservative free) 0.5 mL injection  10 mg IntraMUSCular BID PRN    benztropine (COGENTIN) tablet 1 mg  1 mg Oral BID PRN    benztropine (COGENTIN) injection 1 mg  1 mg IntraMUSCular BID PRN    zolpidem (AMBIEN) tablet 5 mg  5 mg Oral QHS PRN    acetaminophen (TYLENOL) tablet 650 mg  650 mg Oral Q4H PRN    magnesium hydroxide (MILK OF MAGNESIA) 400 mg/5 mL oral suspension 30 mL  30 mL Oral DAILY PRN    nicotine (NICODERM CQ) 21 mg/24 hr patch 1 Patch  1 Patch TransDERmal DAILY PRN    senna-docusate (PERICOLACE) 8.6-50 mg per tablet 1 Tab  1 Tab Oral QHS    simvastatin (ZOCOR) tablet 20 mg  20 mg Oral QHS      SCHEDULED MEDICATIONS  Current Facility-Administered Medications   Medication Dose Route Frequency    haloperidol (HALDOL) 2 mg/mL oral solution 1 mg  1 mg Oral TID    valproic acid (as sodium salt) (DEPAKENE) 250 mg/5 mL (5 mL) oral solution 250 mg  250 mg Oral TID    haloperidol decanoate (HALDOL DECANOATE) 50 mg/mL injection 50 mg  50 mg IntraMUSCular Q28D    levothyroxine (SYNTHROID) tablet 25 mcg  25 mcg Oral ACB    senna-docusate (PERICOLACE) 8.6-50 mg per tablet 1 Tab  1 Tab Oral QHS    simvastatin (ZOCOR) tablet 20 mg  20 mg Oral QHS                ASSESSMENT & PLAN        The patient, Dontae Marie, is a 75 y.o.  female who presents at this time for treatment of the following diagnoses:  Patient Active Hospital Problem List:   Bipolar disorder ([facility] [address] (5/17/2018)    Assessment: dario alternating with depression     Plan: mood stabilizer/ antipsychotic   5/19/18 continue same medications    5/20/18 continue same medication and check Depakote level tomorrow    5/28/18 will increase her depakene to 250 mg TID , Add Haldol 0.5 mg TID as she is already on Haldol Decanoate but still disorganized and paranoid , will go down on Thorazine and adjust the dosage of Haldol and depakene according to her response , will watch for EPS   5/29/18 will continue decrease Thorazine   5/30/18 D/C Thorazine and increase Haldol to 1 mg po TID   5/31/18 continue same medications           I will continue to monitor blood levels (Depakote, Tegretol, lithium, clozapine---a drug with a narrow therapeutic index= NTI) and associated labs for drug therapy implemented that require intense monitoring for toxicity as deemed appropriate based on current medication side effects and pharmacodynamically determined drug 1/2 lives. A coordinated, multidisplinary treatment team (includes the nurse, unit pharmcist,  and writer) round was conducted for this initial evaluation with the patient present. The following regarding medications was addressed during rounds with patient: depakote   the risks and benefits of the proposed medication. The patient was given the opportunity to ask questions. Informed consent given to the use of the above medications. I will continue to adjust psychiatric and non-psychiatric medications (see above \"medication\" section and orders section for details) as deemed appropriate & based upon diagnoses and response to treatment. I have reviewed admission (and previous/old) labs and medical tests in the EHR and or transferring hospital documents. I will continue to order blood tests/labs and diagnostic tests as deemed appropriate and review results as they become available (see orders for details). I have reviewed old psychiatric and medical records available in the EHR. I Will order additional psychiatric records from other institutions to further elucidate the nature of patient's psychopathology and review once available. I will gather additional collateral information from friends, family and o/p treatment team to further elucidate the nature of patient's psychopathology and baselline level of psychiatric functioning.       ESTIMATED LENGTH OF STAY:    3-5 days        STRENGTHS:  Access to housing/residential stability, Knowledge of medications and Motivated and ready for change                                        SIGNED:    Owen Muller MD  5/31/2018

## 2018-05-31 NOTE — PROGRESS NOTES
Problem: Altered Thought Process (Adult/Pediatric)  Goal: *STG: Participates in treatment plan  Outcome: Progressing Towards Goal  Pt in her room talking loudly at start of shift. Pt came out to dining area and continued to talk loudly but did sit and colored a picture. Pt was asked to return to her room when another pt was being restless. Pt ate dinner in her room and continued to sit quietly at times. Pt compliant with meal and meds. Staff went into pt room after hearing pt banging walker on the floor. When asked what was going on pt said she was  (I think she was saying \"putting black boy in the corner. \"

## 2018-05-31 NOTE — INTERDISCIPLINARY ROUNDS
Behavioral Health Interdisciplinary Rounds     Patient Name: Cherry Vargas  Age: 75 y.o.   Room/Bed:  742/01  Primary [facility]: [facility]   Admission Status: Involuntary Commitment     Readmission within 30 days: no  Power of  in place: no  Patient requires a blocked bed: yes          Reason for blocked bed: disruptive     VTE Prophylaxis:     Mobility needs/Fall risk: yes    Nutritional Plan: no  Consults:         Labs/Testing due today?: no    Sleep hours:  No sleep      Participation in Care/Groups:  no  Medication Compliant?: Yes  PRNS (last 24 hours): None    Restraints (last 24 hours):  no     CIWA (range last 24 hours):     COWS (range last 24 hours):      Alcohol screening (AUDIT) completed -   AUDIT Score: 0     If applicable, date SBIRT discussed in treatment team AND documented:     Tobacco - patient is a smoker: Have You Used Tobacco in the Past 30 Days: No  Illegal Drugs use: Have You Used Any Illegal Substances Over the Past 12 Months: No    24 hour chart check complete: yes     Patient goal(s) for today:  Treatment team focus/goals:   Progress note     LOS:  14  Expected LOS:     Financial concerns/prescription coverage:    Date of last family contact:      Family requesting physician contact today:    Discharge plan:   Guns in the home:       Outpatient provider(s):    Participating treatment team members: Cherry Vargas, * (assigned SW),

## 2018-05-31 NOTE — PROGRESS NOTES
Problem: Falls - Risk of  Goal: *Absence of Falls  Document Jovana Fall Risk and appropriate interventions in the flowsheet.    Outcome: Progressing Towards Goal  Fall Risk Interventions:  Lying in bed talking aloud to self , respirations even and unlabored , NAD noted   Q15 min safety rounds continue     Mobility Interventions: Bed/chair exit alarm    Mentation Interventions: Adequate sleep, hydration, pain control    Medication Interventions: Evaluate medications/consider consulting pharmacy

## 2018-05-31 NOTE — BH NOTES
GROUP THERAPY PROGRESS NOTE    Jazmin saunders participated in a morning Process Group on the Geriatric Unit, with a focus identifying feelings, planning for the day, and singing. Group time: 45 minutes. Personal goal for participation: To increase the capacity to shift ones mood, prepare for the day, and share in group singing. Goal orientation: The patient will be able to prepare for the day through group singing. Group therapy participation: When prompted, this patient barely participated in the group. Therapeutic interventions reviewed and discussed: The group members were introduce themselves by first names and participate in group singing as a way to increase their oxygen and blood flow and begin their day on a positive note. They were also asked to join in singing several songs. Impression of participation: The patient responded at the beginning of group that she was feeling \"great. \" She went on to share in a garbled and rapid voice that was not easy to understand. She was escorted back to her room. She was alert. Her affect was anxious and manic. Her mood reflected her affect and she seemed unable to tolerate the structure of the group this morning. She expressed no current SI/HI, that the undersigned could understand, and displayed no overt psychosis, although she may have been responding to internal stimuli. She moaned as if struggling with chronic back pain when she was accompanied back to her room.

## 2018-05-31 NOTE — PROGRESS NOTES
Problem: Altered Thought Process (Adult/Pediatric)  Goal: *STG: Participates in treatment plan  Outcome: Progressing Towards Goal  Pt received in bed resting. Pt came out to the dining area to watch TV and talk. Pt ate dinner in the dining area. Pt very talkative after dinner. Pt went to her room to change her clothing after dinner. Pt compliant with meal and meds.

## 2018-06-01 LAB
ALBUMIN SERPL-MCNC: 3.3 G/DL (ref 3.5–5)
ALBUMIN/GLOB SERPL: 0.9 {RATIO} (ref 1.1–2.2)
ALP SERPL-CCNC: 96 U/L (ref 45–117)
ALT SERPL-CCNC: 22 U/L (ref 12–78)
AST SERPL-CCNC: 20 U/L (ref 15–37)
BILIRUB DIRECT SERPL-MCNC: 0.2 MG/DL (ref 0–0.2)
BILIRUB SERPL-MCNC: 0.4 MG/DL (ref 0.2–1)
GLOBULIN SER CALC-MCNC: 3.6 G/DL (ref 2–4)
PROT SERPL-MCNC: 6.9 G/DL (ref 6.4–8.2)
VALPROATE SERPL-MCNC: 71 UG/ML (ref 50–100)

## 2018-06-01 PROCEDURE — 65220000003 HC RM SEMIPRIVATE PSYCH

## 2018-06-01 PROCEDURE — 80076 HEPATIC FUNCTION PANEL: CPT | Performed by: PSYCHIATRY & NEUROLOGY

## 2018-06-01 PROCEDURE — 74011250637 HC RX REV CODE- 250/637: Performed by: NURSE PRACTITIONER

## 2018-06-01 PROCEDURE — 74011250637 HC RX REV CODE- 250/637: Performed by: PSYCHIATRY & NEUROLOGY

## 2018-06-01 PROCEDURE — 80164 ASSAY DIPROPYLACETIC ACD TOT: CPT | Performed by: PSYCHIATRY & NEUROLOGY

## 2018-06-01 PROCEDURE — 36415 COLL VENOUS BLD VENIPUNCTURE: CPT | Performed by: PSYCHIATRY & NEUROLOGY

## 2018-06-01 RX ORDER — HALOPERIDOL DECANOATE 100 MG/ML
75 INJECTION INTRAMUSCULAR
Status: DISCONTINUED | OUTPATIENT
Start: 2018-06-04 | End: 2018-06-08 | Stop reason: HOSPADM

## 2018-06-01 RX ADMIN — HALOPERIDOL 1 MG: 2 SOLUTION ORAL at 20:59

## 2018-06-01 RX ADMIN — BENZONATATE 100 MG: 100 CAPSULE ORAL at 21:17

## 2018-06-01 RX ADMIN — VALPROIC ACID 250 MG: 250 SOLUTION ORAL at 21:00

## 2018-06-01 RX ADMIN — HALOPERIDOL 1 MG: 2 SOLUTION ORAL at 13:45

## 2018-06-01 RX ADMIN — VALPROIC ACID 250 MG: 250 SOLUTION ORAL at 09:00

## 2018-06-01 RX ADMIN — STANDARDIZED SENNA CONCENTRATE AND DOCUSATE SODIUM 1 TABLET: 8.6; 5 TABLET, FILM COATED ORAL at 21:00

## 2018-06-01 RX ADMIN — HALOPERIDOL 1 MG: 2 SOLUTION ORAL at 09:00

## 2018-06-01 RX ADMIN — LEVOTHYROXINE SODIUM 25 MCG: 25 TABLET ORAL at 06:31

## 2018-06-01 RX ADMIN — SIMVASTATIN 20 MG: 20 TABLET, FILM COATED ORAL at 21:00

## 2018-06-01 RX ADMIN — ZOLPIDEM TARTRATE 5 MG: 5 TABLET ORAL at 21:36

## 2018-06-01 RX ADMIN — VALPROIC ACID 250 MG: 250 SOLUTION ORAL at 13:45

## 2018-06-01 NOTE — BH NOTES
Psychiatric progress note            IDENTIFICATION:    Patient Name  Angie Carbajal   Date of Birth 1945   [facility] [id number]   Medical Record Number  [id number]      Age  75 y.o. PCP Sophia Pringle MD   Admit date:  5/17/2018    Room Number  742/01  @ [facility]   Date of Service  6/1/2018            HISTORY         REASON FOR HOSPITALIZATION:  CC: \"agitation, delusions and dario\". Pt admitted under a temporary skilled nursing order (TDO)  For severe psychosis and dario proving to be an imminent danger to self and others and an inability to care for self. HISTORY OF PRESENT ILLNESS:    The patient, Angie Carabjal, is a 75 y.o. BLACK OR  female with a past psychiatric history significant for bipolar d/o , who presents at this time with complaints of (and/or evidence of) the following emotional symptoms: agitation, delusions and psychotic behavior. Additional symptomatology include agitation. The above symptoms have been present for 3 days . These symptoms are of severe severity. These symptoms are constant  in nature. The patient's condition has been precipitated by medication adjustments and psychosocial stressors (family conflicts  ). Patient's condition made worse by treatment noncompliance. UDS: MJ negative; BAL=0.     5/19/18 she is doing better and mood is ann marie and no anger issues and no self harm behavior sne she still has mood instability and gets angry and agitated sometimes  5/20/18 she is hyperverbal and agitated and she gets manic and rambling and she has flight of ideas , compliant with medications and no SI or I    5/21/18- Yesterday had some agitation and poor directability. Mkes obscene comments under her breath. Can also be charming, pleasant, and quite cooperative. 5/22/18- Med and meal compliant. Redirectable. Visible in milieu. 5/23/18-Very p;easant with agood brene of humor. Social with peers. Has poor insight that she uses profanity and may offend others. But she is not doing this to be provocative. 5/24/18- Pleasant and cooperative. Med meal and group compliant. No side effects  5/25/18- Well mannered and very pleasant. Occasionally pressured and loud non offensive speech. Mood is good and is redirectable. 5/26/18  Mrs. Khoa Vidales remains labile. Resting comfortably this morning.  5/2718- Very manic, loud, disorganized and intrusive but redirectable  5/28/18 she is really manic , disorganized , screaming and yelling and not sleeping well , receive many prn medications and sounds her high dosage of thorazine is not working , she still paranoid and she spitting on the floor and licking her spit ,  5/70/55 she is manic and she is tolerating medications and not showing EPS and she slept fairly last night and less aggressive and no hallucination but has flight of ideas  5/30/18 she still manic and she is having racing thoughts and incoherent speech and she did not sleep well last night and did not respond to thorazine , she has no aggressive behavior and she is not showing SI or HI , still paranoid    5/31/18 she is doing better but still rambling and not sleeping that well , she is not aggressive and not paranoid like before and has been not sleeping well at night   6/1/18-In good spirits with no aggression. Redirectable with no use of obscenities. ALLERGIES: No Known Allergies   MEDICATIONS PRIOR TO ADMISSION:   Prescriptions Prior to Admission   Medication Sig    chlorproMAZINE (THORAZINE) 200 mg tablet Take 200 mg by mouth daily. With 400mg QPM    chlorproMAZINE (THORAZINE) 200 mg tablet Take 400 mg by mouth every evening.  haloperidol decanoate (HALDOL DECANOATE) 50 mg/mL injection 37.5 mg by IntraMUSCular route every four (4) weeks. Indications: Schizophrenia    divalproex DR (DEPAKOTE) 250 mg tablet Take 250 mg by mouth nightly.  ibuprofen (MOTRIN) 800 mg tablet Take 800 mg by mouth every eight (8) hours as needed for Pain.     simvastatin (ZOCOR) 20 mg tablet Take 20 mg by mouth nightly.  levothyroxine (SYNTHROID) 25 mcg tablet Take 25 mcg by mouth Daily (before breakfast).  melatonin tab tablet Take 10 mg by mouth nightly.  losartan (COZAAR) 50 mg tablet Take 50 mg by mouth daily.  albuterol (PROVENTIL HFA, VENTOLIN HFA, PROAIR HFA) 90 mcg/actuation inhaler Take 1-2 Puffs by inhalation every four (4) hours as needed for Wheezing or Shortness of Breath.  senna-docusate (PERICOLACE) 8.6-50 mg per tablet Take 2 Tabs by mouth two (2) times a day.  amLODIPine (NORVASC) 10 mg tablet Take 10 mg by mouth daily. PAST MEDICAL HISTORY:   Past Medical History:   Diagnosis Date    Asthma     Bipolar 1 disorder ([facility] [address]    No past surgical history on file. SOCIAL HISTORY:    Social History     Social History    Marital status:      Spouse name: N/A    Number of children: N/A    Years of education: N/A     Occupational History    Not on file. Social History Main Topics    Smoking status: Never Smoker    Smokeless tobacco: Not on file    Alcohol use No    Drug use: No    Sexual activity: Not Currently     Other Topics Concern    Not on file     Social History Narrative    75year old AA female admitted on TDO for agitation, making threats and having a fixed delusion that she is . She has been making racial remarks and placing herself in danger. Pt has hx of treatment with Depakote, Thorazine and Haldol Dec.      FAMILY HISTORY:    No family history on file. REVIEW OF SYSTEMS:   Psychological ROS: positive for - irritability  Respiratory ROS: no cough, shortness of breath, or wheezing  Cardiovascular ROS: no chest pain or dyspnea on exertion  Pertinent items are noted in the History of Present Illness. All other Systems reviewed and are considered negative.            MENTAL STATUS EXAM & VITALS     MENTAL STATUS EXAM (MSE):    MSE FINDINGS ARE WITHIN NORMAL LIMITS (WNL) UNLESS OTHERWISE STATED BELOW. ( ALL OF THE BELOW CATEGORIES OF THE MSE HAVE BEEN REVIEWED (reviewed 6/1/2018) AND UPDATED AS DEEMED APPROPRIATE )  General Presentation age appropriate, cooperative   Orientation oriented to time, place and person   Vital Signs  See below (reviewed 6/1/2018); Vital Signs (BP, Pulse, & Temp) are within normal limits if not listed below.    Gait and Station Stable/steady, no ataxia   Musculoskeletal System No extrapyramidal symptoms (EPS); no abnormal muscular movements or Tardive Dyskinesia (TD); muscle strength and tone are within normal limits   Language No aphasia or dysarthria   Speech:  normal pitch and normal volume   Thought Processes concrete; normal rate of thoughts; poor abstract reasoning/computation   Thought Associations goal directed   Thought Content Did not show delusion today    Suicidal Ideations none   Homicidal Ideations none   Mood:  irritable   Affect:  mood-congruent   Memory recent  fair   Memory remote:  fair   Concentration/Attention:  distractable   Fund of Knowledge average   Insight:  limited   Reliability fair   Judgment:  limited          VITALS:     Patient Vitals for the past 24 hrs:   Temp Pulse Resp BP SpO2   06/01/18 1522 99.1 °F (37.3 °C) 93 18 148/89 97 %   06/01/18 0740 97.7 °F (36.5 °C) 95 16 133/86 98 %   05/31/18 1911 95.5 °F (35.3 °C) 96 16 141/86 -     Wt Readings from Last 3 Encounters:   05/27/18 76.2 kg (168 lb)   01/01/15 83.9 kg (185 lb)     Temp Readings from Last 3 Encounters:   06/01/18 99.1 °F (37.3 °C)   01/01/15 97.8 °F (36.6 °C)   12/27/13 97 °F (36.1 °C)     BP Readings from Last 3 Encounters:   06/01/18 148/89   01/01/15 103/65   12/27/13 118/83     Pulse Readings from Last 3 Encounters:   06/01/18 93   01/01/15 87   12/27/13 96            DATA     LABORATORY DATA:  Labs Reviewed   METABOLIC PANEL, COMPREHENSIVE - Abnormal; Notable for the following:        Result Value    BUN/Creatinine ratio 21 (*)     Protein, total 6.0 (*)     Albumin 3.0 (*)     A-G Ratio 1.0 (*) All other components within normal limits   CBC W/O DIFF - Abnormal; Notable for the following:     WBC 3.2 (*)     All other components within normal limits   VALPROIC ACID - Abnormal; Notable for the following:     Valproic acid 44 (*)     All other components within normal limits   HEPATIC FUNCTION PANEL - Abnormal; Notable for the following: Albumin 3.3 (*)     A-G Ratio 0.9 (*)     All other components within normal limits   GLUCOSE, FASTING   TSH 3RD GENERATION   LIPID PANEL   VALPROIC ACID     Admission on 05/17/2018   Component Date Value Ref Range Status    Sodium 05/18/2018 137  136 - 145 mmol/L Final    Potassium 05/18/2018 3.8  3.5 - 5.1 mmol/L Final    Chloride 05/18/2018 102  97 - 108 mmol/L Final    CO2 05/18/2018 28  21 - 32 mmol/L Final    Anion gap 05/18/2018 7  5 - 15 mmol/L Final    Glucose 05/18/2018 81  65 - 100 mg/dL Final    BUN 05/18/2018 12  6 - 20 MG/DL Final    Creatinine 05/18/2018 0.56  0.55 - 1.02 MG/DL Final    BUN/Creatinine ratio 05/18/2018 21* 12 - 20   Final    GFR est AA 05/18/2018 >60  >60 ml/min/1.73m2 Final    GFR est non-AA 05/18/2018 >60  >60 ml/min/1.73m2 Final    Calcium 05/18/2018 8.6  8.5 - 10.1 MG/DL Final    Bilirubin, total 05/18/2018 0.2  0.2 - 1.0 MG/DL Final    ALT (SGPT) 05/18/2018 20  12 - 78 U/L Final    AST (SGOT) 05/18/2018 17  15 - 37 U/L Final    Alk.  phosphatase 05/18/2018 95  45 - 117 U/L Final    Protein, total 05/18/2018 6.0* 6.4 - 8.2 g/dL Final    Albumin 05/18/2018 3.0* 3.5 - 5.0 g/dL Final    Globulin 05/18/2018 3.0  2.0 - 4.0 g/dL Final    A-G Ratio 05/18/2018 1.0* 1.1 - 2.2   Final    Glucose 05/18/2018 81  65 - 100 MG/DL Final    WBC 05/18/2018 3.2* 3.6 - 11.0 K/uL Final    RBC 05/18/2018 4.10  3.80 - 5.20 M/uL Final    HGB 05/18/2018 12.1  11.5 - 16.0 g/dL Final    HCT 05/18/2018 36.3  35.0 - 47.0 % Final    MCV 05/18/2018 88.5  80.0 - 99.0 FL Final    MCH 05/18/2018 29.5  26.0 - 34.0 PG Final    MCHC 05/18/2018 33.3  30.0 - 36.5 g/dL Final    RDW 05/18/2018 13.2  11.5 - 14.5 % Final    PLATELET 69/91/3640 457  150 - 400 K/uL Final    MPV 05/18/2018 9.0  8.9 - 12.9 FL Final    NRBC 05/18/2018 0.0  0  WBC Final    ABSOLUTE NRBC 05/18/2018 0.00  0.00 - 0.01 K/uL Final    TSH 05/18/2018 1.11  0.36 - 3.74 uIU/mL Final    LIPID PROFILE 05/18/2018        Final    Cholesterol, total 05/18/2018 132  <200 MG/DL Final    Triglyceride 05/18/2018 40  <150 MG/DL Final    HDL Cholesterol 05/18/2018 73  MG/DL Final    LDL, calculated 05/18/2018 51  0 - 100 MG/DL Final    VLDL, calculated 05/18/2018 8  MG/DL Final    CHOL/HDL Ratio 05/18/2018 1.8  0 - 5.0   Final    Valproic acid 05/22/2018 44* 50 - 100 ug/ml Final    Valproic acid 06/01/2018 71  50 - 100 ug/ml Final    Protein, total 06/01/2018 6.9  6.4 - 8.2 g/dL Final    Albumin 06/01/2018 3.3* 3.5 - 5.0 g/dL Final    Globulin 06/01/2018 3.6  2.0 - 4.0 g/dL Final    A-G Ratio 06/01/2018 0.9* 1.1 - 2.2   Final    Bilirubin, total 06/01/2018 0.4  0.2 - 1.0 MG/DL Final    Bilirubin, direct 06/01/2018 0.2  0.0 - 0.2 MG/DL Final    Alk. phosphatase 06/01/2018 96  45 - 117 U/L Final    AST (SGOT) 06/01/2018 20  15 - 37 U/L Final    ALT (SGPT) 06/01/2018 22  12 - 78 U/L Final        RADIOLOGY REPORTS:    Results from Hospital Encounter encounter on 05/17/18   XR CHEST PA LAT   Narrative INDICATION:  cough     Exam: Chest 2 views. Comparison: None. Findings: Cardiomediastinal silhouette is normal. Pulmonary vasculature is not  engorged. No focal parenchymal opacities, effusions, or pneumothorax. Bony  thorax is intact. Impression Impression:  1. No acute cardiopulmonary disease      No results found.            MEDICATIONS       ALL MEDICATIONS  Current Facility-Administered Medications   Medication Dose Route Frequency    [START ON 6/4/2018] haloperidol decanoate (HALDOL DECANOATE) 100 mg/mL injection 75 mg  75 mg IntraMUSCular Q28D    haloperidol (HALDOL) 2 mg/mL oral solution 1 mg  1 mg Oral TID    valproic acid (as sodium salt) (DEPAKENE) 250 mg/5 mL (5 mL) oral solution 250 mg  250 mg Oral TID    benzonatate (TESSALON) capsule 100 mg  100 mg Oral TID PRN    ibuprofen (MOTRIN) tablet 800 mg  800 mg Oral Q8H PRN    levothyroxine (SYNTHROID) tablet 25 mcg  25 mcg Oral ACB    albuterol (PROVENTIL VENTOLIN) nebulizer solution 2.5 mg  2.5 mg Nebulization Q4H PRN    ziprasidone (GEODON) 10 mg in sterile water (preservative free) 0.5 mL injection  10 mg IntraMUSCular BID PRN    benztropine (COGENTIN) tablet 1 mg  1 mg Oral BID PRN    benztropine (COGENTIN) injection 1 mg  1 mg IntraMUSCular BID PRN    zolpidem (AMBIEN) tablet 5 mg  5 mg Oral QHS PRN    acetaminophen (TYLENOL) tablet 650 mg  650 mg Oral Q4H PRN    magnesium hydroxide (MILK OF MAGNESIA) 400 mg/5 mL oral suspension 30 mL  30 mL Oral DAILY PRN    nicotine (NICODERM CQ) 21 mg/24 hr patch 1 Patch  1 Patch TransDERmal DAILY PRN    senna-docusate (PERICOLACE) 8.6-50 mg per tablet 1 Tab  1 Tab Oral QHS    simvastatin (ZOCOR) tablet 20 mg  20 mg Oral QHS      SCHEDULED MEDICATIONS  Current Facility-Administered Medications   Medication Dose Route Frequency    [START ON 6/4/2018] haloperidol decanoate (HALDOL DECANOATE) 100 mg/mL injection 75 mg  75 mg IntraMUSCular Q28D    haloperidol (HALDOL) 2 mg/mL oral solution 1 mg  1 mg Oral TID    valproic acid (as sodium salt) (DEPAKENE) 250 mg/5 mL (5 mL) oral solution 250 mg  250 mg Oral TID    levothyroxine (SYNTHROID) tablet 25 mcg  25 mcg Oral ACB    senna-docusate (PERICOLACE) 8.6-50 mg per tablet 1 Tab  1 Tab Oral QHS    simvastatin (ZOCOR) tablet 20 mg  20 mg Oral QHS                ASSESSMENT & PLAN        The patient, Dionicio Bay, is a 75 y.o.  female who presents at this time for treatment of the following diagnoses:  Patient Active Hospital Problem List:   Bipolar disorder ([facility] [address] (5/17/2018)    Assessment: dario alternating with depression     Plan: mood stabilizer/ antipsychotic   5/19/18 continue same medications    5/20/18 continue same medication and check Depakote level tomorrow    5/28/18 will increase her depakene to 250 mg TID , Add Haldol 0.5 mg TID as she is already on Haldol Decanoate but still disorganized and paranoid , will go down on Thorazine and adjust the dosage of Haldol and depakene according to her response , will watch for EPS   5/29/18 will continue decrease Thorazine   5/30/18 D/C Thorazine and increase Haldol to 1 mg po TID   5/31/18 continue same medications           I will continue to monitor blood levels (Depakote, Tegretol, lithium, clozapine---a drug with a narrow therapeutic index= NTI) and associated labs for drug therapy implemented that require intense monitoring for toxicity as deemed appropriate based on current medication side effects and pharmacodynamically determined drug 1/2 lives. A coordinated, multidisplinary treatment team (includes the nurse, unit pharmcist,  and writer) round was conducted for this initial evaluation with the patient present. The following regarding medications was addressed during rounds with patient: depakote   the risks and benefits of the proposed medication. The patient was given the opportunity to ask questions. Informed consent given to the use of the above medications. I will continue to adjust psychiatric and non-psychiatric medications (see above \"medication\" section and orders section for details) as deemed appropriate & based upon diagnoses and response to treatment. I have reviewed admission (and previous/old) labs and medical tests in the EHR and or transferring hospital documents. I will continue to order blood tests/labs and diagnostic tests as deemed appropriate and review results as they become available (see orders for details). I have reviewed old psychiatric and medical records available in the EHR.  I Will order additional psychiatric records from other institutions to further elucidate the nature of patient's psychopathology and review once available. I will gather additional collateral information from friends, family and o/p treatment team to further elucidate the nature of patient's psychopathology and baselline level of psychiatric functioning.       ESTIMATED LENGTH OF STAY:    3-5 days        STRENGTHS:  Access to housing/residential stability, Knowledge of medications and Motivated and ready for change                                        SIGNED:    Kiran Butterfield MD  6/1/2018

## 2018-06-01 NOTE — INTERDISCIPLINARY ROUNDS
Behavioral Health Interdisciplinary Rounds     Patient Name: Winsome Bardales  Age: 75 y.o. Room/Bed:  742/01  Primary Diagnosis: [facility] ([facility]   Admission Status: Involuntary Commitment     Readmission within 30 days: no  Power of  in place: no  Patient requires a blocked bed: yes          Reason for blocked bed: loud, disruptive     VTE Prophylaxis: Not indicated    Mobility needs/Fall risk: yes    Nutritional Plan: no  Consults: no         Labs/Testing due today?: yes - collected     Sleep hours: 7.0        Participation in Care/Groups:  yes  Medication Compliant?: Yes  PRNS (last 24 hours): Anticholinergic    Restraints (last 24 hours):  no     CIWA (range last 24 hours):     COWS (range last 24 hours):      Alcohol screening (AUDIT) completed -   AUDIT Score: 0     If applicable, date SBIRT discussed in treatment team AND documented:     Tobacco - patient is a smoker: Have You Used Tobacco in the Past 30 Days: No  Illegal Drugs use: Have You Used Any Illegal Substances Over the Past 12 Months: No    24 hour chart check complete: yes     Patient goal(s) for today:   Treatment team focus/goals: Plan to titrate her medications. Progress note - she has been doing better on the unit. Depakote level is 71. She has been compliant with her mediations.   Less labile today     LOS:  15  Expected LOS: TBD     Financial concerns/prescription coverage:    Date of last family contact:       Family requesting physician contact today:    Discharge plan: she will return home when ready for discharge   Guns in the home:  No        Outpatient provider(s): [facility] Team   Participating treatment team members: Kunal Campos Dr., RN-

## 2018-06-01 NOTE — BH NOTES
GROUP THERAPY PROGRESS NOTE    Lul Gomez participated in a morning Process Group on the Geriatric Unit, with a focus identifying feelings, planning for the day, and singing. Group time: 45 minutes. Personal goal for participation: To increase the capacity to shift ones mood, prepare for the day, and share in group singing. Goal orientation: The patient will be able to prepare for the day through group singing. Group therapy participation: When prompted, this patient participated in the group. Therapeutic interventions reviewed and discussed: The group members were introduce themselves by first names and participate in group singing as a way to increase their oxygen and blood flow and begin their day on a positive note. They were also asked to join in singing several songs. Impression of participation: The patient said feeling \"alright. \" She was slightly hyper-verbal but could take in the music and had no trouble singing along with familiar lyrics. Her words still remained garbled. Her affect was euthymic and her mood matched her affect. She did not complain of her back pain or moan when she temporarily got out of her chair. The patient expressed no current SI/HI and displayed no overt psychosis, although this was not verified in group. She was actively engaged in group and occasionally needed re-direction due to her tendency to pile her thoughts one on top of another once she began to speak. She smiled and expressed happiness at the end of the session.

## 2018-06-01 NOTE — PROGRESS NOTES
Problem: Altered Thought Process (Adult/Pediatric)  Goal: *STG: Seeks staff when feelings of anxiety and fear arise  Outcome: Progressing Towards Goal  Will continue to teach patient to verbalize feelings of anxiety toward staff members. Goal: *STG: Complies with medication therapy  Outcome: Progressing Towards Goal  Medication compliant. Problem: Falls - Risk of  Goal: *Absence of Falls  Document Jovana Fall Risk and appropriate interventions in the flowsheet. Outcome: Progressing Towards Goal  Fall Risk Interventions:  Mobility Interventions: Bed/chair exit alarm, Patient to call before getting OOB, Utilize walker, cane, or other assitive device    Mentation Interventions: Adequate sleep, hydration, pain control, Bed/chair exit alarm, More frequent rounding, Reorient patient    Medication Interventions: Bed/chair exit alarm, Patient to call before getting OOB, Teach patient to arise slowly     Patient advised to seek Nursing staff for assistance with ambulation. Call bell within reach. Bed in the lowest position.

## 2018-06-01 NOTE — PROGRESS NOTES
Problem: Falls - Risk of  Goal: *Absence of Falls  Document Jovana Fall Risk and appropriate interventions in the flowsheet. Outcome: Progressing Towards Goal  Fall Risk Interventions:  Mobility Interventions: Bed/chair exit alarm, Patient to call before getting OOB  Mentation Interventions: Adequate sleep, hydration, pain control, Door open when patient unattended, Bed/chair exit alarm  Medication Interventions: Teach patient to arise slowly, Patient to call before getting OOB, Evaluate medications/consider consulting pharmacy    21  Pt appears asleep in bed. Respirations even and unlabored. Bed alarm on, call bell within reach. Will monitor with Q 15 safety checks.

## 2018-06-01 NOTE — PROGRESS NOTES
Problem: Altered Thought Process (Adult/Pediatric)  Goal: *STG: Participates in treatment plan  Outcome: Progressing Towards Goal  Pt is sitting in the DR quietly eating her dinner  Less hyperverbal  Med/Meal compliant   No signs of distress noted   Will continue to monitor.

## 2018-06-01 NOTE — PROGRESS NOTES
Laboratory Monitoring for Divalproex/Valproic Acid: This patient is currently prescribed the following medication(s):   Current Facility-Administered Medications   Medication Dose Route Frequency    haloperidol (HALDOL) 2 mg/mL oral solution 1 mg  1 mg Oral TID    valproic acid (as sodium salt) (DEPAKENE) 250 mg/5 mL (5 mL) oral solution 250 mg  250 mg Oral TID    haloperidol decanoate (HALDOL DECANOATE) 50 mg/mL injection 50 mg  50 mg IntraMUSCular Q28D    levothyroxine (SYNTHROID) tablet 25 mcg  25 mcg Oral ACB    senna-docusate (PERICOLACE) 8.6-50 mg per tablet 1 Tab  1 Tab Oral QHS    simvastatin (ZOCOR) tablet 20 mg  20 mg Oral QHS     The following labs have been completed for monitoring of valproic acid:    Valproic Acid Serum Concentration  Lab Results   Component Value Date/Time    Valproic acid 71 06/01/2018 06:15 AM       Hepatic Function  Lab Results   Component Value Date/Time    Bilirubin, total 0.4 06/01/2018 06:15 AM    Protein, total 6.9 06/01/2018 06:15 AM    Albumin 3.3 (L) 06/01/2018 06:15 AM    Globulin 3.6 06/01/2018 06:15 AM    A-G Ratio 0.9 (L) 06/01/2018 06:15 AM    ALT (SGPT) 22 06/01/2018 06:15 AM    Alk. phosphatase 96 06/01/2018 06:15 AM     Hematology  Lab Results   Component Value Date/Time    WBC 3.2 (L) 05/18/2018 06:12 AM    RBC 4.10 05/18/2018 06:12 AM    HGB 12.1 05/18/2018 06:12 AM    HCT 36.3 05/18/2018 06:12 AM    MCV 88.5 05/18/2018 06:12 AM    MCH 29.5 05/18/2018 06:12 AM    MCHC 33.3 05/18/2018 06:12 AM    RDW 13.2 05/18/2018 06:12 AM    PLATELET 935 28/57/3393 06:12 AM     Assessment/Plan:  A valproic acid level was drawn on 6/1/18 @ 0615 and found to be 71 mcg/mL. This level is reflective of steady state (after at least 4 days of therapy at a consistent dose). This level was drawn ~ 9 hours post the evening dose. Based on this level would recommend continuing current dose or could increase to 500mg DR BID if patient remains manic throughout the weekend. Marco Antonio Mccann, PharmD, BCPP, [facility] Specialist, [facility]

## 2018-06-02 PROCEDURE — 74011250637 HC RX REV CODE- 250/637: Performed by: PSYCHIATRY & NEUROLOGY

## 2018-06-02 PROCEDURE — 65220000003 HC RM SEMIPRIVATE PSYCH

## 2018-06-02 PROCEDURE — 74011250637 HC RX REV CODE- 250/637: Performed by: NURSE PRACTITIONER

## 2018-06-02 RX ORDER — HALOPERIDOL 2 MG/ML
2 SOLUTION ORAL 3 TIMES DAILY
Status: DISCONTINUED | OUTPATIENT
Start: 2018-06-02 | End: 2018-06-03

## 2018-06-02 RX ADMIN — ZOLPIDEM TARTRATE 5 MG: 5 TABLET ORAL at 20:35

## 2018-06-02 RX ADMIN — HALOPERIDOL 1 MG: 2 SOLUTION ORAL at 07:35

## 2018-06-02 RX ADMIN — LEVOTHYROXINE SODIUM 25 MCG: 25 TABLET ORAL at 06:34

## 2018-06-02 RX ADMIN — STANDARDIZED SENNA CONCENTRATE AND DOCUSATE SODIUM 1 TABLET: 8.6; 5 TABLET, FILM COATED ORAL at 20:35

## 2018-06-02 RX ADMIN — HALOPERIDOL 1 MG: 2 SOLUTION ORAL at 13:25

## 2018-06-02 RX ADMIN — VALPROIC ACID 250 MG: 250 SOLUTION ORAL at 07:35

## 2018-06-02 RX ADMIN — SIMVASTATIN 20 MG: 20 TABLET, FILM COATED ORAL at 20:34

## 2018-06-02 RX ADMIN — VALPROIC ACID 250 MG: 250 SOLUTION ORAL at 20:34

## 2018-06-02 RX ADMIN — HALOPERIDOL 2 MG: 2 SOLUTION ORAL at 21:11

## 2018-06-02 RX ADMIN — VALPROIC ACID 250 MG: 250 SOLUTION ORAL at 13:25

## 2018-06-02 NOTE — BH NOTES
Psychiatric progress note            IDENTIFICATION:    Patient Name  James Dobbs   Date of Birth 1945   [facility] [id number]   Medical Record Number  [id number]      Age  75 y.o. PCP Antonette Horne MD   Admit date:  5/17/2018    Room Number  742/01  @ [facility]   Date of Service  6/2/2018            HISTORY         REASON FOR HOSPITALIZATION:  CC: \"agitation, delusions and dario\". Pt admitted under a temporary jail order (TDO)  For severe psychosis and dario proving to be an imminent danger to self and others and an inability to care for self. HISTORY OF PRESENT ILLNESS:    The patient, James Dobbs, is a 75 y.o. BLACK OR  female with a past psychiatric history significant for bipolar d/o , who presents at this time with complaints of (and/or evidence of) the following emotional symptoms: agitation, delusions and psychotic behavior. Additional symptomatology include agitation. The above symptoms have been present for 3 days . These symptoms are of severe severity. These symptoms are constant  in nature. The patient's condition has been precipitated by medication adjustments and psychosocial stressors (family conflicts  ). Patient's condition made worse by treatment noncompliance. UDS: MJ negative; BAL=0.     5/19/18 she is doing better and mood is ann marie and no anger issues and no self harm behavior sne she still has mood instability and gets angry and agitated sometimes  5/20/18 she is hyperverbal and agitated and she gets manic and rambling and she has flight of ideas , compliant with medications and no SI or I    5/21/18- Yesterday had some agitation and poor directability. Mkes obscene comments under her breath. Can also be charming, pleasant, and quite cooperative. 5/22/18- Med and meal compliant. Redirectable. Visible in milieu. 5/23/18-Very p;easant with maggie correa of humor. Social with peers. Has poor insight that she uses profanity and may offend others. But she is not doing this to be provocative. 5/24/18- Pleasant and cooperative. Med meal and group compliant. No side effects  5/25/18- Well mannered and very pleasant. Occasionally pressured and loud non offensive speech. Mood is good and is redirectable. 5/26/18  Mrs. Brennon Varela remains labile. Resting comfortably this morning.  5/2718- Very manic, loud, disorganized and intrusive but redirectable  5/28/18 she is really manic , disorganized , screaming and yelling and not sleeping well , receive many prn medications and sounds her high dosage of thorazine is not working , she still paranoid and she spitting on the floor and licking her spit ,  [birth year] she is manic and she is tolerating medications and not showing EPS and she slept fairly last night and less aggressive and no hallucination but has flight of ideas  5/30/18 she still manic and she is having racing thoughts and incoherent speech and she did not sleep well last night and did not respond to thorazine , she has no aggressive behavior and she is not showing SI or HI , still paranoid    5/31/18 she is doing better but still rambling and not sleeping that well , she is not aggressive and not paranoid like before and has been not sleeping well at night   6/1/18-In good spirits with no aggression. Redirectable with no use of obscenities. 6/2/18- pressured speech with tangential and loose associations. Will increase Haldol           ALLERGIES: No Known Allergies   MEDICATIONS PRIOR TO ADMISSION:   Prescriptions Prior to Admission   Medication Sig    chlorproMAZINE (THORAZINE) 200 mg tablet Take 200 mg by mouth daily. With 400mg QPM    chlorproMAZINE (THORAZINE) 200 mg tablet Take 400 mg by mouth every evening.  haloperidol decanoate (HALDOL DECANOATE) 50 mg/mL injection 37.5 mg by IntraMUSCular route every four (4) weeks. Indications: Schizophrenia    divalproex DR (DEPAKOTE) 250 mg tablet Take 250 mg by mouth nightly.     ibuprofen (MOTRIN) 800 mg tablet Take 800 mg by mouth every eight (8) hours as needed for Pain.  simvastatin (ZOCOR) 20 mg tablet Take 20 mg by mouth nightly.  levothyroxine (SYNTHROID) 25 mcg tablet Take 25 mcg by mouth Daily (before breakfast).  melatonin tab tablet Take 10 mg by mouth nightly.  losartan (COZAAR) 50 mg tablet Take 50 mg by mouth daily.  albuterol (PROVENTIL HFA, VENTOLIN HFA, PROAIR HFA) 90 mcg/actuation inhaler Take 1-2 Puffs by inhalation every four (4) hours as needed for Wheezing or Shortness of Breath.  senna-docusate (PERICOLACE) 8.6-50 mg per tablet Take 2 Tabs by mouth two (2) times a day.  amLODIPine (NORVASC) 10 mg tablet Take 10 mg by mouth daily. PAST MEDICAL HISTORY:   Past Medical History:   Diagnosis Date    Asthma     Bipolar 1 disorder ([facility] [address]    No past surgical history on file. SOCIAL HISTORY:    Social History     Social History    Marital status:      Spouse name: N/A    Number of children: N/A    Years of education: N/A     Occupational History    Not on file. Social History Main Topics    Smoking status: Never Smoker    Smokeless tobacco: Not on file    Alcohol use No    Drug use: No    Sexual activity: Not Currently     Other Topics Concern    Not on file     Social History Narrative    75year old AA female admitted on TDO for agitation, making threats and having a fixed delusion that she is . She has been making racial remarks and placing herself in danger. Pt has hx of treatment with Depakote, Thorazine and Haldol Dec.      FAMILY HISTORY:    No family history on file. REVIEW OF SYSTEMS:   Psychological ROS: positive for - irritability  Respiratory ROS: no cough, shortness of breath, or wheezing  Cardiovascular ROS: no chest pain or dyspnea on exertion  Pertinent items are noted in the History of Present Illness. All other Systems reviewed and are considered negative.            MENTAL STATUS EXAM & VITALS     MENTAL STATUS EXAM (MSE):    MSE FINDINGS ARE WITHIN NORMAL LIMITS (WNL) UNLESS OTHERWISE STATED BELOW. ( ALL OF THE BELOW CATEGORIES OF THE MSE HAVE BEEN REVIEWED (reviewed 6/2/2018) AND UPDATED AS DEEMED APPROPRIATE )  General Presentation age appropriate, cooperative   Orientation oriented to time, place and person   Vital Signs  See below (reviewed 6/2/2018); Vital Signs (BP, Pulse, & Temp) are within normal limits if not listed below.    Gait and Station Stable/steady, no ataxia   Musculoskeletal System No extrapyramidal symptoms (EPS); no abnormal muscular movements or Tardive Dyskinesia (TD); muscle strength and tone are within normal limits   Language No aphasia or dysarthria   Speech:  normal pitch and normal volume   Thought Processes concrete; normal rate of thoughts; poor abstract reasoning/computation   Thought Associations goal directed   Thought Content Did not show delusion today    Suicidal Ideations none   Homicidal Ideations none   Mood:  irritable   Affect:  mood-congruent   Memory recent  fair   Memory remote:  fair   Concentration/Attention:  distractable   Fund of Knowledge average   Insight:  limited   Reliability fair   Judgment:  limited          VITALS:     Patient Vitals for the past 24 hrs:   Temp Pulse Resp BP SpO2   06/02/18 0735 97.8 °F (36.6 °C) 91 16 (!) 154/95 99 %   06/01/18 1948 98.7 °F (37.1 °C) 93 18 155/84 98 %   06/01/18 1522 99.1 °F (37.3 °C) 93 18 148/89 97 %     Wt Readings from Last 3 Encounters:   05/27/18 76.2 kg (168 lb)   01/01/15 83.9 kg (185 lb)     Temp Readings from Last 3 Encounters:   06/02/18 97.8 °F (36.6 °C)   01/01/15 97.8 °F (36.6 °C)   12/27/13 97 °F (36.1 °C)     BP Readings from Last 3 Encounters:   06/02/18 (!) 154/95   01/01/15 103/65   12/27/13 118/83     Pulse Readings from Last 3 Encounters:   06/02/18 91   01/01/15 87   12/27/13 96            DATA     LABORATORY DATA:  Labs Reviewed   METABOLIC PANEL, COMPREHENSIVE - Abnormal; Notable for the following:        Result Value BUN/Creatinine ratio 21 (*)     Protein, total 6.0 (*)     Albumin 3.0 (*)     A-G Ratio 1.0 (*)     All other components within normal limits   CBC W/O DIFF - Abnormal; Notable for the following:     WBC 3.2 (*)     All other components within normal limits   VALPROIC ACID - Abnormal; Notable for the following:     Valproic acid 44 (*)     All other components within normal limits   HEPATIC FUNCTION PANEL - Abnormal; Notable for the following: Albumin 3.3 (*)     A-G Ratio 0.9 (*)     All other components within normal limits   GLUCOSE, FASTING   TSH 3RD GENERATION   LIPID PANEL   VALPROIC ACID     Admission on 05/17/2018   Component Date Value Ref Range Status    Sodium 05/18/2018 137  136 - 145 mmol/L Final    Potassium 05/18/2018 3.8  3.5 - 5.1 mmol/L Final    Chloride 05/18/2018 102  97 - 108 mmol/L Final    CO2 05/18/2018 28  21 - 32 mmol/L Final    Anion gap 05/18/2018 7  5 - 15 mmol/L Final    Glucose 05/18/2018 81  65 - 100 mg/dL Final    BUN 05/18/2018 12  6 - 20 MG/DL Final    Creatinine 05/18/2018 0.56  0.55 - 1.02 MG/DL Final    BUN/Creatinine ratio 05/18/2018 21* 12 - 20   Final    GFR est AA 05/18/2018 >60  >60 ml/min/1.73m2 Final    GFR est non-AA 05/18/2018 >60  >60 ml/min/1.73m2 Final    Calcium 05/18/2018 8.6  8.5 - 10.1 MG/DL Final    Bilirubin, total 05/18/2018 0.2  0.2 - 1.0 MG/DL Final    ALT (SGPT) 05/18/2018 20  12 - 78 U/L Final    AST (SGOT) 05/18/2018 17  15 - 37 U/L Final    Alk.  phosphatase 05/18/2018 95  45 - 117 U/L Final    Protein, total 05/18/2018 6.0* 6.4 - 8.2 g/dL Final    Albumin 05/18/2018 3.0* 3.5 - 5.0 g/dL Final    Globulin 05/18/2018 3.0  2.0 - 4.0 g/dL Final    A-G Ratio 05/18/2018 1.0* 1.1 - 2.2   Final    Glucose 05/18/2018 81  65 - 100 MG/DL Final    WBC 05/18/2018 3.2* 3.6 - 11.0 K/uL Final    RBC 05/18/2018 4.10  3.80 - 5.20 M/uL Final    HGB 05/18/2018 12.1  11.5 - 16.0 g/dL Final    HCT 05/18/2018 36.3  35.0 - 47.0 % Final    MCV 05/18/2018 88.5  80.0 - 99.0 FL Final    MCH 05/18/2018 29.5  26.0 - 34.0 PG Final    MCHC 05/18/2018 33.3  30.0 - 36.5 g/dL Final    RDW 05/18/2018 13.2  11.5 - 14.5 % Final    PLATELET 96/32/3446 282  150 - 400 K/uL Final    MPV 05/18/2018 9.0  8.9 - 12.9 FL Final    NRBC 05/18/2018 0.0  0  WBC Final    ABSOLUTE NRBC 05/18/2018 0.00  0.00 - 0.01 K/uL Final    TSH 05/18/2018 1.11  0.36 - 3.74 uIU/mL Final    LIPID PROFILE 05/18/2018        Final    Cholesterol, total 05/18/2018 132  <200 MG/DL Final    Triglyceride 05/18/2018 40  <150 MG/DL Final    HDL Cholesterol 05/18/2018 73  MG/DL Final    LDL, calculated 05/18/2018 51  0 - 100 MG/DL Final    VLDL, calculated 05/18/2018 8  MG/DL Final    CHOL/HDL Ratio 05/18/2018 1.8  0 - 5.0   Final    Valproic acid 05/22/2018 44* 50 - 100 ug/ml Final    Valproic acid 06/01/2018 71  50 - 100 ug/ml Final    Protein, total 06/01/2018 6.9  6.4 - 8.2 g/dL Final    Albumin 06/01/2018 3.3* 3.5 - 5.0 g/dL Final    Globulin 06/01/2018 3.6  2.0 - 4.0 g/dL Final    A-G Ratio 06/01/2018 0.9* 1.1 - 2.2   Final    Bilirubin, total 06/01/2018 0.4  0.2 - 1.0 MG/DL Final    Bilirubin, direct 06/01/2018 0.2  0.0 - 0.2 MG/DL Final    Alk. phosphatase 06/01/2018 96  45 - 117 U/L Final    AST (SGOT) 06/01/2018 20  15 - 37 U/L Final    ALT (SGPT) 06/01/2018 22  12 - 78 U/L Final        RADIOLOGY REPORTS:    Results from Hospital Encounter encounter on 05/17/18   XR CHEST PA LAT   Narrative INDICATION:  cough     Exam: Chest 2 views. Comparison: None. Findings: Cardiomediastinal silhouette is normal. Pulmonary vasculature is not  engorged. No focal parenchymal opacities, effusions, or pneumothorax. Bony  thorax is intact. Impression Impression:  1. No acute cardiopulmonary disease      No results found.            MEDICATIONS       ALL MEDICATIONS  Current Facility-Administered Medications   Medication Dose Route Frequency    haloperidol (HALDOL) 2 mg/mL oral solution 2 mg  2 mg Oral TID    [START ON 6/4/2018] haloperidol decanoate (HALDOL DECANOATE) 100 mg/mL injection 75 mg  75 mg IntraMUSCular Q28D    valproic acid (as sodium salt) (DEPAKENE) 250 mg/5 mL (5 mL) oral solution 250 mg  250 mg Oral TID    benzonatate (TESSALON) capsule 100 mg  100 mg Oral TID PRN    ibuprofen (MOTRIN) tablet 800 mg  800 mg Oral Q8H PRN    levothyroxine (SYNTHROID) tablet 25 mcg  25 mcg Oral ACB    albuterol (PROVENTIL VENTOLIN) nebulizer solution 2.5 mg  2.5 mg Nebulization Q4H PRN    ziprasidone (GEODON) 10 mg in sterile water (preservative free) 0.5 mL injection  10 mg IntraMUSCular BID PRN    benztropine (COGENTIN) tablet 1 mg  1 mg Oral BID PRN    benztropine (COGENTIN) injection 1 mg  1 mg IntraMUSCular BID PRN    zolpidem (AMBIEN) tablet 5 mg  5 mg Oral QHS PRN    acetaminophen (TYLENOL) tablet 650 mg  650 mg Oral Q4H PRN    magnesium hydroxide (MILK OF MAGNESIA) 400 mg/5 mL oral suspension 30 mL  30 mL Oral DAILY PRN    nicotine (NICODERM CQ) 21 mg/24 hr patch 1 Patch  1 Patch TransDERmal DAILY PRN    senna-docusate (PERICOLACE) 8.6-50 mg per tablet 1 Tab  1 Tab Oral QHS    simvastatin (ZOCOR) tablet 20 mg  20 mg Oral QHS      SCHEDULED MEDICATIONS  Current Facility-Administered Medications   Medication Dose Route Frequency    haloperidol (HALDOL) 2 mg/mL oral solution 2 mg  2 mg Oral TID    [START ON 6/4/2018] haloperidol decanoate (HALDOL DECANOATE) 100 mg/mL injection 75 mg  75 mg IntraMUSCular Q28D    valproic acid (as sodium salt) (DEPAKENE) 250 mg/5 mL (5 mL) oral solution 250 mg  250 mg Oral TID    levothyroxine (SYNTHROID) tablet 25 mcg  25 mcg Oral ACB    senna-docusate (PERICOLACE) 8.6-50 mg per tablet 1 Tab  1 Tab Oral QHS    simvastatin (ZOCOR) tablet 20 mg  20 mg Oral QHS                ASSESSMENT & PLAN        The patient, Chin Molina, is a 75 y.o.  female who presents at this time for treatment of the following diagnoses:  Patient Active Hospital Problem List:   Bipolar disorder ([facility] [address] (5/17/2018)    Assessment: dario alternating with depression     Plan: mood stabilizer/ antipsychotic   5/19/18 continue same medications    5/20/18 continue same medication and check Depakote level tomorrow    5/28/18 will increase her depakene to 250 mg TID , Add Haldol 0.5 mg TID as she is already on Haldol Decanoate but still disorganized and paranoid , will go down on Thorazine and adjust the dosage of Haldol and depakene according to her response , will watch for EPS   5/29/18 will continue decrease Thorazine   5/30/18 D/C Thorazine and increase Haldol to 1 mg po TID   5/31/18 continue same medications           I will continue to monitor blood levels (Depakote, Tegretol, lithium, clozapine---a drug with a narrow therapeutic index= NTI) and associated labs for drug therapy implemented that require intense monitoring for toxicity as deemed appropriate based on current medication side effects and pharmacodynamically determined drug 1/2 lives. A coordinated, multidisplinary treatment team (includes the nurse, unit pharmcist,  and writer) round was conducted for this initial evaluation with the patient present. The following regarding medications was addressed during rounds with patient: depakote   the risks and benefits of the proposed medication. The patient was given the opportunity to ask questions. Informed consent given to the use of the above medications. I will continue to adjust psychiatric and non-psychiatric medications (see above \"medication\" section and orders section for details) as deemed appropriate & based upon diagnoses and response to treatment. I have reviewed admission (and previous/old) labs and medical tests in the EHR and or transferring hospital documents.  I will continue to order blood tests/labs and diagnostic tests as deemed appropriate and review results as they become available (see orders for details). I have reviewed old psychiatric and medical records available in the EHR. I Will order additional psychiatric records from other institutions to further elucidate the nature of patient's psychopathology and review once available. I will gather additional collateral information from friends, family and o/p treatment team to further elucidate the nature of patient's psychopathology and baselline level of psychiatric functioning.       ESTIMATED LENGTH OF STAY:    3-5 days        STRENGTHS:  Access to housing/residential stability, Knowledge of medications and Motivated and ready for change                                        SIGNED:    Geovany Lara MD  6/2/2018

## 2018-06-02 NOTE — PROGRESS NOTES
Problem: Falls - Risk of  Goal: *Absence of Falls  Document Jovana Fall Risk and appropriate interventions in the flowsheet.    Outcome: Progressing Towards Goal  Fall Risk Interventions:  Lying quietly in bed with eyes closed , respirations even and unlabored , NAD noted   Q15 min safety rounds continue , walker at bedside within reach   Mobility Interventions: Bed/chair exit alarm    Mentation Interventions: Adequate sleep, hydration, pain control    Medication Interventions: Bed/chair exit alarm

## 2018-06-02 NOTE — PROGRESS NOTES
Problem: Altered Thought Process (Adult/Pediatric)  Goal: *STG: Participates in treatment plan  Outcome: Progressing Towards Goal  Pt is out on the unit watching TV. Less hyperverbal    redirectable and cooperative   Med/meal compliant   Will continue to monitor.

## 2018-06-02 NOTE — PROGRESS NOTES
Problem: Altered Thought Process (Adult/Pediatric)  Goal: *STG: Complies with medication therapy  Outcome: Progressing Towards Goal  1530: Greeted patient on unit in room resting quietly. Appeared in no acute distress. Will continue to monitor on Q 15 minute safety checks. 1700: Alert, continues to be hyperverbal, conversing with herself. Vitals stable, wnl.   Medication compliant. 2035:PRN Medication Documentation    Specific patient behavior that led to need for PRN medication:insomnia, restlessness  Staff interventions attempted prior to PRN being given:education, support. PRN medication given: ambien 5 mg po  Patient response/effectiveness of PRN medication:patient appears to be resting quietly.

## 2018-06-02 NOTE — INTERDISCIPLINARY ROUNDS
Behavioral Health Interdisciplinary Rounds     Patient Name: Abby Finn  Age: 75 y.o.   Room/Bed:  742/01  Primary [facility]: [facility]   Admission Status: Involuntary Commitment     Readmission within 30 days: no  Power of  in place: no  Patient requires a blocked bed: yes          Reason for blocked bed:     VTE Prophylaxis: Not indicated    Mobility needs/Fall risk: yes    Nutritional Plan: no  Consults:        Labs/Testing due today?: no    Sleep hours:  7hrs  45min      Participation in Care/Groups:  yes  Medication Compliant?: Yes  PRNS (last 24 hours): Sleep Aid    Restraints (last 24 hours):  no     CIWA (range last 24 hours):     COWS (range last 24 hours):      Alcohol screening (AUDIT) completed -   AUDIT Score: 0     If applicable, date SBIRT discussed in treatment team AND documented:     Tobacco - patient is a smoker: Have You Used Tobacco in the Past 30 Days: No  Illegal Drugs use: Have You Used Any Illegal Substances Over the Past 12 Months: No    24 hour chart check complete: yes     Patient goal(s) for today:   Treatment team focus/goals:   Progress note     LOS:  16  Expected LOS:     Financial concerns/prescription coverage:    Date of last family contact:     Family requesting physician contact today:    Discharge plan:   Guns in the home:       Outpatient provider(s):    Participating treatment team members: Abby Finn, * (assigned SW),

## 2018-06-03 PROCEDURE — 74011250637 HC RX REV CODE- 250/637: Performed by: NURSE PRACTITIONER

## 2018-06-03 PROCEDURE — 65220000003 HC RM SEMIPRIVATE PSYCH

## 2018-06-03 PROCEDURE — 74011250637 HC RX REV CODE- 250/637: Performed by: PSYCHIATRY & NEUROLOGY

## 2018-06-03 RX ORDER — LISINOPRIL 10 MG/1
10 TABLET ORAL DAILY
Status: DISCONTINUED | OUTPATIENT
Start: 2018-06-03 | End: 2018-06-08 | Stop reason: HOSPADM

## 2018-06-03 RX ORDER — HALOPERIDOL 2 MG/ML
3 SOLUTION ORAL 3 TIMES DAILY
Status: DISCONTINUED | OUTPATIENT
Start: 2018-06-03 | End: 2018-06-06

## 2018-06-03 RX ORDER — VALPROIC ACID 250 MG/5ML
500 SOLUTION ORAL 2 TIMES DAILY
Status: DISCONTINUED | OUTPATIENT
Start: 2018-06-03 | End: 2018-06-08 | Stop reason: HOSPADM

## 2018-06-03 RX ORDER — FUROSEMIDE 20 MG/1
20 TABLET ORAL DAILY
Status: DISCONTINUED | OUTPATIENT
Start: 2018-06-03 | End: 2018-06-08 | Stop reason: HOSPADM

## 2018-06-03 RX ADMIN — LEVOTHYROXINE SODIUM 25 MCG: 25 TABLET ORAL at 06:40

## 2018-06-03 RX ADMIN — VALPROIC ACID 500 MG: 250 SOLUTION ORAL at 17:05

## 2018-06-03 RX ADMIN — LISINOPRIL 10 MG: 10 TABLET ORAL at 17:54

## 2018-06-03 RX ADMIN — HALOPERIDOL 3 MG: 2 SOLUTION ORAL at 21:10

## 2018-06-03 RX ADMIN — VALPROIC ACID 250 MG: 250 SOLUTION ORAL at 08:33

## 2018-06-03 RX ADMIN — HALOPERIDOL 2 MG: 2 SOLUTION ORAL at 08:34

## 2018-06-03 RX ADMIN — SIMVASTATIN 20 MG: 20 TABLET, FILM COATED ORAL at 21:10

## 2018-06-03 RX ADMIN — BENZONATATE 100 MG: 100 CAPSULE ORAL at 10:04

## 2018-06-03 RX ADMIN — STANDARDIZED SENNA CONCENTRATE AND DOCUSATE SODIUM 1 TABLET: 8.6; 5 TABLET, FILM COATED ORAL at 21:10

## 2018-06-03 RX ADMIN — FUROSEMIDE 20 MG: 20 TABLET ORAL at 17:54

## 2018-06-03 NOTE — BH NOTES
Psychiatric progress note            IDENTIFICATION:    Patient Name  Winsome Bardales   Date of Birth 1945   [facility] [id number]   Medical Record Number  [id number]      Age  75 y.o. PCP Lisandro Rodney MD   Admit date:  5/17/2018    Room Number  742/01  @ [facility]   Date of Service  6/3/2018            HISTORY         REASON FOR HOSPITALIZATION:  CC: \"agitation, delusions and dario\". Pt admitted under a temporary shelter order (TDO)  For severe psychosis and dario proving to be an imminent danger to self and others and an inability to care for self. HISTORY OF PRESENT ILLNESS:    The patient, Winsome Bardales, is a 75 y.o. BLACK OR  female with a past psychiatric history significant for bipolar d/o , who presents at this time with complaints of (and/or evidence of) the following emotional symptoms: agitation, delusions and psychotic behavior. Additional symptomatology include agitation. The above symptoms have been present for 3 days . These symptoms are of severe severity. These symptoms are constant  in nature. The patient's condition has been precipitated by medication adjustments and psychosocial stressors (family conflicts  ). Patient's condition made worse by treatment noncompliance. UDS: MJ negative; BAL=0.     5/19/18 she is doing better and mood is ann marie and no anger issues and no self harm behavior sne she still has mood instability and gets angry and agitated sometimes  5/20/18 she is hyperverbal and agitated and she gets manic and rambling and she has flight of ideas , compliant with medications and no SI or I    5/21/18- Yesterday had some agitation and poor directability. Mkes obscene comments under her breath. Can also be charming, pleasant, and quite cooperative. 5/22/18- Med and meal compliant. Redirectable. Visible in milieu. 5/23/18-Very p;easant with maggie correa of humor. Social with peers. Has poor insight that she uses profanity and may offend others. But she is not doing this to be provocative. 5/24/18- Pleasant and cooperative. Med meal and group compliant. No side effects  5/25/18- Well mannered and very pleasant. Occasionally pressured and loud non offensive speech. Mood is good and is redirectable. 5/26/18  Mrs. Satish Stinson remains labile. Resting comfortably this morning.  5/2718- Very manic, loud, disorganized and intrusive but redirectable  5/28/18 she is really manic , disorganized , screaming and yelling and not sleeping well , receive many prn medications and sounds her high dosage of thorazine is not working , she still paranoid and she spitting on the floor and licking her spit ,  8/35/38 she is manic and she is tolerating medications and not showing EPS and she slept fairly last night and less aggressive and no hallucination but has flight of ideas  5/30/18 she still manic and she is having racing thoughts and incoherent speech and she did not sleep well last night and did not respond to thorazine , she has no aggressive behavior and she is not showing SI or HI , still paranoid    5/31/18 she is doing better but still rambling and not sleeping that well , she is not aggressive and not paranoid like before and has been not sleeping well at night   6/1/18-In good spirits with no aggression. Redirectable with no use of obscenities. 6/2/18- pressured speech with tangential and loose associations. Will increase Haldol  6//3/18- continues with pressured and tangential speech with loose associations. Behavior is appropriate. No aggression or agitation. Very pleasant. Will continue to increase meds. ALLERGIES: No Known Allergies   MEDICATIONS PRIOR TO ADMISSION:   Prescriptions Prior to Admission   Medication Sig    chlorproMAZINE (THORAZINE) 200 mg tablet Take 200 mg by mouth daily. With 400mg QPM    chlorproMAZINE (THORAZINE) 200 mg tablet Take 400 mg by mouth every evening.     haloperidol decanoate (HALDOL DECANOATE) 50 mg/mL injection 37.5 mg by IntraMUSCular route every four (4) weeks. Indications: Schizophrenia    divalproex DR (DEPAKOTE) 250 mg tablet Take 250 mg by mouth nightly.  ibuprofen (MOTRIN) 800 mg tablet Take 800 mg by mouth every eight (8) hours as needed for Pain.  simvastatin (ZOCOR) 20 mg tablet Take 20 mg by mouth nightly.  levothyroxine (SYNTHROID) 25 mcg tablet Take 25 mcg by mouth Daily (before breakfast).  melatonin tab tablet Take 10 mg by mouth nightly.  losartan (COZAAR) 50 mg tablet Take 50 mg by mouth daily.  albuterol (PROVENTIL HFA, VENTOLIN HFA, PROAIR HFA) 90 mcg/actuation inhaler Take 1-2 Puffs by inhalation every four (4) hours as needed for Wheezing or Shortness of Breath.  senna-docusate (PERICOLACE) 8.6-50 mg per tablet Take 2 Tabs by mouth two (2) times a day.  amLODIPine (NORVASC) 10 mg tablet Take 10 mg by mouth daily. PAST MEDICAL HISTORY:   Past Medical History:   Diagnosis Date    Asthma     Bipolar 1 disorder ([facility] [address]    No past surgical history on file. SOCIAL HISTORY:    Social History     Social History    Marital status:      Spouse name: N/A    Number of children: N/A    Years of education: N/A     Occupational History    Not on file. Social History Main Topics    Smoking status: Never Smoker    Smokeless tobacco: Not on file    Alcohol use No    Drug use: No    Sexual activity: Not Currently     Other Topics Concern    Not on file     Social History Narrative    75year old AA female admitted on TDO for agitation, making threats and having a fixed delusion that she is . She has been making racial remarks and placing herself in danger. Pt has hx of treatment with Depakote, Thorazine and Haldol Dec.      FAMILY HISTORY:    No family history on file.     REVIEW OF SYSTEMS:   Psychological ROS: positive for - irritability  Respiratory ROS: no cough, shortness of breath, or wheezing  Cardiovascular ROS: no chest pain or dyspnea on exertion  Pertinent items are noted in the History of Present Illness. All other Systems reviewed and are considered negative. MENTAL STATUS EXAM & VITALS     MENTAL STATUS EXAM (MSE):    MSE FINDINGS ARE WITHIN NORMAL LIMITS (WNL) UNLESS OTHERWISE STATED BELOW. ( ALL OF THE BELOW CATEGORIES OF THE MSE HAVE BEEN REVIEWED (reviewed 6/3/2018) AND UPDATED AS DEEMED APPROPRIATE )  General Presentation age appropriate, cooperative   Orientation oriented to time, place and person   Vital Signs  See below (reviewed 6/3/2018); Vital Signs (BP, Pulse, & Temp) are within normal limits if not listed below.    Gait and Station Stable/steady, no ataxia   Musculoskeletal System No extrapyramidal symptoms (EPS); no abnormal muscular movements or Tardive Dyskinesia (TD); muscle strength and tone are within normal limits   Language No aphasia or dysarthria   Speech:  normal pitch and normal volume   Thought Processes concrete; normal rate of thoughts; poor abstract reasoning/computation   Thought Associations goal directed   Thought Content Did not show delusion today    Suicidal Ideations none   Homicidal Ideations none   Mood:  irritable   Affect:  mood-congruent   Memory recent  fair   Memory remote:  fair   Concentration/Attention:  distractable   Fund of Knowledge average   Insight:  limited   Reliability fair   Judgment:  limited          VITALS:     Patient Vitals for the past 24 hrs:   Temp Pulse Resp BP SpO2   06/03/18 0724 98.3 °F (36.8 °C) 79 18 (!) 131/91 97 %   06/03/18 0030 98.9 °F (37.2 °C) 92 20 (!) 170/99 97 %   06/02/18 2040 97.8 °F (36.6 °C) 93 16 (!) 185/95 97 %     Wt Readings from Last 3 Encounters:   06/03/18 74.1 kg (163 lb 6.4 oz)   01/01/15 83.9 kg (185 lb)     Temp Readings from Last 3 Encounters:   06/03/18 98.3 °F (36.8 °C)   01/01/15 97.8 °F (36.6 °C)   12/27/13 97 °F (36.1 °C)     BP Readings from Last 3 Encounters:   06/03/18 (!) 131/91   01/01/15 103/65   12/27/13 118/83     Pulse Readings from Last 3 Encounters:   06/03/18 79   01/01/15 87   12/27/13 96            DATA     LABORATORY DATA:  Labs Reviewed   METABOLIC PANEL, COMPREHENSIVE - Abnormal; Notable for the following:        Result Value    BUN/Creatinine ratio 21 (*)     Protein, total 6.0 (*)     Albumin 3.0 (*)     A-G Ratio 1.0 (*)     All other components within normal limits   CBC W/O DIFF - Abnormal; Notable for the following:     WBC 3.2 (*)     All other components within normal limits   VALPROIC ACID - Abnormal; Notable for the following:     Valproic acid 44 (*)     All other components within normal limits   HEPATIC FUNCTION PANEL - Abnormal; Notable for the following: Albumin 3.3 (*)     A-G Ratio 0.9 (*)     All other components within normal limits   GLUCOSE, FASTING   TSH 3RD GENERATION   LIPID PANEL   VALPROIC ACID     Admission on 05/17/2018   Component Date Value Ref Range Status    Sodium 05/18/2018 137  136 - 145 mmol/L Final    Potassium 05/18/2018 3.8  3.5 - 5.1 mmol/L Final    Chloride 05/18/2018 102  97 - 108 mmol/L Final    CO2 05/18/2018 28  21 - 32 mmol/L Final    Anion gap 05/18/2018 7  5 - 15 mmol/L Final    Glucose 05/18/2018 81  65 - 100 mg/dL Final    BUN 05/18/2018 12  6 - 20 MG/DL Final    Creatinine 05/18/2018 0.56  0.55 - 1.02 MG/DL Final    BUN/Creatinine ratio 05/18/2018 21* 12 - 20   Final    GFR est AA 05/18/2018 >60  >60 ml/min/1.73m2 Final    GFR est non-AA 05/18/2018 >60  >60 ml/min/1.73m2 Final    Calcium 05/18/2018 8.6  8.5 - 10.1 MG/DL Final    Bilirubin, total 05/18/2018 0.2  0.2 - 1.0 MG/DL Final    ALT (SGPT) 05/18/2018 20  12 - 78 U/L Final    AST (SGOT) 05/18/2018 17  15 - 37 U/L Final    Alk.  phosphatase 05/18/2018 95  45 - 117 U/L Final    Protein, total 05/18/2018 6.0* 6.4 - 8.2 g/dL Final    Albumin 05/18/2018 3.0* 3.5 - 5.0 g/dL Final    Globulin 05/18/2018 3.0  2.0 - 4.0 g/dL Final    A-G Ratio 05/18/2018 1.0* 1.1 - 2.2   Final    Glucose 05/18/2018 81  65 - 100 MG/DL Final  WBC 05/18/2018 3.2* 3.6 - 11.0 K/uL Final    RBC 05/18/2018 4.10  3.80 - 5.20 M/uL Final    HGB 05/18/2018 12.1  11.5 - 16.0 g/dL Final    HCT 05/18/2018 36.3  35.0 - 47.0 % Final    MCV 05/18/2018 88.5  80.0 - 99.0 FL Final    MCH 05/18/2018 29.5  26.0 - 34.0 PG Final    MCHC 05/18/2018 33.3  30.0 - 36.5 g/dL Final    RDW 05/18/2018 13.2  11.5 - 14.5 % Final    PLATELET 27/74/3185 829  150 - 400 K/uL Final    MPV 05/18/2018 9.0  8.9 - 12.9 FL Final    NRBC 05/18/2018 0.0  0  WBC Final    ABSOLUTE NRBC 05/18/2018 0.00  0.00 - 0.01 K/uL Final    TSH 05/18/2018 1.11  0.36 - 3.74 uIU/mL Final    LIPID PROFILE 05/18/2018        Final    Cholesterol, total 05/18/2018 132  <200 MG/DL Final    Triglyceride 05/18/2018 40  <150 MG/DL Final    HDL Cholesterol 05/18/2018 73  MG/DL Final    LDL, calculated 05/18/2018 51  0 - 100 MG/DL Final    VLDL, calculated 05/18/2018 8  MG/DL Final    CHOL/HDL Ratio 05/18/2018 1.8  0 - 5.0   Final    Valproic acid 05/22/2018 44* 50 - 100 ug/ml Final    Valproic acid 06/01/2018 71  50 - 100 ug/ml Final    Protein, total 06/01/2018 6.9  6.4 - 8.2 g/dL Final    Albumin 06/01/2018 3.3* 3.5 - 5.0 g/dL Final    Globulin 06/01/2018 3.6  2.0 - 4.0 g/dL Final    A-G Ratio 06/01/2018 0.9* 1.1 - 2.2   Final    Bilirubin, total 06/01/2018 0.4  0.2 - 1.0 MG/DL Final    Bilirubin, direct 06/01/2018 0.2  0.0 - 0.2 MG/DL Final    Alk. phosphatase 06/01/2018 96  45 - 117 U/L Final    AST (SGOT) 06/01/2018 20  15 - 37 U/L Final    ALT (SGPT) 06/01/2018 22  12 - 78 U/L Final        RADIOLOGY REPORTS:    Results from Hospital Encounter encounter on 05/17/18   XR CHEST PA LAT   Narrative INDICATION:  cough     Exam: Chest 2 views. Comparison: None. Findings: Cardiomediastinal silhouette is normal. Pulmonary vasculature is not  engorged. No focal parenchymal opacities, effusions, or pneumothorax. Bony  thorax is intact. Impression Impression:  1.  No acute cardiopulmonary disease      No results found.            MEDICATIONS       ALL MEDICATIONS  Current Facility-Administered Medications   Medication Dose Route Frequency    valproic acid (as sodium salt) (DEPAKENE) 250 mg/5 mL (5 mL) oral solution 500 mg  500 mg Oral BID    haloperidol (HALDOL) 2 mg/mL oral solution 3 mg  3 mg Oral TID    [START ON 6/4/2018] haloperidol decanoate (HALDOL DECANOATE) 100 mg/mL injection 75 mg  75 mg IntraMUSCular Q28D    benzonatate (TESSALON) capsule 100 mg  100 mg Oral TID PRN    ibuprofen (MOTRIN) tablet 800 mg  800 mg Oral Q8H PRN    levothyroxine (SYNTHROID) tablet 25 mcg  25 mcg Oral ACB    albuterol (PROVENTIL VENTOLIN) nebulizer solution 2.5 mg  2.5 mg Nebulization Q4H PRN    ziprasidone (GEODON) 10 mg in sterile water (preservative free) 0.5 mL injection  10 mg IntraMUSCular BID PRN    benztropine (COGENTIN) tablet 1 mg  1 mg Oral BID PRN    benztropine (COGENTIN) injection 1 mg  1 mg IntraMUSCular BID PRN    zolpidem (AMBIEN) tablet 5 mg  5 mg Oral QHS PRN    acetaminophen (TYLENOL) tablet 650 mg  650 mg Oral Q4H PRN    magnesium hydroxide (MILK OF MAGNESIA) 400 mg/5 mL oral suspension 30 mL  30 mL Oral DAILY PRN    nicotine (NICODERM CQ) 21 mg/24 hr patch 1 Patch  1 Patch TransDERmal DAILY PRN    senna-docusate (PERICOLACE) 8.6-50 mg per tablet 1 Tab  1 Tab Oral QHS    simvastatin (ZOCOR) tablet 20 mg  20 mg Oral QHS      SCHEDULED MEDICATIONS  Current Facility-Administered Medications   Medication Dose Route Frequency    valproic acid (as sodium salt) (DEPAKENE) 250 mg/5 mL (5 mL) oral solution 500 mg  500 mg Oral BID    haloperidol (HALDOL) 2 mg/mL oral solution 3 mg  3 mg Oral TID    [START ON 6/4/2018] haloperidol decanoate (HALDOL DECANOATE) 100 mg/mL injection 75 mg  75 mg IntraMUSCular Q28D    levothyroxine (SYNTHROID) tablet 25 mcg  25 mcg Oral ACB    senna-docusate (PERICOLACE) 8.6-50 mg per tablet 1 Tab  1 Tab Oral QHS    simvastatin (ZOCOR) tablet 20 mg  20 mg Oral QHS                ASSESSMENT & PLAN        The patient, James Dobbs, is a 75 y.o.  female who presents at this time for treatment of the following diagnoses:  Patient Active Hospital Problem List:   Bipolar disorder ([facility] [address] (5/17/2018)    Assessment: dario alternating with depression     Plan: mood stabilizer/ antipsychotic   5/19/18 continue same medications    5/20/18 continue same medication and check Depakote level tomorrow    5/28/18 will increase her depakene to 250 mg TID , Add Haldol 0.5 mg TID as she is already on Haldol Decanoate but still disorganized and paranoid , will go down on Thorazine and adjust the dosage of Haldol and depakene according to her response , will watch for EPS   5/29/18 will continue decrease Thorazine   5/30/18 D/C Thorazine and increase Haldol to 1 mg po TID   5/31/18 continue same medications           I will continue to monitor blood levels (Depakote, Tegretol, lithium, clozapine---a drug with a narrow therapeutic index= NTI) and associated labs for drug therapy implemented that require intense monitoring for toxicity as deemed appropriate based on current medication side effects and pharmacodynamically determined drug 1/2 lives. A coordinated, multidisplinary treatment team (includes the nurse, unit pharmcist,  and writer) round was conducted for this initial evaluation with the patient present. The following regarding medications was addressed during rounds with patient: depakote   the risks and benefits of the proposed medication. The patient was given the opportunity to ask questions. Informed consent given to the use of the above medications. I will continue to adjust psychiatric and non-psychiatric medications (see above \"medication\" section and orders section for details) as deemed appropriate & based upon diagnoses and response to treatment.      I have reviewed admission (and previous/old) labs and medical tests in the EHR and or transferring hospital documents. I will continue to order blood tests/labs and diagnostic tests as deemed appropriate and review results as they become available (see orders for details). I have reviewed old psychiatric and medical records available in the EHR. I Will order additional psychiatric records from other institutions to further elucidate the nature of patient's psychopathology and review once available. I will gather additional collateral information from friends, family and o/p treatment team to further elucidate the nature of patient's psychopathology and baselline level of psychiatric functioning.       ESTIMATED LENGTH OF STAY:    3-5 days        STRENGTHS:  Access to housing/residential stability, Knowledge of medications and Motivated and ready for change                                        SIGNED:    Kristy Urbina MD  6/3/2018

## 2018-06-03 NOTE — INTERDISCIPLINARY ROUNDS
Behavioral Health Interdisciplinary Rounds     Patient Name: Lu Coates  Age: 75 y.o.   Room/Bed:  742/01  Primary Diagnosis: Bipolar disorder ([facility] [address]   Admission Status: Involuntary Commitment     Readmission within 30 days: no  Power of  in place: no  Patient requires a blocked bed: yes          Reason for blocked bed: disruptive -talking loudly to self much of the night     VTE Prophylaxis: Not indicated    Mobility needs/Fall risk: yes    Nutritional Plan: no  Consults:        Labs/Testing due today?: no    Sleep hours:  6      Participation in Care/Groups:  no  Medication Compliant?: Yes  PRNS (last 24 hours): Sleep Aid    Restraints (last 24 hours):  no     CIWA (range last 24 hours):     COWS (range last 24 hours):      Alcohol screening (AUDIT) completed -   AUDIT Score: 0     If applicable, date SBIRT discussed in treatment team AND documented:     Tobacco - patient is a smoker: Have You Used Tobacco in the Past 30 Days: No  Illegal Drugs use: Have You Used Any Illegal Substances Over the Past 12 Months: No    24 hour chart check complete: yes     Patient goal(s) for today:   Treatment team focus/goals:   Progress note     LOS:  17  Expected LOS:     Financial concerns/prescription coverage:    Date of last family contact:      Family requesting physician contact today:   Discharge plan:   Guns in the home:       Outpatient provider(s):     Participating treatment team members: Lu Coates, * (assigned SW),

## 2018-06-03 NOTE — PROGRESS NOTES
Problem: Altered Thought Process (Adult/Pediatric)  Goal: *STG: Complies with medication therapy  Outcome: Progressing Towards Goal    1515 Received patient resting in bed. Greeted staff with a smile. NAD. Voiced no complaints. Continues    on q 15 min. Safety checks. Ambulated to DR with walker. Watched TV and listened to music for 30 min. Continues to talk loudly to self. Ate 100% dinner & fluids in Bed room because she is so loud and disruptive in DR. Med and meal compliant. Called  regarding elevated BP. Lisinopril and Lasix ordered and given. See MAR. BP beginning to decrease. Pleasant, polite and cooperative.

## 2018-06-03 NOTE — PROGRESS NOTES
Problem: Falls - Risk of  Goal: *Absence of Falls  Document Jovana Fall Risk and appropriate interventions in the flowsheet.    Outcome: Progressing Towards Goal  Fall Risk Interventions:  Lying quietly in bed with eyes closed , respirations even and unlabored , NAD noted   Q15 min safety rounds continue , walker at bedside   Mobility Interventions: Bed/chair exit alarm    Mentation Interventions: Adequate sleep, hydration, pain control    Medication Interventions: Bed/chair exit alarm

## 2018-06-03 NOTE — PROGRESS NOTES
Problem: Altered Thought Process (Adult/Pediatric)  Goal: *STG: Participates in treatment plan  Outcome: Not Progressing Towards Goal  Pt is cooperative and appropriate with staff and peers   Continues to be hyper verbal   Med/Meal compliant   Walks with a  walker in the hallway   No signs of distress noted   Will continue to monitor.

## 2018-06-04 PROCEDURE — 74011250637 HC RX REV CODE- 250/637: Performed by: PSYCHIATRY & NEUROLOGY

## 2018-06-04 PROCEDURE — 74011250637 HC RX REV CODE- 250/637: Performed by: NURSE PRACTITIONER

## 2018-06-04 PROCEDURE — 74011250636 HC RX REV CODE- 250/636: Performed by: PSYCHIATRY & NEUROLOGY

## 2018-06-04 PROCEDURE — 65220000003 HC RM SEMIPRIVATE PSYCH

## 2018-06-04 RX ADMIN — LISINOPRIL 10 MG: 10 TABLET ORAL at 08:33

## 2018-06-04 RX ADMIN — VALPROIC ACID 500 MG: 250 SOLUTION ORAL at 18:13

## 2018-06-04 RX ADMIN — HALOPERIDOL 3 MG: 2 SOLUTION ORAL at 08:35

## 2018-06-04 RX ADMIN — FUROSEMIDE 20 MG: 20 TABLET ORAL at 08:34

## 2018-06-04 RX ADMIN — STANDARDIZED SENNA CONCENTRATE AND DOCUSATE SODIUM 1 TABLET: 8.6; 5 TABLET, FILM COATED ORAL at 21:00

## 2018-06-04 RX ADMIN — HALOPERIDOL DECANOATE 75 MG: 100 INJECTION INTRAMUSCULAR at 11:21

## 2018-06-04 RX ADMIN — LEVOTHYROXINE SODIUM 25 MCG: 25 TABLET ORAL at 06:32

## 2018-06-04 RX ADMIN — HALOPERIDOL 3 MG: 2 SOLUTION ORAL at 21:00

## 2018-06-04 RX ADMIN — HALOPERIDOL 3 MG: 2 SOLUTION ORAL at 13:51

## 2018-06-04 RX ADMIN — VALPROIC ACID 500 MG: 250 SOLUTION ORAL at 08:32

## 2018-06-04 RX ADMIN — ZOLPIDEM TARTRATE 5 MG: 5 TABLET ORAL at 21:50

## 2018-06-04 RX ADMIN — SIMVASTATIN 20 MG: 20 TABLET, FILM COATED ORAL at 21:00

## 2018-06-04 NOTE — BH NOTES
GROUP THERAPY PROGRESS NOTE    [place] is participating in Leisure-Creative Group.      Group time: 15 minutes    Personal goal for participation: decreased anxiety    Goal orientation: relaxation    Group therapy participation: good    Therapeutic interventions reviewed and discussed:     Impression of participation: good

## 2018-06-04 NOTE — PROGRESS NOTES
Problem: Altered Thought Process (Adult/Pediatric)  Goal: *STG: Participates in treatment plan  Outcome: Progressing Towards Goal  Out on unit social w peers and staff. Mood and affect labile with noted decrease in volume, speed and pressure. Demonstrates ability to follow direction, improved boundaries and ability to show self control. Up out of bed ambulating with improved safety awareness. Compliant with medications. Daily goal is to shower. Staff focus is on limit setting, offering redirection and support    Problem: Falls - Risk of  Goal: *Absence of Falls  Document Jovana Fall Risk and appropriate interventions in the flowsheet.    Outcome: Progressing Towards Goal  Fall Risk Interventions:  Mobility Interventions: Bed/chair exit alarm, Communicate number of staff needed for ambulation/transfer, Patient to call before getting OOB    Mentation Interventions: Adequate sleep, hydration, pain control, Bed/chair exit alarm, More frequent rounding, Reorient patient    Medication Interventions: Bed/chair exit alarm, Patient to call before getting OOB, Teach patient to arise slowly                  Problem: Manic Behavior (Adult/Pediatric)  Goal: *STG: Demonstrates improvement in thought process and speech pattern  Outcome: Progressing Towards Goal  improving  Goal: *STG: Maintains appropriate boundaries  Outcome: Progressing Towards Goal  improving

## 2018-06-04 NOTE — PROGRESS NOTES
Problem: Falls - Risk of  Goal: *Absence of Falls  Document Jovana Fall Risk and appropriate interventions in the flowsheet. Outcome: Progressing Towards Goal  Fall Risk Interventions:  Mobility Interventions: Bed/chair exit alarm, Patient to call before getting OOB  Mentation Interventions: Adequate sleep, hydration, pain control, Door open when patient unattended  Medication Interventions: Bed/chair exit alarm, Teach patient to arise slowly, Patient to call before getting OOB    2315 Pt appears asleep in bed. Respirations even and unlabored. Call bell within reach, door remains open. Will monitor with Q 15 safety checks.

## 2018-06-04 NOTE — BH NOTES
Psychiatric progress note            IDENTIFICATION:    Patient Name  Yomi Penn   Date of Birth 1945   [facility] [id number]   Medical Record Number  [id number]      Age  75 y.o. PCP Don Groves MD   Admit date:  5/17/2018    Room Number  742/01  @ [facility]   Date of Service  6/4/2018            HISTORY         REASON FOR HOSPITALIZATION:  CC: \"agitation, delusions and dario\". Pt admitted under a temporary custodial order (TDO)  For severe psychosis and dario proving to be an imminent danger to self and others and an inability to care for self. HISTORY OF PRESENT ILLNESS:    The patient, Yomi Penn, is a 75 y.o. BLACK OR  female with a past psychiatric history significant for bipolar d/o , who presents at this time with complaints of (and/or evidence of) the following emotional symptoms: agitation, delusions and psychotic behavior. Additional symptomatology include agitation. The above symptoms have been present for 3 days . These symptoms are of severe severity. These symptoms are constant  in nature. The patient's condition has been precipitated by medication adjustments and psychosocial stressors (family conflicts  ). Patient's condition made worse by treatment noncompliance. UDS: MJ negative; BAL=0.     5/19/18 she is doing better and mood is ann marie and no anger issues and no self harm behavior sne she still has mood instability and gets angry and agitated sometimes  5/20/18 she is hyperverbal and agitated and she gets manic and rambling and she has flight of ideas , compliant with medications and no SI or I    5/21/18- Yesterday had some agitation and poor directability. Mkes obscene comments under her breath. Can also be charming, pleasant, and quite cooperative. 5/22/18- Med and meal compliant. Redirectable. Visible in milieu. 5/23/18-Very p;easant with agood nense of humor. Social with peers. Has poor insight that she uses profanity and may offend others. But she is not doing this to be provocative. 5/24/18- Pleasant and cooperative. Med meal and group compliant. No side effects  5/25/18- Well mannered and very pleasant. Occasionally pressured and loud non offensive speech. Mood is good and is redirectable. 5/26/18  Mrs. Teresa Frederick remains labile. Resting comfortably this morning.  5/2718- Very manic, loud, disorganized and intrusive but redirectable  5/28/18 she is really manic , disorganized , screaming and yelling and not sleeping well , receive many prn medications and sounds her high dosage of thorazine is not working , she still paranoid and she spitting on the floor and licking her spit ,  [birth year] she is manic and she is tolerating medications and not showing EPS and she slept fairly last night and less aggressive and no hallucination but has flight of ideas  5/30/18 she still manic and she is having racing thoughts and incoherent speech and she did not sleep well last night and did not respond to thorazine , she has no aggressive behavior and she is not showing SI or HI , still paranoid    5/31/18 she is doing better but still rambling and not sleeping that well , she is not aggressive and not paranoid like before and has been not sleeping well at night   6/1/18-In good spirits with no aggression. Redirectable with no use of obscenities. 6/2/18- pressured speech with tangential and loose associations. Will increase Haldol  6//3/18- continues with pressured and tangential speech with loose associations. Behavior is appropriate. No aggression or agitation. Very pleasant. Will continue to increase meds. 6/4/18- tolerating increase in medication well. Will consider an increase in depakote if she continues to show signs of dario. ALLERGIES: No Known Allergies   MEDICATIONS PRIOR TO ADMISSION:   Prescriptions Prior to Admission   Medication Sig    chlorproMAZINE (THORAZINE) 200 mg tablet Take 200 mg by mouth daily.  With 400mg QPM    chlorproMAZINE (THORAZINE) 200 mg tablet Take 400 mg by mouth every evening.  haloperidol decanoate (HALDOL DECANOATE) 50 mg/mL injection 37.5 mg by IntraMUSCular route every four (4) weeks. Indications: Schizophrenia    divalproex DR (DEPAKOTE) 250 mg tablet Take 250 mg by mouth nightly.  ibuprofen (MOTRIN) 800 mg tablet Take 800 mg by mouth every eight (8) hours as needed for Pain.  simvastatin (ZOCOR) 20 mg tablet Take 20 mg by mouth nightly.  levothyroxine (SYNTHROID) 25 mcg tablet Take 25 mcg by mouth Daily (before breakfast).  melatonin tab tablet Take 10 mg by mouth nightly.  losartan (COZAAR) 50 mg tablet Take 50 mg by mouth daily.  albuterol (PROVENTIL HFA, VENTOLIN HFA, PROAIR HFA) 90 mcg/actuation inhaler Take 1-2 Puffs by inhalation every four (4) hours as needed for Wheezing or Shortness of Breath.  senna-docusate (PERICOLACE) 8.6-50 mg per tablet Take 2 Tabs by mouth two (2) times a day.  amLODIPine (NORVASC) 10 mg tablet Take 10 mg by mouth daily. PAST MEDICAL HISTORY:   Past Medical History:   Diagnosis Date    Asthma     Bipolar 1 disorder ([facility] [address]    No past surgical history on file. SOCIAL HISTORY:    Social History     Social History    Marital status:      Spouse name: N/A    Number of children: N/A    Years of education: N/A     Occupational History    Not on file. Social History Main Topics    Smoking status: Never Smoker    Smokeless tobacco: Not on file    Alcohol use No    Drug use: No    Sexual activity: Not Currently     Other Topics Concern    Not on file     Social History Narrative    75year old AA female admitted on TDO for agitation, making threats and having a fixed delusion that she is . She has been making racial remarks and placing herself in danger. Pt has hx of treatment with Depakote, Thorazine and Haldol Dec.      FAMILY HISTORY:    No family history on file.     REVIEW OF SYSTEMS:   Psychological ROS: positive for - irritability  Respiratory ROS: no cough, shortness of breath, or wheezing  Cardiovascular ROS: no chest pain or dyspnea on exertion  Pertinent items are noted in the History of Present Illness. All other Systems reviewed and are considered negative. MENTAL STATUS EXAM & VITALS     MENTAL STATUS EXAM (MSE):    MSE FINDINGS ARE WITHIN NORMAL LIMITS (WNL) UNLESS OTHERWISE STATED BELOW. ( ALL OF THE BELOW CATEGORIES OF THE MSE HAVE BEEN REVIEWED (reviewed 6/4/2018) AND UPDATED AS DEEMED APPROPRIATE )  General Presentation age appropriate, cooperative   Orientation oriented to time, place and person   Vital Signs  See below (reviewed 6/4/2018); Vital Signs (BP, Pulse, & Temp) are within normal limits if not listed below.    Gait and Station Stable/steady, no ataxia   Musculoskeletal System No extrapyramidal symptoms (EPS); no abnormal muscular movements or Tardive Dyskinesia (TD); muscle strength and tone are within normal limits   Language No aphasia or dysarthria   Speech:  normal pitch and normal volume   Thought Processes concrete; normal rate of thoughts; poor abstract reasoning/computation   Thought Associations goal directed   Thought Content Did not show delusion today    Suicidal Ideations none   Homicidal Ideations none   Mood:  irritable   Affect:  mood-congruent   Memory recent  fair   Memory remote:  fair   Concentration/Attention:  distractable   Fund of Knowledge average   Insight:  limited   Reliability fair   Judgment:  limited          VITALS:     Patient Vitals for the past 24 hrs:   Temp Pulse Resp BP SpO2   06/04/18 1258 97.8 °F (36.6 °C) (!) 106 18 117/80 -   06/04/18 0814 97.8 °F (36.6 °C) 94 18 (!) 155/100 -   06/03/18 1730 97 °F (36.1 °C) 92 16 (!) 178/94 96 %   06/03/18 1715 - - - (!) 177/110 -   06/03/18 1609 98.1 °F (36.7 °C) 97 20 (!) 173/103 97 %     Wt Readings from Last 3 Encounters:   06/03/18 74.1 kg (163 lb 6.4 oz)   01/01/15 83.9 kg (185 lb)     Temp Readings from Last 3 Encounters:   06/04/18 97.8 °F (36.6 °C)   01/01/15 97.8 °F (36.6 °C)   12/27/13 97 °F (36.1 °C)     BP Readings from Last 3 Encounters:   06/04/18 117/80   01/01/15 103/65   12/27/13 118/83     Pulse Readings from Last 3 Encounters:   06/04/18 (!) 106   01/01/15 87   12/27/13 96            DATA     LABORATORY DATA:  Labs Reviewed   METABOLIC PANEL, COMPREHENSIVE - Abnormal; Notable for the following:        Result Value    BUN/Creatinine ratio 21 (*)     Protein, total 6.0 (*)     Albumin 3.0 (*)     A-G Ratio 1.0 (*)     All other components within normal limits   CBC W/O DIFF - Abnormal; Notable for the following:     WBC 3.2 (*)     All other components within normal limits   VALPROIC ACID - Abnormal; Notable for the following:     Valproic acid 44 (*)     All other components within normal limits   HEPATIC FUNCTION PANEL - Abnormal; Notable for the following: Albumin 3.3 (*)     A-G Ratio 0.9 (*)     All other components within normal limits   GLUCOSE, FASTING   TSH 3RD GENERATION   LIPID PANEL   VALPROIC ACID     Admission on 05/17/2018   Component Date Value Ref Range Status    Sodium 05/18/2018 137  136 - 145 mmol/L Final    Potassium 05/18/2018 3.8  3.5 - 5.1 mmol/L Final    Chloride 05/18/2018 102  97 - 108 mmol/L Final    CO2 05/18/2018 28  21 - 32 mmol/L Final    Anion gap 05/18/2018 7  5 - 15 mmol/L Final    Glucose 05/18/2018 81  65 - 100 mg/dL Final    BUN 05/18/2018 12  6 - 20 MG/DL Final    Creatinine 05/18/2018 0.56  0.55 - 1.02 MG/DL Final    BUN/Creatinine ratio 05/18/2018 21* 12 - 20   Final    GFR est AA 05/18/2018 >60  >60 ml/min/1.73m2 Final    GFR est non-AA 05/18/2018 >60  >60 ml/min/1.73m2 Final    Calcium 05/18/2018 8.6  8.5 - 10.1 MG/DL Final    Bilirubin, total 05/18/2018 0.2  0.2 - 1.0 MG/DL Final    ALT (SGPT) 05/18/2018 20  12 - 78 U/L Final    AST (SGOT) 05/18/2018 17  15 - 37 U/L Final    Alk.  phosphatase 05/18/2018 95  45 - 117 U/L Final    Protein, total 05/18/2018 6.0* 6.4 - 8.2 g/dL Final    Albumin 05/18/2018 3.0* 3.5 - 5.0 g/dL Final    Globulin 05/18/2018 3.0  2.0 - 4.0 g/dL Final    A-G Ratio 05/18/2018 1.0* 1.1 - 2.2   Final    Glucose 05/18/2018 81  65 - 100 MG/DL Final    WBC 05/18/2018 3.2* 3.6 - 11.0 K/uL Final    RBC 05/18/2018 4.10  3.80 - 5.20 M/uL Final    HGB 05/18/2018 12.1  11.5 - 16.0 g/dL Final    HCT 05/18/2018 36.3  35.0 - 47.0 % Final    MCV 05/18/2018 88.5  80.0 - 99.0 FL Final    MCH 05/18/2018 29.5  26.0 - 34.0 PG Final    MCHC 05/18/2018 33.3  30.0 - 36.5 g/dL Final    RDW 05/18/2018 13.2  11.5 - 14.5 % Final    PLATELET 86/71/5684 363  150 - 400 K/uL Final    MPV 05/18/2018 9.0  8.9 - 12.9 FL Final    NRBC 05/18/2018 0.0  0  WBC Final    ABSOLUTE NRBC 05/18/2018 0.00  0.00 - 0.01 K/uL Final    TSH 05/18/2018 1.11  0.36 - 3.74 uIU/mL Final    LIPID PROFILE 05/18/2018        Final    Cholesterol, total 05/18/2018 132  <200 MG/DL Final    Triglyceride 05/18/2018 40  <150 MG/DL Final    HDL Cholesterol 05/18/2018 73  MG/DL Final    LDL, calculated 05/18/2018 51  0 - 100 MG/DL Final    VLDL, calculated 05/18/2018 8  MG/DL Final    CHOL/HDL Ratio 05/18/2018 1.8  0 - 5.0   Final    Valproic acid 05/22/2018 44* 50 - 100 ug/ml Final    Valproic acid 06/01/2018 71  50 - 100 ug/ml Final    Protein, total 06/01/2018 6.9  6.4 - 8.2 g/dL Final    Albumin 06/01/2018 3.3* 3.5 - 5.0 g/dL Final    Globulin 06/01/2018 3.6  2.0 - 4.0 g/dL Final    A-G Ratio 06/01/2018 0.9* 1.1 - 2.2   Final    Bilirubin, total 06/01/2018 0.4  0.2 - 1.0 MG/DL Final    Bilirubin, direct 06/01/2018 0.2  0.0 - 0.2 MG/DL Final    Alk. phosphatase 06/01/2018 96  45 - 117 U/L Final    AST (SGOT) 06/01/2018 20  15 - 37 U/L Final    ALT (SGPT) 06/01/2018 22  12 - 78 U/L Final        RADIOLOGY REPORTS:    Results from Hospital Encounter encounter on 05/17/18   XR CHEST PA LAT   Narrative INDICATION:  cough     Exam: Chest 2 views. Comparison: None. Findings: Cardiomediastinal silhouette is normal. Pulmonary vasculature is not  engorged. No focal parenchymal opacities, effusions, or pneumothorax. Bony  thorax is intact. Impression Impression:  1. No acute cardiopulmonary disease      No results found.            MEDICATIONS       ALL MEDICATIONS  Current Facility-Administered Medications   Medication Dose Route Frequency    valproic acid (as sodium salt) (DEPAKENE) 250 mg/5 mL (5 mL) oral solution 500 mg  500 mg Oral BID    haloperidol (HALDOL) 2 mg/mL oral solution 3 mg  3 mg Oral TID    lisinopril (PRINIVIL, ZESTRIL) tablet 10 mg  10 mg Oral DAILY    furosemide (LASIX) tablet 20 mg  20 mg Oral DAILY    haloperidol decanoate (HALDOL DECANOATE) 100 mg/mL injection 75 mg  75 mg IntraMUSCular Q28D    benzonatate (TESSALON) capsule 100 mg  100 mg Oral TID PRN    ibuprofen (MOTRIN) tablet 800 mg  800 mg Oral Q8H PRN    levothyroxine (SYNTHROID) tablet 25 mcg  25 mcg Oral ACB    albuterol (PROVENTIL VENTOLIN) nebulizer solution 2.5 mg  2.5 mg Nebulization Q4H PRN    ziprasidone (GEODON) 10 mg in sterile water (preservative free) 0.5 mL injection  10 mg IntraMUSCular BID PRN    benztropine (COGENTIN) tablet 1 mg  1 mg Oral BID PRN    benztropine (COGENTIN) injection 1 mg  1 mg IntraMUSCular BID PRN    zolpidem (AMBIEN) tablet 5 mg  5 mg Oral QHS PRN    acetaminophen (TYLENOL) tablet 650 mg  650 mg Oral Q4H PRN    magnesium hydroxide (MILK OF MAGNESIA) 400 mg/5 mL oral suspension 30 mL  30 mL Oral DAILY PRN    nicotine (NICODERM CQ) 21 mg/24 hr patch 1 Patch  1 Patch TransDERmal DAILY PRN    senna-docusate (PERICOLACE) 8.6-50 mg per tablet 1 Tab  1 Tab Oral QHS    simvastatin (ZOCOR) tablet 20 mg  20 mg Oral QHS      SCHEDULED MEDICATIONS  Current Facility-Administered Medications   Medication Dose Route Frequency    valproic acid (as sodium salt) (DEPAKENE) 250 mg/5 mL (5 mL) oral solution 500 mg  500 mg Oral BID  haloperidol (HALDOL) 2 mg/mL oral solution 3 mg  3 mg Oral TID    lisinopril (PRINIVIL, ZESTRIL) tablet 10 mg  10 mg Oral DAILY    furosemide (LASIX) tablet 20 mg  20 mg Oral DAILY    haloperidol decanoate (HALDOL DECANOATE) 100 mg/mL injection 75 mg  75 mg IntraMUSCular Q28D    levothyroxine (SYNTHROID) tablet 25 mcg  25 mcg Oral ACB    senna-docusate (PERICOLACE) 8.6-50 mg per tablet 1 Tab  1 Tab Oral QHS    simvastatin (ZOCOR) tablet 20 mg  20 mg Oral QHS                ASSESSMENT & PLAN        The patient, Yadira Rodríguez, is a 75 y.o.  female who presents at this time for treatment of the following diagnoses:  Patient Active Hospital Problem List:   Bipolar disorder ([facility] [address] (5/17/2018)    Assessment: dario alternating with depression     Plan: mood stabilizer/ antipsychotic   5/19/18 continue same medications    5/20/18 continue same medication and check Depakote level tomorrow    5/28/18 will increase her depakene to 250 mg TID , Add Haldol 0.5 mg TID as she is already on Haldol Decanoate but still disorganized and paranoid , will go down on Thorazine and adjust the dosage of Haldol and depakene according to her response , will watch for EPS   5/29/18 will continue decrease Thorazine   5/30/18 D/C Thorazine and increase Haldol to 1 mg po TID   5/31/18 continue same medications           I will continue to monitor blood levels (Depakote, Tegretol, lithium, clozapine---a drug with a narrow therapeutic index= NTI) and associated labs for drug therapy implemented that require intense monitoring for toxicity as deemed appropriate based on current medication side effects and pharmacodynamically determined drug 1/2 lives. A coordinated, multidisplinary treatment team (includes the nurse, unit pharmcist,  and writer) round was conducted for this initial evaluation with the patient present.      The following regarding medications was addressed during rounds with patient: depakote   the risks and benefits of the proposed medication. The patient was given the opportunity to ask questions. Informed consent given to the use of the above medications. I will continue to adjust psychiatric and non-psychiatric medications (see above \"medication\" section and orders section for details) as deemed appropriate & based upon diagnoses and response to treatment. I have reviewed admission (and previous/old) labs and medical tests in the EHR and or transferring hospital documents. I will continue to order blood tests/labs and diagnostic tests as deemed appropriate and review results as they become available (see orders for details). I have reviewed old psychiatric and medical records available in the EHR. I Will order additional psychiatric records from other institutions to further elucidate the nature of patient's psychopathology and review once available. I will gather additional collateral information from friends, family and o/p treatment team to further elucidate the nature of patient's psychopathology and baselline level of psychiatric functioning.       ESTIMATED LENGTH OF STAY:    3-5 days        STRENGTHS:  Access to housing/residential stability, Knowledge of medications and Motivated and ready for change                                        SIGNED:    Kiran Butterfield MD  6/4/2018

## 2018-06-04 NOTE — INTERDISCIPLINARY ROUNDS
Behavioral Health Interdisciplinary Rounds     Patient Name: Bienvenido Snyder  Age: 75 y.o. Room/Bed:  742/01  [facility]   Admission Status: Involuntary Commitment     Readmission within 30 days: no  Power of  in place: no  Patient requires a blocked bed: yes          Reason for blocked bed: disruptive     VTE Prophylaxis: Not indicated    Mobility needs/Fall risk: yes    Nutritional Plan: no  Consults: no         Labs/Testing due today?: no    Sleep hours: 6.75        Participation in Care/Groups:  no  Medication Compliant?: Yes  PRNS (last 24 hours): Tessalon   Restraints (last 24 hours):  no     CIWA (range last 24 hours):     COWS (range last 24 hours):      Alcohol screening (AUDIT) completed -   AUDIT Score: 0     If applicable, date SBIRT discussed in treatment team AND documented:     Tobacco - patient is a smoker: Have You Used Tobacco in the Past 30 Days: No  Illegal Drugs use: Have You Used Any Illegal Substances Over the Past 12 Months: No    24 hour chart check complete: yes     Patient goal(s) for today:   Treatment team focus/goals: Plan to titrate her medications. Progress note - She has been complaint with her mediations and treatment.      LOS:  18  Expected LOS: TBD       Participating treatment team members: Anya Loera Dr. Side -

## 2018-06-04 NOTE — BH NOTES
Psychiatric progress note            IDENTIFICATION:    Patient Name  Claudia Dill   Date of Birth 1945   [facility] [id number]   Medical Record Number  [id number]      Age  75 y.o. PCP Lenin Castaneda MD   Admit date:  5/17/2018    Room Number  742/01  @ [facility]   Date of Service  6/4/2018            HISTORY         REASON FOR HOSPITALIZATION:  CC: \"agitation, delusions and dario\". Pt admitted under a temporary group home order (TDO)  For severe psychosis and dario proving to be an imminent danger to self and others and an inability to care for self. HISTORY OF PRESENT ILLNESS:    The patient, Claudia Dill, is a 75 y.o. BLACK OR  female with a past psychiatric history significant for bipolar d/o , who presents at this time with complaints of (and/or evidence of) the following emotional symptoms: agitation, delusions and psychotic behavior. Additional symptomatology include agitation. The above symptoms have been present for 3 days . These symptoms are of severe severity. These symptoms are constant  in nature. The patient's condition has been precipitated by medication adjustments and psychosocial stressors (family conflicts  ). Patient's condition made worse by treatment noncompliance. UDS: MJ negative; BAL=0.     5/19/18 she is doing better and mood is ann marie and no anger issues and no self harm behavior sne she still has mood instability and gets angry and agitated sometimes  5/20/18 she is hyperverbal and agitated and she gets manic and rambling and she has flight of ideas , compliant with medications and no SI or I    5/21/18- Yesterday had some agitation and poor directability. Mkes obscene comments under her breath. Can also be charming, pleasant, and quite cooperative. 5/22/18- Med and meal compliant. Redirectable. Visible in milieu. 5/23/18-Very p;easant with maggie correa of humor. Social with peers. Has poor insight that she uses profanity and may offend others. But she is not doing this to be provocative. 5/24/18- Pleasant and cooperative. Med meal and group compliant. No side effects  5/25/18- Well mannered and very pleasant. Occasionally pressured and loud non offensive speech. Mood is good and is redirectable. 5/26/18  Mrs. Kayli Holt remains labile. Resting comfortably this morning.  5/2718- Very manic, loud, disorganized and intrusive but redirectable  5/28/18 she is really manic , disorganized , screaming and yelling and not sleeping well , receive many prn medications and sounds her high dosage of thorazine is not working , she still paranoid and she spitting on the floor and licking her spit ,  8/28/85 she is manic and she is tolerating medications and not showing EPS and she slept fairly last night and less aggressive and no hallucination but has flight of ideas  5/30/18 she still manic and she is having racing thoughts and incoherent speech and she did not sleep well last night and did not respond to thorazine , she has no aggressive behavior and she is not showing SI or HI , still paranoid    5/31/18 she is doing better but still rambling and not sleeping that well , she is not aggressive and not paranoid like before and has been not sleeping well at night   6/1/18-In good spirits with no aggression. Redirectable with no use of obscenities. 6/2/18- pressured speech with tangential and loose associations. Will increase Haldol  6//3/18- continues with pressured and tangential speech with loose associations. Behavior is appropriate. No aggression or agitation. Very pleasant. Will continue to increase meds. 6/4/18- tolerating increase in medication well. Will consider an increase in depakote if she continues to show signs of dario. ALLERGIES: No Known Allergies   MEDICATIONS PRIOR TO ADMISSION:   Prescriptions Prior to Admission   Medication Sig    chlorproMAZINE (THORAZINE) 200 mg tablet Take 200 mg by mouth daily.  With 400mg QPM    chlorproMAZINE (THORAZINE) 200 mg tablet Take 400 mg by mouth every evening.  haloperidol decanoate (HALDOL DECANOATE) 50 mg/mL injection 37.5 mg by IntraMUSCular route every four (4) weeks. Indications: Schizophrenia    divalproex DR (DEPAKOTE) 250 mg tablet Take 250 mg by mouth nightly.  ibuprofen (MOTRIN) 800 mg tablet Take 800 mg by mouth every eight (8) hours as needed for Pain.  simvastatin (ZOCOR) 20 mg tablet Take 20 mg by mouth nightly.  levothyroxine (SYNTHROID) 25 mcg tablet Take 25 mcg by mouth Daily (before breakfast).  melatonin tab tablet Take 10 mg by mouth nightly.  losartan (COZAAR) 50 mg tablet Take 50 mg by mouth daily.  albuterol (PROVENTIL HFA, VENTOLIN HFA, PROAIR HFA) 90 mcg/actuation inhaler Take 1-2 Puffs by inhalation every four (4) hours as needed for Wheezing or Shortness of Breath.  senna-docusate (PERICOLACE) 8.6-50 mg per tablet Take 2 Tabs by mouth two (2) times a day.  amLODIPine (NORVASC) 10 mg tablet Take 10 mg by mouth daily. PAST MEDICAL HISTORY:   Past Medical History:   Diagnosis Date    Asthma     Bipolar 1 disorder ([facility] [address]    No past surgical history on file. SOCIAL HISTORY:    Social History     Social History    Marital status:      Spouse name: N/A    Number of children: N/A    Years of education: N/A     Occupational History    Not on file. Social History Main Topics    Smoking status: Never Smoker    Smokeless tobacco: Not on file    Alcohol use No    Drug use: No    Sexual activity: Not Currently     Other Topics Concern    Not on file     Social History Narrative    75year old AA female admitted on TDO for agitation, making threats and having a fixed delusion that she is . She has been making racial remarks and placing herself in danger. Pt has hx of treatment with Depakote, Thorazine and Haldol Dec.      FAMILY HISTORY:    No family history on file.     REVIEW OF SYSTEMS:   Psychological ROS: positive for - irritability  Respiratory ROS: no cough, shortness of breath, or wheezing  Cardiovascular ROS: no chest pain or dyspnea on exertion  Pertinent items are noted in the History of Present Illness. All other Systems reviewed and are considered negative. MENTAL STATUS EXAM & VITALS     MENTAL STATUS EXAM (MSE):    MSE FINDINGS ARE WITHIN NORMAL LIMITS (WNL) UNLESS OTHERWISE STATED BELOW. ( ALL OF THE BELOW CATEGORIES OF THE MSE HAVE BEEN REVIEWED (reviewed 6/4/2018) AND UPDATED AS DEEMED APPROPRIATE )  General Presentation age appropriate, cooperative   Orientation oriented to time, place and person   Vital Signs  See below (reviewed 6/4/2018); Vital Signs (BP, Pulse, & Temp) are within normal limits if not listed below.    Gait and Station Stable/steady, no ataxia   Musculoskeletal System No extrapyramidal symptoms (EPS); no abnormal muscular movements or Tardive Dyskinesia (TD); muscle strength and tone are within normal limits   Language No aphasia or dysarthria   Speech:  normal pitch and normal volume   Thought Processes concrete; normal rate of thoughts; poor abstract reasoning/computation   Thought Associations goal directed   Thought Content Did not show delusion today    Suicidal Ideations none   Homicidal Ideations none   Mood:  irritable   Affect:  mood-congruent   Memory recent  fair   Memory remote:  fair   Concentration/Attention:  distractable   Fund of Knowledge average   Insight:  limited   Reliability fair   Judgment:  limited          VITALS:     Patient Vitals for the past 24 hrs:   Temp Pulse Resp BP SpO2   06/04/18 0814 97.8 °F (36.6 °C) 94 18 (!) 155/100 -   06/03/18 1730 97 °F (36.1 °C) 92 16 (!) 178/94 96 %   06/03/18 1715 - - - (!) 177/110 -   06/03/18 1609 98.1 °F (36.7 °C) 97 20 (!) 173/103 97 %     Wt Readings from Last 3 Encounters:   06/03/18 74.1 kg (163 lb 6.4 oz)   01/01/15 83.9 kg (185 lb)     Temp Readings from Last 3 Encounters:   06/04/18 97.8 °F (36.6 °C)   01/01/15 97.8 °F (36.6 °C)   12/27/13 97 °F (36.1 °C)     BP Readings from Last 3 Encounters:   06/04/18 (!) 155/100   01/01/15 103/65   12/27/13 118/83     Pulse Readings from Last 3 Encounters:   06/04/18 94   01/01/15 87   12/27/13 96            DATA     LABORATORY DATA:  Labs Reviewed   METABOLIC PANEL, COMPREHENSIVE - Abnormal; Notable for the following:        Result Value    BUN/Creatinine ratio 21 (*)     Protein, total 6.0 (*)     Albumin 3.0 (*)     A-G Ratio 1.0 (*)     All other components within normal limits   CBC W/O DIFF - Abnormal; Notable for the following:     WBC 3.2 (*)     All other components within normal limits   VALPROIC ACID - Abnormal; Notable for the following:     Valproic acid 44 (*)     All other components within normal limits   HEPATIC FUNCTION PANEL - Abnormal; Notable for the following: Albumin 3.3 (*)     A-G Ratio 0.9 (*)     All other components within normal limits   GLUCOSE, FASTING   TSH 3RD GENERATION   LIPID PANEL   VALPROIC ACID     Admission on 05/17/2018   Component Date Value Ref Range Status    Sodium 05/18/2018 137  136 - 145 mmol/L Final    Potassium 05/18/2018 3.8  3.5 - 5.1 mmol/L Final    Chloride 05/18/2018 102  97 - 108 mmol/L Final    CO2 05/18/2018 28  21 - 32 mmol/L Final    Anion gap 05/18/2018 7  5 - 15 mmol/L Final    Glucose 05/18/2018 81  65 - 100 mg/dL Final    BUN 05/18/2018 12  6 - 20 MG/DL Final    Creatinine 05/18/2018 0.56  0.55 - 1.02 MG/DL Final    BUN/Creatinine ratio 05/18/2018 21* 12 - 20   Final    GFR est AA 05/18/2018 >60  >60 ml/min/1.73m2 Final    GFR est non-AA 05/18/2018 >60  >60 ml/min/1.73m2 Final    Calcium 05/18/2018 8.6  8.5 - 10.1 MG/DL Final    Bilirubin, total 05/18/2018 0.2  0.2 - 1.0 MG/DL Final    ALT (SGPT) 05/18/2018 20  12 - 78 U/L Final    AST (SGOT) 05/18/2018 17  15 - 37 U/L Final    Alk.  phosphatase 05/18/2018 95  45 - 117 U/L Final    Protein, total 05/18/2018 6.0* 6.4 - 8.2 g/dL Final    Albumin 05/18/2018 3. 0* 3.5 - 5.0 g/dL Final    Globulin 05/18/2018 3.0  2.0 - 4.0 g/dL Final    A-G Ratio 05/18/2018 1.0* 1.1 - 2.2   Final    Glucose 05/18/2018 81  65 - 100 MG/DL Final    WBC 05/18/2018 3.2* 3.6 - 11.0 K/uL Final    RBC 05/18/2018 4.10  3.80 - 5.20 M/uL Final    HGB 05/18/2018 12.1  11.5 - 16.0 g/dL Final    HCT 05/18/2018 36.3  35.0 - 47.0 % Final    MCV 05/18/2018 88.5  80.0 - 99.0 FL Final    MCH 05/18/2018 29.5  26.0 - 34.0 PG Final    MCHC 05/18/2018 33.3  30.0 - 36.5 g/dL Final    RDW 05/18/2018 13.2  11.5 - 14.5 % Final    PLATELET 51/01/7039 914  150 - 400 K/uL Final    MPV 05/18/2018 9.0  8.9 - 12.9 FL Final    NRBC 05/18/2018 0.0  0  WBC Final    ABSOLUTE NRBC 05/18/2018 0.00  0.00 - 0.01 K/uL Final    TSH 05/18/2018 1.11  0.36 - 3.74 uIU/mL Final    LIPID PROFILE 05/18/2018        Final    Cholesterol, total 05/18/2018 132  <200 MG/DL Final    Triglyceride 05/18/2018 40  <150 MG/DL Final    HDL Cholesterol 05/18/2018 73  MG/DL Final    LDL, calculated 05/18/2018 51  0 - 100 MG/DL Final    VLDL, calculated 05/18/2018 8  MG/DL Final    CHOL/HDL Ratio 05/18/2018 1.8  0 - 5.0   Final    Valproic acid 05/22/2018 44* 50 - 100 ug/ml Final    Valproic acid 06/01/2018 71  50 - 100 ug/ml Final    Protein, total 06/01/2018 6.9  6.4 - 8.2 g/dL Final    Albumin 06/01/2018 3.3* 3.5 - 5.0 g/dL Final    Globulin 06/01/2018 3.6  2.0 - 4.0 g/dL Final    A-G Ratio 06/01/2018 0.9* 1.1 - 2.2   Final    Bilirubin, total 06/01/2018 0.4  0.2 - 1.0 MG/DL Final    Bilirubin, direct 06/01/2018 0.2  0.0 - 0.2 MG/DL Final    Alk. phosphatase 06/01/2018 96  45 - 117 U/L Final    AST (SGOT) 06/01/2018 20  15 - 37 U/L Final    ALT (SGPT) 06/01/2018 22  12 - 78 U/L Final        RADIOLOGY REPORTS:    Results from Hospital Encounter encounter on 05/17/18   XR CHEST PA LAT   Narrative INDICATION:  cough     Exam: Chest 2 views. Comparison: None.      Findings: Cardiomediastinal silhouette is normal. Pulmonary vasculature is not  engorged. No focal parenchymal opacities, effusions, or pneumothorax. Bony  thorax is intact. Impression Impression:  1. No acute cardiopulmonary disease      No results found.            MEDICATIONS       ALL MEDICATIONS  Current Facility-Administered Medications   Medication Dose Route Frequency    valproic acid (as sodium salt) (DEPAKENE) 250 mg/5 mL (5 mL) oral solution 500 mg  500 mg Oral BID    haloperidol (HALDOL) 2 mg/mL oral solution 3 mg  3 mg Oral TID    lisinopril (PRINIVIL, ZESTRIL) tablet 10 mg  10 mg Oral DAILY    furosemide (LASIX) tablet 20 mg  20 mg Oral DAILY    haloperidol decanoate (HALDOL DECANOATE) 100 mg/mL injection 75 mg  75 mg IntraMUSCular Q28D    benzonatate (TESSALON) capsule 100 mg  100 mg Oral TID PRN    ibuprofen (MOTRIN) tablet 800 mg  800 mg Oral Q8H PRN    levothyroxine (SYNTHROID) tablet 25 mcg  25 mcg Oral ACB    albuterol (PROVENTIL VENTOLIN) nebulizer solution 2.5 mg  2.5 mg Nebulization Q4H PRN    ziprasidone (GEODON) 10 mg in sterile water (preservative free) 0.5 mL injection  10 mg IntraMUSCular BID PRN    benztropine (COGENTIN) tablet 1 mg  1 mg Oral BID PRN    benztropine (COGENTIN) injection 1 mg  1 mg IntraMUSCular BID PRN    zolpidem (AMBIEN) tablet 5 mg  5 mg Oral QHS PRN    acetaminophen (TYLENOL) tablet 650 mg  650 mg Oral Q4H PRN    magnesium hydroxide (MILK OF MAGNESIA) 400 mg/5 mL oral suspension 30 mL  30 mL Oral DAILY PRN    nicotine (NICODERM CQ) 21 mg/24 hr patch 1 Patch  1 Patch TransDERmal DAILY PRN    senna-docusate (PERICOLACE) 8.6-50 mg per tablet 1 Tab  1 Tab Oral QHS    simvastatin (ZOCOR) tablet 20 mg  20 mg Oral QHS      SCHEDULED MEDICATIONS  Current Facility-Administered Medications   Medication Dose Route Frequency    valproic acid (as sodium salt) (DEPAKENE) 250 mg/5 mL (5 mL) oral solution 500 mg  500 mg Oral BID    haloperidol (HALDOL) 2 mg/mL oral solution 3 mg  3 mg Oral TID    lisinopril (PRINIVIL, ZESTRIL) tablet 10 mg  10 mg Oral DAILY    furosemide (LASIX) tablet 20 mg  20 mg Oral DAILY    haloperidol decanoate (HALDOL DECANOATE) 100 mg/mL injection 75 mg  75 mg IntraMUSCular Q28D    levothyroxine (SYNTHROID) tablet 25 mcg  25 mcg Oral ACB    senna-docusate (PERICOLACE) 8.6-50 mg per tablet 1 Tab  1 Tab Oral QHS    simvastatin (ZOCOR) tablet 20 mg  20 mg Oral QHS                ASSESSMENT & PLAN        The patient, Flores Victoria, is a 75 y.o.  female who presents at this time for treatment of the following diagnoses:  Patient Active Hospital Problem List:   Bipolar disorder ([facility] [address] (5/17/2018)    Assessment: dario alternating with depression     Plan: mood stabilizer/ antipsychotic   5/19/18 continue same medications    5/20/18 continue same medication and check Depakote level tomorrow    5/28/18 will increase her depakene to 250 mg TID , Add Haldol 0.5 mg TID as she is already on Haldol Decanoate but still disorganized and paranoid , will go down on Thorazine and adjust the dosage of Haldol and depakene according to her response , will watch for EPS   5/29/18 will continue decrease Thorazine   5/30/18 D/C Thorazine and increase Haldol to 1 mg po TID   5/31/18 continue same medications           I will continue to monitor blood levels (Depakote, Tegretol, lithium, clozapine---a drug with a narrow therapeutic index= NTI) and associated labs for drug therapy implemented that require intense monitoring for toxicity as deemed appropriate based on current medication side effects and pharmacodynamically determined drug 1/2 lives. A coordinated, multidisplinary treatment team (includes the nurse, unit pharmcist,  and writer) round was conducted for this initial evaluation with the patient present. The following regarding medications was addressed during rounds with patient: depakote   the risks and benefits of the proposed medication.  The patient was given the opportunity to ask questions. Informed consent given to the use of the above medications. I will continue to adjust psychiatric and non-psychiatric medications (see above \"medication\" section and orders section for details) as deemed appropriate & based upon diagnoses and response to treatment. I have reviewed admission (and previous/old) labs and medical tests in the EHR and or transferring hospital documents. I will continue to order blood tests/labs and diagnostic tests as deemed appropriate and review results as they become available (see orders for details). I have reviewed old psychiatric and medical records available in the EHR. I Will order additional psychiatric records from other institutions to further elucidate the nature of patient's psychopathology and review once available. I will gather additional collateral information from friends, family and o/p treatment team to further elucidate the nature of patient's psychopathology and baselline level of psychiatric functioning.       ESTIMATED LENGTH OF STAY:    3-5 days        STRENGTHS:  Access to housing/residential stability, Knowledge of medications and Motivated and ready for change                                        SIGNED:    Yulisa Gilbert MD  6/4/2018

## 2018-06-04 NOTE — PROGRESS NOTES
Problem: Altered Thought Process (Adult/Pediatric)  Goal: *STG: Participates in treatment plan  Outcome: Progressing Towards Goal  Pt was in bed quiet at the start of shift. Pt talkative with staff but smiling  Pt came out for dinner. Pt went back to her room after dinner. Pt compliant with meal and meds.

## 2018-06-04 NOTE — BH NOTES
GROUP THERAPY PROGRESS NOTE    Kayla Street participated in a morning Process Group on the Geriatric Unit, with a focus identifying feelings, planning for the day, and singing. Group time: 45 minutes. Personal goal for participation: To increase the capacity to shift ones mood, prepare for the day, and share in group singing. Goal orientation: The patient will be able to prepare for the day through group singing. Group therapy participation: When prompted, this patient participated in the group. Therapeutic interventions reviewed and discussed: The group members were introduce themselves by first names and participate in group singing as a way to increase their oxygen and blood flow and begin their day on a positive note. They were also asked to join in singing several songs. Impression of participation: The patient said she was feeling \"great\" and that she was looking forward to the music. She sang along with the verses she was familiar with. When she did speak she spoke in a garbled and rapid pace. Her affect was mildly euphoric and possibly manic. Her mood matched her affect. She expressed no current SI/HI and displayed no overt psychosis.

## 2018-06-05 PROCEDURE — 65220000003 HC RM SEMIPRIVATE PSYCH

## 2018-06-05 PROCEDURE — 74011250637 HC RX REV CODE- 250/637: Performed by: PSYCHIATRY & NEUROLOGY

## 2018-06-05 PROCEDURE — 74011250637 HC RX REV CODE- 250/637: Performed by: NURSE PRACTITIONER

## 2018-06-05 RX ADMIN — FUROSEMIDE 20 MG: 20 TABLET ORAL at 08:19

## 2018-06-05 RX ADMIN — HALOPERIDOL 3 MG: 2 SOLUTION ORAL at 21:00

## 2018-06-05 RX ADMIN — VALPROIC ACID 500 MG: 250 SOLUTION ORAL at 17:00

## 2018-06-05 RX ADMIN — ZOLPIDEM TARTRATE 5 MG: 5 TABLET ORAL at 21:48

## 2018-06-05 RX ADMIN — VALPROIC ACID 500 MG: 250 SOLUTION ORAL at 08:19

## 2018-06-05 RX ADMIN — SIMVASTATIN 20 MG: 20 TABLET, FILM COATED ORAL at 21:00

## 2018-06-05 RX ADMIN — STANDARDIZED SENNA CONCENTRATE AND DOCUSATE SODIUM 1 TABLET: 8.6; 5 TABLET, FILM COATED ORAL at 20:47

## 2018-06-05 RX ADMIN — HALOPERIDOL 3 MG: 2 SOLUTION ORAL at 14:32

## 2018-06-05 RX ADMIN — LISINOPRIL 10 MG: 10 TABLET ORAL at 08:19

## 2018-06-05 RX ADMIN — HALOPERIDOL 3 MG: 2 SOLUTION ORAL at 08:18

## 2018-06-05 RX ADMIN — LEVOTHYROXINE SODIUM 25 MCG: 25 TABLET ORAL at 08:19

## 2018-06-05 NOTE — BH NOTES
Psychiatric progress note            IDENTIFICATION:    Patient Name  Andrew Stallings   Date of Birth 1945   [facility] [id number]   Medical Record Number  [id number]      Age  75 y.o. PCP Yessi Pantoja MD   Admit date:  5/17/2018    Room Number  742/01  @ [facility]   Date of Service  6/5/2018            HISTORY         REASON FOR HOSPITALIZATION:  CC: \"agitation, delusions and dario\". Pt admitted under a temporary nursing home order (TDO)  For severe psychosis and dario proving to be an imminent danger to self and others and an inability to care for self. HISTORY OF PRESENT ILLNESS:    The patient, Andrew Stallings, is a 75 y.o. BLACK OR  female with a past psychiatric history significant for bipolar d/o , who presents at this time with complaints of (and/or evidence of) the following emotional symptoms: agitation, delusions and psychotic behavior. Additional symptomatology include agitation. The above symptoms have been present for 3 days . These symptoms are of severe severity. These symptoms are constant  in nature. The patient's condition has been precipitated by medication adjustments and psychosocial stressors (family conflicts  ). Patient's condition made worse by treatment noncompliance. UDS: MJ negative; BAL=0.     5/19/18 she is doing better and mood is ann marie and no anger issues and no self harm behavior sne she still has mood instability and gets angry and agitated sometimes  5/20/18 she is hyperverbal and agitated and she gets manic and rambling and she has flight of ideas , compliant with medications and no SI or I    5/21/18- Yesterday had some agitation and poor directability. Mkes obscene comments under her breath. Can also be charming, pleasant, and quite cooperative. 5/22/18- Med and meal compliant. Redirectable. Visible in milieu. 5/23/18-Very p;easant with agood brene of humor. Social with peers. Has poor insight that she uses profanity and may offend others. But she is not doing this to be provocative. 5/24/18- Pleasant and cooperative. Med meal and group compliant. No side effects  5/25/18- Well mannered and very pleasant. Occasionally pressured and loud non offensive speech. Mood is good and is redirectable. 5/26/18  Mrs. Perico Riggs remains labile. Resting comfortably this morning.  5/2718- Very manic, loud, disorganized and intrusive but redirectable  5/28/18 she is really manic , disorganized , screaming and yelling and not sleeping well , receive many prn medications and sounds her high dosage of thorazine is not working , she still paranoid and she spitting on the floor and licking her spit ,  5/62/85 she is manic and she is tolerating medications and not showing EPS and she slept fairly last night and less aggressive and no hallucination but has flight of ideas  5/30/18 she still manic and she is having racing thoughts and incoherent speech and she did not sleep well last night and did not respond to thorazine , she has no aggressive behavior and she is not showing SI or HI , still paranoid    5/31/18 she is doing better but still rambling and not sleeping that well , she is not aggressive and not paranoid like before and has been not sleeping well at night   6/1/18-In good spirits with no aggression. Redirectable with no use of obscenities. 6/2/18- pressured speech with tangential and loose associations. Will increase Haldol  6//3/18- continues with pressured and tangential speech with loose associations. Behavior is appropriate. No aggression or agitation. Very pleasant. Will continue to increase meds. 6/4/18- tolerating increase in medication well. Will consider an increase in depakote if she continues to show signs of dario. 6/5/18-Less intrusive and interruptable. Pleasant and appropriate. Valproate= 71. Sleeping well, med compliant.            ALLERGIES: No Known Allergies   MEDICATIONS PRIOR TO ADMISSION:   Prescriptions Prior to Admission   Medication Sig  chlorproMAZINE (THORAZINE) 200 mg tablet Take 200 mg by mouth daily. With 400mg QPM    chlorproMAZINE (THORAZINE) 200 mg tablet Take 400 mg by mouth every evening.  haloperidol decanoate (HALDOL DECANOATE) 50 mg/mL injection 37.5 mg by IntraMUSCular route every four (4) weeks. Indications: Schizophrenia    divalproex DR (DEPAKOTE) 250 mg tablet Take 250 mg by mouth nightly.  ibuprofen (MOTRIN) 800 mg tablet Take 800 mg by mouth every eight (8) hours as needed for Pain.  simvastatin (ZOCOR) 20 mg tablet Take 20 mg by mouth nightly.  levothyroxine (SYNTHROID) 25 mcg tablet Take 25 mcg by mouth Daily (before breakfast).  melatonin tab tablet Take 10 mg by mouth nightly.  losartan (COZAAR) 50 mg tablet Take 50 mg by mouth daily.  albuterol (PROVENTIL HFA, VENTOLIN HFA, PROAIR HFA) 90 mcg/actuation inhaler Take 1-2 Puffs by inhalation every four (4) hours as needed for Wheezing or Shortness of Breath.  senna-docusate (PERICOLACE) 8.6-50 mg per tablet Take 2 Tabs by mouth two (2) times a day.  amLODIPine (NORVASC) 10 mg tablet Take 10 mg by mouth daily. PAST MEDICAL HISTORY:   Past Medical History:   Diagnosis Date    Asthma     Bipolar 1 disorder ([facility] [address]    No past surgical history on file. SOCIAL HISTORY:    Social History     Social History    Marital status:      Spouse name: N/A    Number of children: N/A    Years of education: N/A     Occupational History    Not on file. Social History Main Topics    Smoking status: Never Smoker    Smokeless tobacco: Not on file    Alcohol use No    Drug use: No    Sexual activity: Not Currently     Other Topics Concern    Not on file     Social History Narrative    75year old AA female admitted on TDO for agitation, making threats and having a fixed delusion that she is . She has been making racial remarks and placing herself in danger.  Pt has hx of treatment with Depakote, Thorazine and Haldol Dec.      FAMILY HISTORY:    No family history on file. REVIEW OF SYSTEMS:   Psychological ROS: positive for - irritability  Respiratory ROS: no cough, shortness of breath, or wheezing  Cardiovascular ROS: no chest pain or dyspnea on exertion  Pertinent items are noted in the History of Present Illness. All other Systems reviewed and are considered negative. MENTAL STATUS EXAM & VITALS     MENTAL STATUS EXAM (MSE):    MSE FINDINGS ARE WITHIN NORMAL LIMITS (WNL) UNLESS OTHERWISE STATED BELOW. ( ALL OF THE BELOW CATEGORIES OF THE MSE HAVE BEEN REVIEWED (reviewed 6/5/2018) AND UPDATED AS DEEMED APPROPRIATE )  General Presentation age appropriate, cooperative   Orientation oriented to time, place and person   Vital Signs  See below (reviewed 6/5/2018); Vital Signs (BP, Pulse, & Temp) are within normal limits if not listed below.    Gait and Station Stable/steady, no ataxia   Musculoskeletal System No extrapyramidal symptoms (EPS); no abnormal muscular movements or Tardive Dyskinesia (TD); muscle strength and tone are within normal limits   Language No aphasia or dysarthria   Speech:  normal pitch and normal volume   Thought Processes concrete; normal rate of thoughts; poor abstract reasoning/computation   Thought Associations goal directed   Thought Content Did not show delusion today    Suicidal Ideations none   Homicidal Ideations none   Mood:  irritable   Affect:  mood-congruent   Memory recent  fair   Memory remote:  fair   Concentration/Attention:  distractable   Fund of Knowledge average   Insight:  limited   Reliability fair   Judgment:  limited          VITALS:     Patient Vitals for the past 24 hrs:   Temp Pulse Resp BP SpO2   06/05/18 1140 - - - (!) 144/98 -   06/05/18 1130 98.9 °F (37.2 °C) 88 16 (!) 169/95 97 %   06/05/18 0757 98.4 °F (36.9 °C) 70 18 (!) 156/111 97 %   06/04/18 1913 98.3 °F (36.8 °C) 85 20 (!) 167/99 98 %   06/04/18 1635 97.7 °F (36.5 °C) 87 16 (!) 142/106 100 %     Wt Readings from Last 3 Encounters:   06/03/18 74.1 kg (163 lb 6.4 oz)   01/01/15 83.9 kg (185 lb)     Temp Readings from Last 3 Encounters:   06/05/18 98.9 °F (37.2 °C)   01/01/15 97.8 °F (36.6 °C)   12/27/13 97 °F (36.1 °C)     BP Readings from Last 3 Encounters:   06/05/18 (!) 144/98   01/01/15 103/65   12/27/13 118/83     Pulse Readings from Last 3 Encounters:   06/05/18 88   01/01/15 87   12/27/13 96            DATA     LABORATORY DATA:  Labs Reviewed   METABOLIC PANEL, COMPREHENSIVE - Abnormal; Notable for the following:        Result Value    BUN/Creatinine ratio 21 (*)     Protein, total 6.0 (*)     Albumin 3.0 (*)     A-G Ratio 1.0 (*)     All other components within normal limits   CBC W/O DIFF - Abnormal; Notable for the following:     WBC 3.2 (*)     All other components within normal limits   VALPROIC ACID - Abnormal; Notable for the following:     Valproic acid 44 (*)     All other components within normal limits   HEPATIC FUNCTION PANEL - Abnormal; Notable for the following:      Albumin 3.3 (*)     A-G Ratio 0.9 (*)     All other components within normal limits   GLUCOSE, FASTING   TSH 3RD GENERATION   LIPID PANEL   VALPROIC ACID     Admission on 05/17/2018   Component Date Value Ref Range Status    Sodium 05/18/2018 137  136 - 145 mmol/L Final    Potassium 05/18/2018 3.8  3.5 - 5.1 mmol/L Final    Chloride 05/18/2018 102  97 - 108 mmol/L Final    CO2 05/18/2018 28  21 - 32 mmol/L Final    Anion gap 05/18/2018 7  5 - 15 mmol/L Final    Glucose 05/18/2018 81  65 - 100 mg/dL Final    BUN 05/18/2018 12  6 - 20 MG/DL Final    Creatinine 05/18/2018 0.56  0.55 - 1.02 MG/DL Final    BUN/Creatinine ratio 05/18/2018 21* 12 - 20   Final    GFR est AA 05/18/2018 >60  >60 ml/min/1.73m2 Final    GFR est non-AA 05/18/2018 >60  >60 ml/min/1.73m2 Final    Calcium 05/18/2018 8.6  8.5 - 10.1 MG/DL Final    Bilirubin, total 05/18/2018 0.2  0.2 - 1.0 MG/DL Final    ALT (SGPT) 05/18/2018 20  12 - 78 U/L Final    AST (SGOT) 05/18/2018 17  15 - 37 U/L Final    Alk. phosphatase 05/18/2018 95  45 - 117 U/L Final    Protein, total 05/18/2018 6.0* 6.4 - 8.2 g/dL Final    Albumin 05/18/2018 3.0* 3.5 - 5.0 g/dL Final    Globulin 05/18/2018 3.0  2.0 - 4.0 g/dL Final    A-G Ratio 05/18/2018 1.0* 1.1 - 2.2   Final    Glucose 05/18/2018 81  65 - 100 MG/DL Final    WBC 05/18/2018 3.2* 3.6 - 11.0 K/uL Final    RBC 05/18/2018 4.10  3.80 - 5.20 M/uL Final    HGB 05/18/2018 12.1  11.5 - 16.0 g/dL Final    HCT 05/18/2018 36.3  35.0 - 47.0 % Final    MCV 05/18/2018 88.5  80.0 - 99.0 FL Final    MCH 05/18/2018 29.5  26.0 - 34.0 PG Final    MCHC 05/18/2018 33.3  30.0 - 36.5 g/dL Final    RDW 05/18/2018 13.2  11.5 - 14.5 % Final    PLATELET 10/62/1708 735  150 - 400 K/uL Final    MPV 05/18/2018 9.0  8.9 - 12.9 FL Final    NRBC 05/18/2018 0.0  0  WBC Final    ABSOLUTE NRBC 05/18/2018 0.00  0.00 - 0.01 K/uL Final    TSH 05/18/2018 1.11  0.36 - 3.74 uIU/mL Final    LIPID PROFILE 05/18/2018        Final    Cholesterol, total 05/18/2018 132  <200 MG/DL Final    Triglyceride 05/18/2018 40  <150 MG/DL Final    HDL Cholesterol 05/18/2018 73  MG/DL Final    LDL, calculated 05/18/2018 51  0 - 100 MG/DL Final    VLDL, calculated 05/18/2018 8  MG/DL Final    CHOL/HDL Ratio 05/18/2018 1.8  0 - 5.0   Final    Valproic acid 05/22/2018 44* 50 - 100 ug/ml Final    Valproic acid 06/01/2018 71  50 - 100 ug/ml Final    Protein, total 06/01/2018 6.9  6.4 - 8.2 g/dL Final    Albumin 06/01/2018 3.3* 3.5 - 5.0 g/dL Final    Globulin 06/01/2018 3.6  2.0 - 4.0 g/dL Final    A-G Ratio 06/01/2018 0.9* 1.1 - 2.2   Final    Bilirubin, total 06/01/2018 0.4  0.2 - 1.0 MG/DL Final    Bilirubin, direct 06/01/2018 0.2  0.0 - 0.2 MG/DL Final    Alk.  phosphatase 06/01/2018 96  45 - 117 U/L Final    AST (SGOT) 06/01/2018 20  15 - 37 U/L Final    ALT (SGPT) 06/01/2018 22  12 - 78 U/L Final        RADIOLOGY REPORTS:    Results from [facility] Encounter encounter on 05/17/18   XR CHEST PA LAT   Narrative INDICATION:  cough     Exam: Chest 2 views. Comparison: None. Findings: Cardiomediastinal silhouette is normal. Pulmonary vasculature is not  engorged. No focal parenchymal opacities, effusions, or pneumothorax. Bony  thorax is intact. Impression Impression:  1. No acute cardiopulmonary disease      No results found.            MEDICATIONS       ALL MEDICATIONS  Current Facility-Administered Medications   Medication Dose Route Frequency    valproic acid (as sodium salt) (DEPAKENE) 250 mg/5 mL (5 mL) oral solution 500 mg  500 mg Oral BID    haloperidol (HALDOL) 2 mg/mL oral solution 3 mg  3 mg Oral TID    lisinopril (PRINIVIL, ZESTRIL) tablet 10 mg  10 mg Oral DAILY    furosemide (LASIX) tablet 20 mg  20 mg Oral DAILY    haloperidol decanoate (HALDOL DECANOATE) 100 mg/mL injection 75 mg  75 mg IntraMUSCular Q28D    benzonatate (TESSALON) capsule 100 mg  100 mg Oral TID PRN    ibuprofen (MOTRIN) tablet 800 mg  800 mg Oral Q8H PRN    levothyroxine (SYNTHROID) tablet 25 mcg  25 mcg Oral ACB    albuterol (PROVENTIL VENTOLIN) nebulizer solution 2.5 mg  2.5 mg Nebulization Q4H PRN    ziprasidone (GEODON) 10 mg in sterile water (preservative free) 0.5 mL injection  10 mg IntraMUSCular BID PRN    benztropine (COGENTIN) tablet 1 mg  1 mg Oral BID PRN    benztropine (COGENTIN) injection 1 mg  1 mg IntraMUSCular BID PRN    zolpidem (AMBIEN) tablet 5 mg  5 mg Oral QHS PRN    acetaminophen (TYLENOL) tablet 650 mg  650 mg Oral Q4H PRN    magnesium hydroxide (MILK OF MAGNESIA) 400 mg/5 mL oral suspension 30 mL  30 mL Oral DAILY PRN    nicotine (NICODERM CQ) 21 mg/24 hr patch 1 Patch  1 Patch TransDERmal DAILY PRN    senna-docusate (PERICOLACE) 8.6-50 mg per tablet 1 Tab  1 Tab Oral QHS    simvastatin (ZOCOR) tablet 20 mg  20 mg Oral QHS      SCHEDULED MEDICATIONS  Current Facility-Administered Medications   Medication Dose Route Frequency    valproic acid (as sodium salt) (DEPAKENE) 250 mg/5 mL (5 mL) oral solution 500 mg  500 mg Oral BID    haloperidol (HALDOL) 2 mg/mL oral solution 3 mg  3 mg Oral TID    lisinopril (PRINIVIL, ZESTRIL) tablet 10 mg  10 mg Oral DAILY    furosemide (LASIX) tablet 20 mg  20 mg Oral DAILY    haloperidol decanoate (HALDOL DECANOATE) 100 mg/mL injection 75 mg  75 mg IntraMUSCular Q28D    levothyroxine (SYNTHROID) tablet 25 mcg  25 mcg Oral ACB    senna-docusate (PERICOLACE) 8.6-50 mg per tablet 1 Tab  1 Tab Oral QHS    simvastatin (ZOCOR) tablet 20 mg  20 mg Oral QHS                ASSESSMENT & PLAN        The patient, Chin Molina, is a 75 y.o.  female who presents at this time for treatment of the following diagnoses:  Patient Active Hospital Problem List:   Bipolar disorder ([facility] [address] (5/17/2018)    Assessment: dario alternating with depression     Plan: mood stabilizer/ antipsychotic   5/19/18 continue same medications    5/20/18 continue same medication and check Depakote level tomorrow    5/28/18 will increase her depakene to 250 mg TID , Add Haldol 0.5 mg TID as she is already on Haldol Decanoate but still disorganized and paranoid , will go down on Thorazine and adjust the dosage of Haldol and depakene according to her response , will watch for EPS   5/29/18 will continue decrease Thorazine   5/30/18 D/C Thorazine and increase Haldol to 1 mg po TID   5/31/18 continue same medications           I will continue to monitor blood levels (Depakote, Tegretol, lithium, clozapine---a drug with a narrow therapeutic index= NTI) and associated labs for drug therapy implemented that require intense monitoring for toxicity as deemed appropriate based on current medication side effects and pharmacodynamically determined drug 1/2 lives. A coordinated, multidisplinary treatment team (includes the nurse, unit pharmcist,  and writer) round was conducted for this initial evaluation with the patient present. The following regarding medications was addressed during rounds with patient: depakote   the risks and benefits of the proposed medication. The patient was given the opportunity to ask questions. Informed consent given to the use of the above medications. I will continue to adjust psychiatric and non-psychiatric medications (see above \"medication\" section and orders section for details) as deemed appropriate & based upon diagnoses and response to treatment. I have reviewed admission (and previous/old) labs and medical tests in the EHR and or transferring hospital documents. I will continue to order blood tests/labs and diagnostic tests as deemed appropriate and review results as they become available (see orders for details). I have reviewed old psychiatric and medical records available in the EHR. I Will order additional psychiatric records from other institutions to further elucidate the nature of patient's psychopathology and review once available. I will gather additional collateral information from friends, family and o/p treatment team to further elucidate the nature of patient's psychopathology and baselline level of psychiatric functioning.       ESTIMATED LENGTH OF STAY:    3-5 days        STRENGTHS:  Access to housing/residential stability, Knowledge of medications and Motivated and ready for change                                        SIGNED:    Maria Elena Estes MD  6/5/2018

## 2018-06-05 NOTE — PROGRESS NOTES
Problem: Altered Thought Process (Adult/Pediatric)  Goal: *STG: Participates in treatment plan  Outcome: Progressing Towards Goal  Pt in the dining area sitting quietly. Pt began talking to staff at a rapid garbled speech, with a big smile. Pt remain in the dining area until dinner. Pt compliant with meal and meds. After dinner pt went into her room and rested.

## 2018-06-05 NOTE — BH NOTES
GROUP THERAPY PROGRESS NOTE    Sherif Rdz participated in a morning Process Group on the Geriatric Unit, with a focus identifying feelings, planning for the day, and singing. Group time: 45 minutes. Personal goal for participation: To increase the capacity to shift ones mood, prepare for the day, and share in group singing. Goal orientation: The patient will be able to prepare for the day through group singing. Group therapy participation: When prompted, this patient actively participated in the group. Therapeutic interventions reviewed and discussed: The group members were introduce themselves by first names and participate in group singing as a way to increase their oxygen and blood flow and begin their day on a positive note. They were also asked to join in singing several songs. Impression of participation: The patient said she was feeling \"good. \" She was alert and generally oriented. She actively participated in the singing and shared with group about a play doll she remembered from her childhood. Some of her speech remained garbled but it did not appear as quite as rapid as it was last week. She did, however want to run on once she began sharing. She sang along with several of the songs, remembering some of the lyrics. She expressed no current SI/HI and displayed no overt psychosis. Her affect was mildly euphoric and her mood matched her affect. She was engaged in the group process and required only occasional redirection.

## 2018-06-05 NOTE — INTERDISCIPLINARY ROUNDS
Behavioral Health Interdisciplinary Rounds     Patient Name: Cherry Vargas  Age: 75 y.o. Room/Bed:  742/01  Primary Diagnosis: [facility] disorder ([facility]   Admission Status: Involuntary Commitment     Readmission within 30 days: no  Power of  in place: no  Patient requires a blocked bed: yes          Reason for blocked bed: disruptive    VTE Prophylaxis: Not indicated    Mobility needs/Fall risk: yes    Nutritional Plan: no  Consults:          Labs/Testing due today?: no    Sleep hours: 7.0     Participation in Care/Groups:  no  Medication Compliant?: Yes  PRNS (last 24 hours): Tessalon    Restraints (last 24 hours):  no     CIWA (range last 24 hours):     COWS (range last 24 hours):      Alcohol screening (AUDIT) completed -   AUDIT Score: 0     If applicable, date SBIRT discussed in treatment team AND documented:     Tobacco - patient is a smoker: Have You Used Tobacco in the Past 30 Days: No  Illegal Drugs use: Have You Used Any Illegal Substances Over the Past 12 Months: No    24 hour chart check complete: yes     Patient goal(s) for today:   Treatment team focus/goals:Plan to titrate her medications    Progress note - she remains loose and labile at times .  She has been complaint with her medications and treatment     LOS:  19  Expected LOS: TBD      Participating treatment team members: Bob Miranda Dr., RN

## 2018-06-05 NOTE — PROGRESS NOTES
Problem: Altered Thought Process (Adult/Pediatric)  Goal: *STG: Participates in treatment plan  Outcome: Progressing Towards Goal  Up on dining room participating in process group. Speech slighlty pressured with decrease in pace, loose associations with tangential thinking at times. Noted when alone pt rambles in fast pace speech. Demonstrates improvement in ability to focus with increase in periods of quietness, able to stop talking when prompted, demonstrates ability to follow staff direction, unit routine and respect peers personal space. Daily goal is to . Staff focus is on offering support, distraction activities and encourage increase time out of bed/room. Problem: Falls - Risk of  Goal: *Absence of Falls  Document Jovana Fall Risk and appropriate interventions in the flowsheet.    Outcome: Progressing Towards Goal  Fall Risk Interventions:  Mobility Interventions: Bed/chair exit alarm    Mentation Interventions: Reorient patient, Toileting rounds, More frequent rounding, Bed/chair exit alarm, Door open when patient unattended, Update white board, Room close to nurse's station    Medication Interventions: Teach patient to arise slowly, Assess postural VS orthostatic hypotension, Bed/chair exit alarm

## 2018-06-05 NOTE — PROGRESS NOTES
Problem: Falls - Risk of  Goal: *Absence of Falls  Document Jovana Fall Risk and appropriate interventions in the flowsheet.    Outcome: Progressing Towards Goal  Fall Risk Interventions:  Mobility Interventions: Bed/chair exit alarm, Communicate number of staff needed for ambulation/transfer, Patient to call before getting OOB    Mentation Interventions: Adequate sleep, hydration, pain control    Medication Interventions: Bed/chair exit alarm     Pt in bed sleeping at the time, no distress observed, call bell at bedside, q15 safety checks in place

## 2018-06-06 PROCEDURE — 65220000003 HC RM SEMIPRIVATE PSYCH

## 2018-06-06 PROCEDURE — 74011250637 HC RX REV CODE- 250/637: Performed by: PSYCHIATRY & NEUROLOGY

## 2018-06-06 PROCEDURE — 74011250637 HC RX REV CODE- 250/637: Performed by: NURSE PRACTITIONER

## 2018-06-06 RX ORDER — HALOPERIDOL 2 MG/ML
4 SOLUTION ORAL 3 TIMES DAILY
Status: DISCONTINUED | OUTPATIENT
Start: 2018-06-06 | End: 2018-06-08 | Stop reason: HOSPADM

## 2018-06-06 RX ADMIN — HALOPERIDOL 4 MG: 2 SOLUTION ORAL at 21:02

## 2018-06-06 RX ADMIN — FUROSEMIDE 20 MG: 20 TABLET ORAL at 09:47

## 2018-06-06 RX ADMIN — VALPROIC ACID 500 MG: 250 SOLUTION ORAL at 09:48

## 2018-06-06 RX ADMIN — SIMVASTATIN 20 MG: 20 TABLET, FILM COATED ORAL at 21:00

## 2018-06-06 RX ADMIN — STANDARDIZED SENNA CONCENTRATE AND DOCUSATE SODIUM 1 TABLET: 8.6; 5 TABLET, FILM COATED ORAL at 21:00

## 2018-06-06 RX ADMIN — LEVOTHYROXINE SODIUM 25 MCG: 25 TABLET ORAL at 06:35

## 2018-06-06 RX ADMIN — VALPROIC ACID 500 MG: 250 SOLUTION ORAL at 17:00

## 2018-06-06 RX ADMIN — ZOLPIDEM TARTRATE 5 MG: 5 TABLET ORAL at 22:00

## 2018-06-06 RX ADMIN — HALOPERIDOL 3 MG: 2 SOLUTION ORAL at 09:48

## 2018-06-06 RX ADMIN — LISINOPRIL 10 MG: 10 TABLET ORAL at 09:48

## 2018-06-06 NOTE — INTERDISCIPLINARY ROUNDS
Behavioral Health Interdisciplinary Rounds     Patient Name: Bienvenido Snyder  Age: 75 y.o. Room/Bed:  742/01  Primary Diagnosis: Bipolar disorder ([facility] [address]   Admission Status: Involuntary Commitment     Readmission within 30 days: no  Power of  in place: no  Patient requires a blocked bed: yes          Reason for blocked bed: disruptive     VTE Prophylaxis: Not indicated    Mobility needs/Fall risk: yes    Nutritional Plan: no  Consults:        Labs/Testing due today?: no    Sleep hours:  7       Participation in Care/Groups:  yes  Medication Compliant?: Yes  PRNS (last 24 hours): Sleep aid    Restraints (last 24 hours):  no     CIWA (range last 24 hours):     COWS (range last 24 hours):      Alcohol screening (AUDIT) completed -   AUDIT Score: 0     If applicable, date SBIRT discussed in treatment team AND documented:     Tobacco - patient is a smoker: Have You Used Tobacco in the Past 30 Days: No  Illegal Drugs use: Have You Used Any Illegal Substances Over the Past 12 Months: No    24 hour chart check complete: yes     Patient goal(s) for today:   Treatment team focus/goals: Plan to titrate her medications   Progress note - she has been doing well on the unit . Plan to encourage patient to participate in unit activities.     LOS:  20  Expected LOS: TBD     Financial concerns/prescription coverage: medicare   Date of last family contact:  SW called and spoke to her      Family requesting physician contact today:    Discharge plan: she will return home when ready for discharge   Guns in the home: no     Outpatient provider(s): Landen GRISSOM      Participating treatment team members: Isai Loera Dr., RN

## 2018-06-06 NOTE — BH NOTES
Psychiatric progress note            IDENTIFICATION:    Patient Name  Dontae Marie   Date of Birth 1945   [facility] [id number]   Medical Record Number  [id number]      Age  75 y.o. PCP Melissa Sterling MD   Admit date:  5/17/2018    Room Number  742/01  @ [facility]   Date of Service  6/6/2018            HISTORY         REASON FOR HOSPITALIZATION:  CC: \"agitation, delusions and dario\". Pt admitted under a temporary half-way order (TDO)  For severe psychosis and dario proving to be an imminent danger to self and others and an inability to care for self. HISTORY OF PRESENT ILLNESS:    The patient, Dontae Marie, is a 75 y.o. BLACK OR  female with a past psychiatric history significant for bipolar d/o , who presents at this time with complaints of (and/or evidence of) the following emotional symptoms: agitation, delusions and psychotic behavior. Additional symptomatology include agitation. The above symptoms have been present for 3 days . These symptoms are of severe severity. These symptoms are constant  in nature. The patient's condition has been precipitated by medication adjustments and psychosocial stressors (family conflicts  ). Patient's condition made worse by treatment noncompliance. UDS: MJ negative; BAL=0.     5/19/18 she is doing better and mood is ann marie and no anger issues and no self harm behavior sne she still has mood instability and gets angry and agitated sometimes  5/20/18 she is hyperverbal and agitated and she gets manic and rambling and she has flight of ideas , compliant with medications and no SI or I    5/21/18- Yesterday had some agitation and poor directability. Mkes obscene comments under her breath. Can also be charming, pleasant, and quite cooperative. 5/22/18- Med and meal compliant. Redirectable. Visible in milieu. 5/23/18-Very p;easant with maggie correa of humor. Social with peers. Has poor insight that she uses profanity and may offend others. But she is not doing this to be provocative. 5/24/18- Pleasant and cooperative. Med meal and group compliant. No side effects  5/25/18- Well mannered and very pleasant. Occasionally pressured and loud non offensive speech. Mood is good and is redirectable. 5/26/18  Mrs. Alissa Erazo remains labile. Resting comfortably this morning.  5/2718- Very manic, loud, disorganized and intrusive but redirectable  5/28/18 she is really manic , disorganized , screaming and yelling and not sleeping well , receive many prn medications and sounds her high dosage of thorazine is not working , she still paranoid and she spitting on the floor and licking her spit ,  0/65/33 she is manic and she is tolerating medications and not showing EPS and she slept fairly last night and less aggressive and no hallucination but has flight of ideas  5/30/18 she still manic and she is having racing thoughts and incoherent speech and she did not sleep well last night and did not respond to thorazine , she has no aggressive behavior and she is not showing SI or HI , still paranoid    5/31/18 she is doing better but still rambling and not sleeping that well , she is not aggressive and not paranoid like before and has been not sleeping well at night   6/1/18-In good spirits with no aggression. Redirectable with no use of obscenities. 6/2/18- pressured speech with tangential and loose associations. Will increase Haldol  6//3/18- continues with pressured and tangential speech with loose associations. Behavior is appropriate. No aggression or agitation. Very pleasant. Will continue to increase meds. 6/4/18- tolerating increase in medication well. Will consider an increase in depakote if she continues to show signs of dario. 6/5/18-Less intrusive and interruptable. Pleasant and appropriate. Valproate= 71. Sleeping well, med compliant. 6/6/18- having more goal directed thinking. Mood is good with bright affect. No pRN's  For aggression or agiatation. ALLERGIES: No Known Allergies   MEDICATIONS PRIOR TO ADMISSION:   Prescriptions Prior to Admission   Medication Sig    chlorproMAZINE (THORAZINE) 200 mg tablet Take 200 mg by mouth daily. With 400mg QPM    chlorproMAZINE (THORAZINE) 200 mg tablet Take 400 mg by mouth every evening.  haloperidol decanoate (HALDOL DECANOATE) 50 mg/mL injection 37.5 mg by IntraMUSCular route every four (4) weeks. Indications: Schizophrenia    divalproex DR (DEPAKOTE) 250 mg tablet Take 250 mg by mouth nightly.  ibuprofen (MOTRIN) 800 mg tablet Take 800 mg by mouth every eight (8) hours as needed for Pain.  simvastatin (ZOCOR) 20 mg tablet Take 20 mg by mouth nightly.  levothyroxine (SYNTHROID) 25 mcg tablet Take 25 mcg by mouth Daily (before breakfast).  melatonin tab tablet Take 10 mg by mouth nightly.  losartan (COZAAR) 50 mg tablet Take 50 mg by mouth daily.  albuterol (PROVENTIL HFA, VENTOLIN HFA, PROAIR HFA) 90 mcg/actuation inhaler Take 1-2 Puffs by inhalation every four (4) hours as needed for Wheezing or Shortness of Breath.  senna-docusate (PERICOLACE) 8.6-50 mg per tablet Take 2 Tabs by mouth two (2) times a day.  amLODIPine (NORVASC) 10 mg tablet Take 10 mg by mouth daily. PAST MEDICAL HISTORY:   Past Medical History:   Diagnosis Date    Asthma     Bipolar 1 disorder ([facility] [address]    No past surgical history on file. SOCIAL HISTORY:    Social History     Social History    Marital status:      Spouse name: N/A    Number of children: N/A    Years of education: N/A     Occupational History    Not on file. Social History Main Topics    Smoking status: Never Smoker    Smokeless tobacco: Not on file    Alcohol use No    Drug use: No    Sexual activity: Not Currently     Other Topics Concern    Not on file     Social History Narrative    75year old AA female admitted on TDO for agitation, making threats and having a fixed delusion that she is .  She has been making racial remarks and placing herself in danger. Pt has hx of treatment with Depakote, Thorazine and Haldol Dec.      FAMILY HISTORY:    No family history on file. REVIEW OF SYSTEMS:   Psychological ROS: positive for - irritability  Respiratory ROS: no cough, shortness of breath, or wheezing  Cardiovascular ROS: no chest pain or dyspnea on exertion  Pertinent items are noted in the History of Present Illness. All other Systems reviewed and are considered negative. MENTAL STATUS EXAM & VITALS     MENTAL STATUS EXAM (MSE):    MSE FINDINGS ARE WITHIN NORMAL LIMITS (WNL) UNLESS OTHERWISE STATED BELOW. ( ALL OF THE BELOW CATEGORIES OF THE MSE HAVE BEEN REVIEWED (reviewed 6/6/2018) AND UPDATED AS DEEMED APPROPRIATE )  General Presentation age appropriate, cooperative   Orientation oriented to time, place and person   Vital Signs  See below (reviewed 6/6/2018); Vital Signs (BP, Pulse, & Temp) are within normal limits if not listed below.    Gait and Station Stable/steady, no ataxia   Musculoskeletal System No extrapyramidal symptoms (EPS); no abnormal muscular movements or Tardive Dyskinesia (TD); muscle strength and tone are within normal limits   Language No aphasia or dysarthria   Speech:  normal pitch and normal volume   Thought Processes concrete; normal rate of thoughts; poor abstract reasoning/computation   Thought Associations goal directed   Thought Content Did not show delusion today    Suicidal Ideations none   Homicidal Ideations none   Mood:  irritable   Affect:  mood-congruent   Memory recent  fair   Memory remote:  fair   Concentration/Attention:  distractable   Fund of Knowledge average   Insight:  limited   Reliability fair   Judgment:  limited          VITALS:     Patient Vitals for the past 24 hrs:   Temp Pulse Resp BP SpO2   06/06/18 1251 - - - (!) 145/92 -   06/06/18 0800 97.5 °F (36.4 °C) 84 18 (!) 171/95 98 %   06/05/18 1918 97.5 °F (36.4 °C) 96 18 (!) 156/108 97 %   06/05/18 1530 98.2 °F (36.8 °C) 97 18 144/80 96 %     Wt Readings from Last 3 Encounters:   06/03/18 74.1 kg (163 lb 6.4 oz)   01/01/15 83.9 kg (185 lb)     Temp Readings from Last 3 Encounters:   06/06/18 97.5 °F (36.4 °C)   01/01/15 97.8 °F (36.6 °C)   12/27/13 97 °F (36.1 °C)     BP Readings from Last 3 Encounters:   06/06/18 (!) 145/92   01/01/15 103/65   12/27/13 118/83     Pulse Readings from Last 3 Encounters:   06/06/18 84   01/01/15 87   12/27/13 96            DATA     LABORATORY DATA:  Labs Reviewed   METABOLIC PANEL, COMPREHENSIVE - Abnormal; Notable for the following:        Result Value    BUN/Creatinine ratio 21 (*)     Protein, total 6.0 (*)     Albumin 3.0 (*)     A-G Ratio 1.0 (*)     All other components within normal limits   CBC W/O DIFF - Abnormal; Notable for the following:     WBC 3.2 (*)     All other components within normal limits   VALPROIC ACID - Abnormal; Notable for the following:     Valproic acid 44 (*)     All other components within normal limits   HEPATIC FUNCTION PANEL - Abnormal; Notable for the following:      Albumin 3.3 (*)     A-G Ratio 0.9 (*)     All other components within normal limits   GLUCOSE, FASTING   TSH 3RD GENERATION   LIPID PANEL   VALPROIC ACID     Admission on 05/17/2018   Component Date Value Ref Range Status    Sodium 05/18/2018 137  136 - 145 mmol/L Final    Potassium 05/18/2018 3.8  3.5 - 5.1 mmol/L Final    Chloride 05/18/2018 102  97 - 108 mmol/L Final    CO2 05/18/2018 28  21 - 32 mmol/L Final    Anion gap 05/18/2018 7  5 - 15 mmol/L Final    Glucose 05/18/2018 81  65 - 100 mg/dL Final    BUN 05/18/2018 12  6 - 20 MG/DL Final    Creatinine 05/18/2018 0.56  0.55 - 1.02 MG/DL Final    BUN/Creatinine ratio 05/18/2018 21* 12 - 20   Final    GFR est AA 05/18/2018 >60  >60 ml/min/1.73m2 Final    GFR est non-AA 05/18/2018 >60  >60 ml/min/1.73m2 Final    Calcium 05/18/2018 8.6  8.5 - 10.1 MG/DL Final    Bilirubin, total 05/18/2018 0.2  0.2 - 1.0 MG/DL Final    ALT (SGPT) 05/18/2018 20  12 - 78 U/L Final    AST (SGOT) 05/18/2018 17  15 - 37 U/L Final    Alk. phosphatase 05/18/2018 95  45 - 117 U/L Final    Protein, total 05/18/2018 6.0* 6.4 - 8.2 g/dL Final    Albumin 05/18/2018 3.0* 3.5 - 5.0 g/dL Final    Globulin 05/18/2018 3.0  2.0 - 4.0 g/dL Final    A-G Ratio 05/18/2018 1.0* 1.1 - 2.2   Final    Glucose 05/18/2018 81  65 - 100 MG/DL Final    WBC 05/18/2018 3.2* 3.6 - 11.0 K/uL Final    RBC 05/18/2018 4.10  3.80 - 5.20 M/uL Final    HGB 05/18/2018 12.1  11.5 - 16.0 g/dL Final    HCT 05/18/2018 36.3  35.0 - 47.0 % Final    MCV 05/18/2018 88.5  80.0 - 99.0 FL Final    MCH 05/18/2018 29.5  26.0 - 34.0 PG Final    MCHC 05/18/2018 33.3  30.0 - 36.5 g/dL Final    RDW 05/18/2018 13.2  11.5 - 14.5 % Final    PLATELET 32/37/4719 734  150 - 400 K/uL Final    MPV 05/18/2018 9.0  8.9 - 12.9 FL Final    NRBC 05/18/2018 0.0  0  WBC Final    ABSOLUTE NRBC 05/18/2018 0.00  0.00 - 0.01 K/uL Final    TSH 05/18/2018 1.11  0.36 - 3.74 uIU/mL Final    LIPID PROFILE 05/18/2018        Final    Cholesterol, total 05/18/2018 132  <200 MG/DL Final    Triglyceride 05/18/2018 40  <150 MG/DL Final    HDL Cholesterol 05/18/2018 73  MG/DL Final    LDL, calculated 05/18/2018 51  0 - 100 MG/DL Final    VLDL, calculated 05/18/2018 8  MG/DL Final    CHOL/HDL Ratio 05/18/2018 1.8  0 - 5.0   Final    Valproic acid 05/22/2018 44* 50 - 100 ug/ml Final    Valproic acid 06/01/2018 71  50 - 100 ug/ml Final    Protein, total 06/01/2018 6.9  6.4 - 8.2 g/dL Final    Albumin 06/01/2018 3.3* 3.5 - 5.0 g/dL Final    Globulin 06/01/2018 3.6  2.0 - 4.0 g/dL Final    A-G Ratio 06/01/2018 0.9* 1.1 - 2.2   Final    Bilirubin, total 06/01/2018 0.4  0.2 - 1.0 MG/DL Final    Bilirubin, direct 06/01/2018 0.2  0.0 - 0.2 MG/DL Final    Alk.  phosphatase 06/01/2018 96  45 - 117 U/L Final    AST (SGOT) 06/01/2018 20  15 - 37 U/L Final    ALT (SGPT) 06/01/2018 22  12 - 78 U/L Final RADIOLOGY REPORTS:    Results from Hospital Encounter encounter on 05/17/18   XR CHEST PA LAT   Narrative INDICATION:  cough     Exam: Chest 2 views. Comparison: None. Findings: Cardiomediastinal silhouette is normal. Pulmonary vasculature is not  engorged. No focal parenchymal opacities, effusions, or pneumothorax. Bony  thorax is intact. Impression Impression:  1. No acute cardiopulmonary disease      No results found.            MEDICATIONS       ALL MEDICATIONS  Current Facility-Administered Medications   Medication Dose Route Frequency    haloperidol (HALDOL) 2 mg/mL oral solution 4 mg  4 mg Oral TID    valproic acid (as sodium salt) (DEPAKENE) 250 mg/5 mL (5 mL) oral solution 500 mg  500 mg Oral BID    lisinopril (PRINIVIL, ZESTRIL) tablet 10 mg  10 mg Oral DAILY    furosemide (LASIX) tablet 20 mg  20 mg Oral DAILY    haloperidol decanoate (HALDOL DECANOATE) 100 mg/mL injection 75 mg  75 mg IntraMUSCular Q28D    benzonatate (TESSALON) capsule 100 mg  100 mg Oral TID PRN    ibuprofen (MOTRIN) tablet 800 mg  800 mg Oral Q8H PRN    levothyroxine (SYNTHROID) tablet 25 mcg  25 mcg Oral ACB    albuterol (PROVENTIL VENTOLIN) nebulizer solution 2.5 mg  2.5 mg Nebulization Q4H PRN    ziprasidone (GEODON) 10 mg in sterile water (preservative free) 0.5 mL injection  10 mg IntraMUSCular BID PRN    benztropine (COGENTIN) tablet 1 mg  1 mg Oral BID PRN    benztropine (COGENTIN) injection 1 mg  1 mg IntraMUSCular BID PRN    zolpidem (AMBIEN) tablet 5 mg  5 mg Oral QHS PRN    acetaminophen (TYLENOL) tablet 650 mg  650 mg Oral Q4H PRN    magnesium hydroxide (MILK OF MAGNESIA) 400 mg/5 mL oral suspension 30 mL  30 mL Oral DAILY PRN    nicotine (NICODERM CQ) 21 mg/24 hr patch 1 Patch  1 Patch TransDERmal DAILY PRN    senna-docusate (PERICOLACE) 8.6-50 mg per tablet 1 Tab  1 Tab Oral QHS    simvastatin (ZOCOR) tablet 20 mg  20 mg Oral QHS      SCHEDULED MEDICATIONS  Current Facility-Administered Medications   Medication Dose Route Frequency    haloperidol (HALDOL) 2 mg/mL oral solution 4 mg  4 mg Oral TID    valproic acid (as sodium salt) (DEPAKENE) 250 mg/5 mL (5 mL) oral solution 500 mg  500 mg Oral BID    lisinopril (PRINIVIL, ZESTRIL) tablet 10 mg  10 mg Oral DAILY    furosemide (LASIX) tablet 20 mg  20 mg Oral DAILY    haloperidol decanoate (HALDOL DECANOATE) 100 mg/mL injection 75 mg  75 mg IntraMUSCular Q28D    levothyroxine (SYNTHROID) tablet 25 mcg  25 mcg Oral ACB    senna-docusate (PERICOLACE) 8.6-50 mg per tablet 1 Tab  1 Tab Oral QHS    simvastatin (ZOCOR) tablet 20 mg  20 mg Oral QHS                ASSESSMENT & PLAN        The patient, Maria G Sue, is a 75 y.o.  female who presents at this time for treatment of the following diagnoses:  Patient Active Hospital Problem List:   [facility] ([facility]17/2018)    Assessment: dario alternating with depression     Plan: mood stabilizer/ antipsychotic   5/19/18 continue same medications    5/20/18 continue same medication and check Depakote level tomorrow    5/28/18 will increase her depakene to 250 mg TID , Add Haldol 0.5 mg TID as she is already on Haldol Decanoate but still disorganized and paranoid , will go down on Thorazine and adjust the dosage of Haldol and depakene according to her response , will watch for EPS   5/29/18 will continue decrease Thorazine   5/30/18 D/C Thorazine and increase Haldol to 1 mg po TID   5/31/18 continue same medications           I will continue to monitor blood levels (Depakote, Tegretol, lithium, clozapine---a drug with a narrow therapeutic index= NTI) and associated labs for drug therapy implemented that require intense monitoring for toxicity as deemed appropriate based on current medication side effects and pharmacodynamically determined drug 1/2 lives.          A coordinated, multidisplinary treatment team (includes the nurse, unit pharmcist,  and writer) round was conducted for this initial evaluation with the patient present. The following regarding medications was addressed during rounds with patient: depakote   the risks and benefits of the proposed medication. The patient was given the opportunity to ask questions. Informed consent given to the use of the above medications. I will continue to adjust psychiatric and non-psychiatric medications (see above \"medication\" section and orders section for details) as deemed appropriate & based upon diagnoses and response to treatment. I have reviewed admission (and previous/old) labs and medical tests in the EHR and or transferring hospital documents. I will continue to order blood tests/labs and diagnostic tests as deemed appropriate and review results as they become available (see orders for details). I have reviewed old psychiatric and medical records available in the EHR. I Will order additional psychiatric records from other institutions to further elucidate the nature of patient's psychopathology and review once available. I will gather additional collateral information from friends, family and o/p treatment team to further elucidate the nature of patient's psychopathology and baselline level of psychiatric functioning.       ESTIMATED LENGTH OF STAY:    3-5 days        STRENGTHS:  Access to housing/residential stability, Knowledge of medications and Motivated and ready for change                                        SIGNED:    Dano Russo MD  6/6/2018

## 2018-06-06 NOTE — PROGRESS NOTES
Problem: Falls - Risk of  Goal: *Absence of Falls  Document Jovana Fall Risk and appropriate interventions in the flowsheet. Outcome: Progressing Towards Goal  Fall Risk Interventions:  Mobility Interventions: Bed/chair exit alarm, Patient to call before getting OOB, Utilize walker, cane, or other assitive device    Mentation Interventions: Adequate sleep, hydration, pain control, Bed/chair exit alarm, Door open when patient unattended    Medication Interventions: Bed/chair exit alarm, Evaluate medications/consider consulting pharmacy, Patient to call before getting OOB       Free of falls. Falls tool completed and accurate. Bed alarm on the bed. Patient ambulating with walker,slightly unstable-needing stand by assistance.

## 2018-06-06 NOTE — PROGRESS NOTES
Problem: Altered Thought Process (Adult/Pediatric)  Goal: *STG: Participates in treatment plan  Outcome: Progressing Towards Goal  Received this morning resting in bed. Woke alert,but remains confused to situation. Speech is still a little rapid,but better. Is able to follow directions during am cares. Came out to the dining room for breakfast and attended music group. Able to focus during group,singing. Has longer  periods of time where she is quiet. Takes re-direction well when needed. Staff to continue to encourage unit and group activities to help keep patient busy. Goal: *STG: Complies with medication therapy  Outcome: Progressing Towards Goal  Has been medication and meal compliant.

## 2018-06-06 NOTE — PROGRESS NOTES
Problem: Falls - Risk of  Goal: *Absence of Falls  Document Jovana Fall Risk and appropriate interventions in the flowsheet.    Outcome: Progressing Towards Goal  Fall Risk Interventions:   Lying quietly in bed with eyes closed , respirations even and unlabored , NAD noted   Q15 min safety rounds continue ,   Mobility Interventions: Bed/chair exit alarm    Mentation Interventions: Bed/chair exit alarm    Medication Interventions: Evaluate medications/consider consulting pharmacy

## 2018-06-06 NOTE — BH NOTES
GROUP THERAPY PROGRESS NOTE    Lola Gilmore participated in a morning Process Group on the Geriatric Unit, with a focus identifying feelings, planning for the day, and singing. Group time: 40 minutes. Personal goal for participation: To increase the capacity to shift ones mood, prepare for the day, and share in group singing. Goal orientation: The patient will be able to prepare for the day through group singing. Group therapy participation: When prompted, this patient participated in the group. Therapeutic interventions reviewed and discussed: The group members were introduce themselves by first names and participate in group singing as a way to increase their oxygen and blood flow and begin their day on a positive note. They were also asked to join in singing several songs. Impression of participation: The patient said she was feeling \"great. \" She was hyper-verbal and only stopped talking when she was singing. She was alert and generally oriented. She did not respond to redirection except with music. Her thinking and conversation was circumstantial and tangential. She expressed no current SI/HI and may have been responding to internal stimuli. Her affect was euphoric and manic. Her mood matched her affect. She had a difficult time remaining engaged with the group process.

## 2018-06-07 PROCEDURE — 65220000003 HC RM SEMIPRIVATE PSYCH

## 2018-06-07 PROCEDURE — 74011250637 HC RX REV CODE- 250/637: Performed by: PSYCHIATRY & NEUROLOGY

## 2018-06-07 PROCEDURE — 74011250637 HC RX REV CODE- 250/637: Performed by: NURSE PRACTITIONER

## 2018-06-07 RX ADMIN — SIMVASTATIN 20 MG: 20 TABLET, FILM COATED ORAL at 20:15

## 2018-06-07 RX ADMIN — VALPROIC ACID 500 MG: 250 SOLUTION ORAL at 17:38

## 2018-06-07 RX ADMIN — VALPROIC ACID 500 MG: 250 SOLUTION ORAL at 08:24

## 2018-06-07 RX ADMIN — FUROSEMIDE 20 MG: 20 TABLET ORAL at 08:23

## 2018-06-07 RX ADMIN — HALOPERIDOL 4 MG: 2 SOLUTION ORAL at 14:11

## 2018-06-07 RX ADMIN — HALOPERIDOL 4 MG: 2 SOLUTION ORAL at 20:15

## 2018-06-07 RX ADMIN — ZOLPIDEM TARTRATE 5 MG: 5 TABLET ORAL at 20:16

## 2018-06-07 RX ADMIN — STANDARDIZED SENNA CONCENTRATE AND DOCUSATE SODIUM 1 TABLET: 8.6; 5 TABLET, FILM COATED ORAL at 20:15

## 2018-06-07 RX ADMIN — HALOPERIDOL 4 MG: 2 SOLUTION ORAL at 08:24

## 2018-06-07 RX ADMIN — LEVOTHYROXINE SODIUM 25 MCG: 25 TABLET ORAL at 06:36

## 2018-06-07 RX ADMIN — LISINOPRIL 10 MG: 10 TABLET ORAL at 08:23

## 2018-06-07 NOTE — PROGRESS NOTES
Problem: Altered Thought Process (Adult/Pediatric)  Goal: *STG: Complies with medication therapy  1530: Greeted patient on unit in room resting quietly. Appears in no acute distress. No voiced complaints at present. Will continue to monitor on Q 15 minute safety checks. 1800: Patient alert, vitals stable, wnl.   Medication and meal compliant. Ate 95% of meal.   Remains pleasant and cooperative  Much less lability. No tearfulness or anger on shift. Hyperverbal at times. Will continue to monitor. 2145: Patient appears to be sleeping, with eyes closed.

## 2018-06-07 NOTE — PROGRESS NOTES
Problem: Altered Thought Process (Adult/Pediatric)  Goal: *STG: Attends activities and groups  Outcome: Progressing Towards Goal  Attended music group this morning participating appropriately.

## 2018-06-07 NOTE — PROGRESS NOTES
Problem: Altered Thought Process (Adult/Pediatric)  Goal: *STG: Demonstrates ability to understand and use improved judgment in daily activities and relationships  Outcome: Not Progressing Towards Goal  Pt in milieu confabulating incomprehensible words, using racial profanities times 3 in day room, pt was informed of inappropriate language on each occasion. Hyper verbal, disruptive during relaxation group as members were asked to quietly listen to music and practice deep breathing. Overall, cooperative.

## 2018-06-07 NOTE — BH NOTES
Psychiatric progress note            IDENTIFICATION:    Patient Name  Maria G Sue   Date of Birth 1945   [facility] [id number]   Medical Record Number  [id number]      Age  75 y.o. PCP Leno Bearden MD   Admit date:  5/17/2018    Room Number  742/02  @ [facility]   Date of Service  6/7/2018            HISTORY         REASON FOR HOSPITALIZATION:  CC: \"agitation, delusions and dario\". Pt admitted under a temporary senior living order (TDO)  For severe psychosis and dario proving to be an imminent danger to self and others and an inability to care for self. HISTORY OF PRESENT ILLNESS:    The patient, Maria G Sue, is a 75 y.o. BLACK OR  female with a past psychiatric history significant for bipolar d/o , who presents at this time with complaints of (and/or evidence of) the following emotional symptoms: agitation, delusions and psychotic behavior. Additional symptomatology include agitation. The above symptoms have been present for 3 days . These symptoms are of severe severity. These symptoms are constant  in nature. The patient's condition has been precipitated by medication adjustments and psychosocial stressors (family conflicts  ). Patient's condition made worse by treatment noncompliance. UDS: MJ negative; BAL=0.     5/19/18 she is doing better and mood is ann marie and no anger issues and no self harm behavior sne she still has mood instability and gets angry and agitated sometimes  5/20/18 she is hyperverbal and agitated and she gets manic and rambling and she has flight of ideas , compliant with medications and no SI or I    5/21/18- Yesterday had some agitation and poor directability. Mkes obscene comments under her breath. Can also be charming, pleasant, and quite cooperative. 5/22/18- Med and meal compliant. Redirectable. Visible in milieu. 5/23/18-Very p;easant with agokatie correiae of humor. Social with peers. Has poor insight that she uses profanity and may offend others. But she is not doing this to be provocative. 5/24/18- Pleasant and cooperative. Med meal and group compliant. No side effects  5/25/18- Well mannered and very pleasant. Occasionally pressured and loud non offensive speech. Mood is good and is redirectable. 5/26/18  Mrs. Dustin Torrez remains labile. Resting comfortably this morning.  5/2718- Very manic, loud, disorganized and intrusive but redirectable  5/28/18 she is really manic , disorganized , screaming and yelling and not sleeping well , receive many prn medications and sounds her high dosage of thorazine is not working , she still paranoid and she spitting on the floor and licking her spit ,  3/78/41 she is manic and she is tolerating medications and not showing EPS and she slept fairly last night and less aggressive and no hallucination but has flight of ideas  5/30/18 she still manic and she is having racing thoughts and incoherent speech and she did not sleep well last night and did not respond to thorazine , she has no aggressive behavior and she is not showing SI or HI , still paranoid    5/31/18 she is doing better but still rambling and not sleeping that well , she is not aggressive and not paranoid like before and has been not sleeping well at night   6/1/18-In good spirits with no aggression. Redirectable with no use of obscenities. 6/2/18- pressured speech with tangential and loose associations. Will increase Haldol  6//3/18- continues with pressured and tangential speech with loose associations. Behavior is appropriate. No aggression or agitation. Very pleasant. Will continue to increase meds. 6/4/18- tolerating increase in medication well. Will consider an increase in depakote if she continues to show signs of dario. 6/5/18-Less intrusive and interruptable. Pleasant and appropriate. Valproate= 71. Sleeping well, med compliant. 6/6/18- having more goal directed thinking. Mood is good with bright affect.  No pRN's  For aggression or agiatation. 6/7/18- Pt is more stable. Has not required PRN's for aggression or agitatioon. Occasionally shouts inappropriate racial remarks. Will continue med adjustment. ALLERGIES: No Known Allergies   MEDICATIONS PRIOR TO ADMISSION:   Prescriptions Prior to Admission   Medication Sig    chlorproMAZINE (THORAZINE) 200 mg tablet Take 200 mg by mouth daily. With 400mg QPM    chlorproMAZINE (THORAZINE) 200 mg tablet Take 400 mg by mouth every evening.  haloperidol decanoate (HALDOL DECANOATE) 50 mg/mL injection 37.5 mg by IntraMUSCular route every four (4) weeks. Indications: Schizophrenia    divalproex DR (DEPAKOTE) 250 mg tablet Take 250 mg by mouth nightly.  ibuprofen (MOTRIN) 800 mg tablet Take 800 mg by mouth every eight (8) hours as needed for Pain.  simvastatin (ZOCOR) 20 mg tablet Take 20 mg by mouth nightly.  levothyroxine (SYNTHROID) 25 mcg tablet Take 25 mcg by mouth Daily (before breakfast).  melatonin tab tablet Take 10 mg by mouth nightly.  losartan (COZAAR) 50 mg tablet Take 50 mg by mouth daily.  albuterol (PROVENTIL HFA, VENTOLIN HFA, PROAIR HFA) 90 mcg/actuation inhaler Take 1-2 Puffs by inhalation every four (4) hours as needed for Wheezing or Shortness of Breath.  senna-docusate (PERICOLACE) 8.6-50 mg per tablet Take 2 Tabs by mouth two (2) times a day.  amLODIPine (NORVASC) 10 mg tablet Take 10 mg by mouth daily. PAST MEDICAL HISTORY:   Past Medical History:   Diagnosis Date    Asthma     Bipolar 1 disorder ([facility] [address]    No past surgical history on file. SOCIAL HISTORY:    Social History     Social History    Marital status:      Spouse name: N/A    Number of children: N/A    Years of education: N/A     Occupational History    Not on file.      Social History Main Topics    Smoking status: Never Smoker    Smokeless tobacco: Not on file    Alcohol use No    Drug use: No    Sexual activity: Not Currently     Other Topics Concern    Not on file     Social History Narrative    75year old AA female admitted on TDO for agitation, making threats and having a fixed delusion that she is . She has been making racial remarks and placing herself in danger. Pt has hx of treatment with Depakote, Thorazine and Haldol Dec.      FAMILY HISTORY:    No family history on file. REVIEW OF SYSTEMS:   Psychological ROS: positive for - irritability  Respiratory ROS: no cough, shortness of breath, or wheezing  Cardiovascular ROS: no chest pain or dyspnea on exertion  Pertinent items are noted in the History of Present Illness. All other Systems reviewed and are considered negative. MENTAL STATUS EXAM & VITALS     MENTAL STATUS EXAM (MSE):    MSE FINDINGS ARE WITHIN NORMAL LIMITS (WNL) UNLESS OTHERWISE STATED BELOW. ( ALL OF THE BELOW CATEGORIES OF THE MSE HAVE BEEN REVIEWED (reviewed 6/7/2018) AND UPDATED AS DEEMED APPROPRIATE )  General Presentation age appropriate, cooperative   Orientation oriented to time, place and person   Vital Signs  See below (reviewed 6/7/2018); Vital Signs (BP, Pulse, & Temp) are within normal limits if not listed below.    Gait and Station Stable/steady, no ataxia   Musculoskeletal System No extrapyramidal symptoms (EPS); no abnormal muscular movements or Tardive Dyskinesia (TD); muscle strength and tone are within normal limits   Language No aphasia or dysarthria   Speech:  normal pitch and normal volume   Thought Processes concrete; normal rate of thoughts; poor abstract reasoning/computation   Thought Associations goal directed   Thought Content Did not show delusion today    Suicidal Ideations none   Homicidal Ideations none   Mood:  irritable   Affect:  mood-congruent   Memory recent  fair   Memory remote:  fair   Concentration/Attention:  distractable   Fund of Knowledge average   Insight:  limited   Reliability fair   Judgment:  limited          VITALS:     Patient Vitals for the past 24 hrs:   Temp Pulse Resp BP SpO2   06/07/18 0727 98.1 °F (36.7 °C) 97 16 (!) 138/108 99 %   06/06/18 1940 98.6 °F (37 °C) 90 18 (!) 163/97 99 %   06/06/18 1509 98.5 °F (36.9 °C) 80 18 132/82 97 %   06/06/18 1251 - - - (!) 145/92 -     Wt Readings from Last 3 Encounters:   06/03/18 74.1 kg (163 lb 6.4 oz)   01/01/15 83.9 kg (185 lb)     Temp Readings from Last 3 Encounters:   06/07/18 98.1 °F (36.7 °C)   01/01/15 97.8 °F (36.6 °C)   12/27/13 97 °F (36.1 °C)     BP Readings from Last 3 Encounters:   06/07/18 (!) 138/108   01/01/15 103/65   12/27/13 118/83     Pulse Readings from Last 3 Encounters:   06/07/18 97   01/01/15 87   12/27/13 96            DATA     LABORATORY DATA:  Labs Reviewed   METABOLIC PANEL, COMPREHENSIVE - Abnormal; Notable for the following:        Result Value    BUN/Creatinine ratio 21 (*)     Protein, total 6.0 (*)     Albumin 3.0 (*)     A-G Ratio 1.0 (*)     All other components within normal limits   CBC W/O DIFF - Abnormal; Notable for the following:     WBC 3.2 (*)     All other components within normal limits   VALPROIC ACID - Abnormal; Notable for the following:     Valproic acid 44 (*)     All other components within normal limits   HEPATIC FUNCTION PANEL - Abnormal; Notable for the following:      Albumin 3.3 (*)     A-G Ratio 0.9 (*)     All other components within normal limits   GLUCOSE, FASTING   TSH 3RD GENERATION   LIPID PANEL   VALPROIC ACID     Admission on 05/17/2018   Component Date Value Ref Range Status    Sodium 05/18/2018 137  136 - 145 mmol/L Final    Potassium 05/18/2018 3.8  3.5 - 5.1 mmol/L Final    Chloride 05/18/2018 102  97 - 108 mmol/L Final    CO2 05/18/2018 28  21 - 32 mmol/L Final    Anion gap 05/18/2018 7  5 - 15 mmol/L Final    Glucose 05/18/2018 81  65 - 100 mg/dL Final    BUN 05/18/2018 12  6 - 20 MG/DL Final    Creatinine 05/18/2018 0.56  0.55 - 1.02 MG/DL Final    BUN/Creatinine ratio 05/18/2018 21* 12 - 20   Final    GFR est AA 05/18/2018 >60  >60 ml/min/1.73m2 Final    GFR est non-AA 05/18/2018 >60  >60 ml/min/1.73m2 Final    Calcium 05/18/2018 8.6  8.5 - 10.1 MG/DL Final    Bilirubin, total 05/18/2018 0.2  0.2 - 1.0 MG/DL Final    ALT (SGPT) 05/18/2018 20  12 - 78 U/L Final    AST (SGOT) 05/18/2018 17  15 - 37 U/L Final    Alk.  phosphatase 05/18/2018 95  45 - 117 U/L Final    Protein, total 05/18/2018 6.0* 6.4 - 8.2 g/dL Final    Albumin 05/18/2018 3.0* 3.5 - 5.0 g/dL Final    Globulin 05/18/2018 3.0  2.0 - 4.0 g/dL Final    A-G Ratio 05/18/2018 1.0* 1.1 - 2.2   Final    Glucose 05/18/2018 81  65 - 100 MG/DL Final    WBC 05/18/2018 3.2* 3.6 - 11.0 K/uL Final    RBC 05/18/2018 4.10  3.80 - 5.20 M/uL Final    HGB 05/18/2018 12.1  11.5 - 16.0 g/dL Final    HCT 05/18/2018 36.3  35.0 - 47.0 % Final    MCV 05/18/2018 88.5  80.0 - 99.0 FL Final    MCH 05/18/2018 29.5  26.0 - 34.0 PG Final    MCHC 05/18/2018 33.3  30.0 - 36.5 g/dL Final    RDW 05/18/2018 13.2  11.5 - 14.5 % Final    PLATELET 23/04/5156 848  150 - 400 K/uL Final    MPV 05/18/2018 9.0  8.9 - 12.9 FL Final    NRBC 05/18/2018 0.0  0  WBC Final    ABSOLUTE NRBC 05/18/2018 0.00  0.00 - 0.01 K/uL Final    TSH 05/18/2018 1.11  0.36 - 3.74 uIU/mL Final    LIPID PROFILE 05/18/2018        Final    Cholesterol, total 05/18/2018 132  <200 MG/DL Final    Triglyceride 05/18/2018 40  <150 MG/DL Final    HDL Cholesterol 05/18/2018 73  MG/DL Final    LDL, calculated 05/18/2018 51  0 - 100 MG/DL Final    VLDL, calculated 05/18/2018 8  MG/DL Final    CHOL/HDL Ratio 05/18/2018 1.8  0 - 5.0   Final    Valproic acid 05/22/2018 44* 50 - 100 ug/ml Final    Valproic acid 06/01/2018 71  50 - 100 ug/ml Final    Protein, total 06/01/2018 6.9  6.4 - 8.2 g/dL Final    Albumin 06/01/2018 3.3* 3.5 - 5.0 g/dL Final    Globulin 06/01/2018 3.6  2.0 - 4.0 g/dL Final    A-G Ratio 06/01/2018 0.9* 1.1 - 2.2   Final    Bilirubin, total 06/01/2018 0.4  0.2 - 1.0 MG/DL Final    Bilirubin, direct 06/01/2018 0.2  0.0 - 0.2 MG/DL Final    Alk. phosphatase 06/01/2018 96  45 - 117 U/L Final    AST (SGOT) 06/01/2018 20  15 - 37 U/L Final    ALT (SGPT) 06/01/2018 22  12 - 78 U/L Final        RADIOLOGY REPORTS:    Results from Hospital Encounter encounter on 05/17/18   XR CHEST PA LAT   Narrative INDICATION:  cough     Exam: Chest 2 views. Comparison: None. Findings: Cardiomediastinal silhouette is normal. Pulmonary vasculature is not  engorged. No focal parenchymal opacities, effusions, or pneumothorax. Bony  thorax is intact. Impression Impression:  1. No acute cardiopulmonary disease      No results found.            MEDICATIONS       ALL MEDICATIONS  Current Facility-Administered Medications   Medication Dose Route Frequency    haloperidol (HALDOL) 2 mg/mL oral solution 4 mg  4 mg Oral TID    valproic acid (as sodium salt) (DEPAKENE) 250 mg/5 mL (5 mL) oral solution 500 mg  500 mg Oral BID    lisinopril (PRINIVIL, ZESTRIL) tablet 10 mg  10 mg Oral DAILY    furosemide (LASIX) tablet 20 mg  20 mg Oral DAILY    haloperidol decanoate (HALDOL DECANOATE) 100 mg/mL injection 75 mg  75 mg IntraMUSCular Q28D    benzonatate (TESSALON) capsule 100 mg  100 mg Oral TID PRN    ibuprofen (MOTRIN) tablet 800 mg  800 mg Oral Q8H PRN    levothyroxine (SYNTHROID) tablet 25 mcg  25 mcg Oral ACB    albuterol (PROVENTIL VENTOLIN) nebulizer solution 2.5 mg  2.5 mg Nebulization Q4H PRN    ziprasidone (GEODON) 10 mg in sterile water (preservative free) 0.5 mL injection  10 mg IntraMUSCular BID PRN    benztropine (COGENTIN) tablet 1 mg  1 mg Oral BID PRN    benztropine (COGENTIN) injection 1 mg  1 mg IntraMUSCular BID PRN    zolpidem (AMBIEN) tablet 5 mg  5 mg Oral QHS PRN    acetaminophen (TYLENOL) tablet 650 mg  650 mg Oral Q4H PRN    magnesium hydroxide (MILK OF MAGNESIA) 400 mg/5 mL oral suspension 30 mL  30 mL Oral DAILY PRN    nicotine (NICODERM CQ) 21 mg/24 hr patch 1 Patch  1 Patch TransDERmal DAILY PRN    senna-docusate (PERICOLACE) 8.6-50 mg per tablet 1 Tab  1 Tab Oral QHS    simvastatin (ZOCOR) tablet 20 mg  20 mg Oral QHS      SCHEDULED MEDICATIONS  Current Facility-Administered Medications   Medication Dose Route Frequency    haloperidol (HALDOL) 2 mg/mL oral solution 4 mg  4 mg Oral TID    valproic acid (as sodium salt) (DEPAKENE) 250 mg/5 mL (5 mL) oral solution 500 mg  500 mg Oral BID    lisinopril (PRINIVIL, ZESTRIL) tablet 10 mg  10 mg Oral DAILY    furosemide (LASIX) tablet 20 mg  20 mg Oral DAILY    haloperidol decanoate (HALDOL DECANOATE) 100 mg/mL injection 75 mg  75 mg IntraMUSCular Q28D    levothyroxine (SYNTHROID) tablet 25 mcg  25 mcg Oral ACB    senna-docusate (PERICOLACE) 8.6-50 mg per tablet 1 Tab  1 Tab Oral QHS    simvastatin (ZOCOR) tablet 20 mg  20 mg Oral QHS                ASSESSMENT & PLAN        The patient, Bhavik Kerns, is a 75 y.o.  female who presents at this time for treatment of the following diagnoses:  Patient Active Hospital Problem List:   Bipolar disorder ([facility] [address] (5/17/2018)    Assessment: dario alternating with depression     Plan: mood stabilizer/ antipsychotic   5/19/18 continue same medications    5/20/18 continue same medication and check Depakote level tomorrow    5/28/18 will increase her depakene to 250 mg TID , Add Haldol 0.5 mg TID as she is already on Haldol Decanoate but still disorganized and paranoid , will go down on Thorazine and adjust the dosage of Haldol and depakene according to her response , will watch for EPS   5/29/18 will continue decrease Thorazine   5/30/18 D/C Thorazine and increase Haldol to 1 mg po TID   5/31/18 continue same medications           I will continue to monitor blood levels (Depakote, Tegretol, lithium, clozapine---a drug with a narrow therapeutic index= NTI) and associated labs for drug therapy implemented that require intense monitoring for toxicity as deemed appropriate based on current medication side effects and pharmacodynamically determined drug 1/2 lives. A coordinated, multidisplinary treatment team (includes the nurse, unit pharmcist,  and writer) round was conducted for this initial evaluation with the patient present. The following regarding medications was addressed during rounds with patient: depakote   the risks and benefits of the proposed medication. The patient was given the opportunity to ask questions. Informed consent given to the use of the above medications. I will continue to adjust psychiatric and non-psychiatric medications (see above \"medication\" section and orders section for details) as deemed appropriate & based upon diagnoses and response to treatment. I have reviewed admission (and previous/old) labs and medical tests in the EHR and or transferring hospital documents. I will continue to order blood tests/labs and diagnostic tests as deemed appropriate and review results as they become available (see orders for details). I have reviewed old psychiatric and medical records available in the EHR. I Will order additional psychiatric records from other institutions to further elucidate the nature of patient's psychopathology and review once available. I will gather additional collateral information from friends, family and o/p treatment team to further elucidate the nature of patient's psychopathology and baselline level of psychiatric functioning.       ESTIMATED LENGTH OF STAY:    3-5 days        STRENGTHS:  Access to housing/residential stability, Knowledge of medications and Motivated and ready for change                                        SIGNED:    Ly Rain MD  6/7/2018

## 2018-06-07 NOTE — PROGRESS NOTES
Problem: Falls - Risk of  Goal: *Absence of Falls  Document Jovana Fall Risk and appropriate interventions in the flowsheet. Outcome: Progressing Towards Goal  Fall Risk Interventions:  Mobility Interventions: Bed/chair exit alarm, Patient to call before getting OOB, Utilize walker, cane, or other assitive device    Mentation Interventions: Adequate sleep, hydration, pain control, Bed/chair exit alarm, Door open when patient unattended, Eyeglasses and hearing aids    Medication Interventions: Bed/chair exit alarm, Teach patient to arise slowly    Elimination Interventions: Bed/chair exit alarm, Call light in reach, Toilet paper/wipes in reach    History of Falls Interventions: Bed/chair exit alarm, Door open when patient unattended  Free of falls. Falls tool completed and accurate. Bed alarm on the bed. Patient ambulating and fairly stable  with walker and stand by assistance.

## 2018-06-07 NOTE — PROGRESS NOTES
Problem: Falls - Risk of  Goal: *Absence of Falls  Document Jovana Fall Risk and appropriate interventions in the flowsheet.    Outcome: Progressing Towards Goal  Fall Risk Interventions:  Lying quietly in bed with eyes closed , respirations even and unlabored , NAD noted   Q15 min safety rounds continue   Mobility Interventions: Bed/chair exit alarm    Mentation Interventions: Adequate sleep, hydration, pain control    Medication Interventions: Bed/chair exit alarm    Elimination Interventions: Bed/chair exit alarm    History of Falls Interventions: Bed/chair exit alarm, Door open when patient unattended

## 2018-06-07 NOTE — PROGRESS NOTES
Scheduled to be given am blood pressure medications. Will re-take vitals and re-asses after medication.

## 2018-06-07 NOTE — BH NOTES
GROUP THERAPY PROGRESS NOTE    Lul Gomez partially participated in a morning Process Group on the Geriatric Unit, with a focus identifying feelings, planning for the day, and singing. Group time: 45 minutes. Personal goal for participation: To increase the capacity to shift ones mood, prepare for the day, and share in group singing. Goal orientation: The patient will be able to prepare for the day through group singing. Group therapy participation: When prompted, this patient actively participated in the group, after joining the group about FDC through. Therapeutic interventions reviewed and discussed: The group members were introduce themselves by first names and participate in group singing as a way to increase their oxygen and blood flow and begin their day on a positive note. They were also asked to join in singing several songs. Impression of participation: The patient spoke in a rapid and manic-like voice. But she also wanted to participated in the singing and would often start off a song's lyrics before other had found their place in the song sheets. She expressed no current SI/HI and displayed no overt psychosis. She was alert and generally oriented. Her affect was mildly euphoric and anxious. Her mood matched her affect.

## 2018-06-07 NOTE — INTERDISCIPLINARY ROUNDS
Behavioral Health Interdisciplinary Rounds     Patient Name: David Cottrell  Age: 75 y.o.   Room/Bed:  742/02  Primary Diagnosis: Bipolar disorder ([facility] [address]   Admission Status: Involuntary Commitment     Readmission within 30 days: no  Power of  in place: no  Patient requires a blocked bed: no          Reason for blocked bed:     VTE Prophylaxis: Not indicated    Mobility needs/Fall risk: yes -uses walker    Nutritional Plan: no  Consults:        Labs/Testing due today?: no    Sleep hours:  5     Participation in Care/Groups:    Medication Compliant?: yes   PRNS (last 24 hours): Sleep Aid    Restraints (last 24 hours):  no     CIWA (range last 24 hours):     COWS (range last 24 hours):      Alcohol screening (AUDIT) completed -   AUDIT Score: 0     If applicable, date SBIRT discussed in treatment team AND documented:     Tobacco - patient is a smoker: Have You Used Tobacco in the Past 30 Days: No  Illegal Drugs use: Have You Used Any Illegal Substances Over the Past 12 Months: No    24 hour chart check complete: yes     Patient goal(s) for today:   Treatment team focus/goals: Call family and PACT; Prepare for discharge tomorrow  Progress note: Pt will discharge tomorrow     LOS:  21  Expected LOS: 22    Financial concerns/prescription coverage: Medicare; Medicaid  Date of last family contact: 6/7 SW spoke to sister     Family requesting physician contact today: No  Discharge plan: Return home  Guns in the home: No      Outpatient provider(s): Land[facility] GRISS[facility]    Participating treatment team members: Wilmot Merlin, MSW; Dr. Lc Hobson MD; Blaise Maier RN; Micaela Drake, PharmD

## 2018-06-07 NOTE — PROGRESS NOTES
Problem: Altered Thought Process (Adult/Pediatric)  Goal: *STG: Participates in treatment plan  Outcome: Progressing Towards Goal  Received this morning in her room. Woke alert,remains confused to time/place. Is pleasant and cooperative with staff and others. Speech remains rapid,but patient appears better in being able to follow directions and attend and participate in music group appropriately. Quiet this morning watching TV for a good period of time. Less labile. Staff to continue to encourage unit/group activities and re-direct when necessary. Goal: *STG: Complies with medication therapy  Outcome: Progressing Towards Goal  Has been medication and meal compliant.     [address] [facility] [address]  Master Treatment Plan for Jaime Points    Date Treatment Plan Initiated: 6/7/18    Treatment Plan Modalities:  Type of Modality Amount  (x minutes) Frequency (x/week) Duration (x days) Name of Responsible Staff   [address]  meetings to encourage peer interactions 15 7 1      Group psychotherapy to assist in building coping skills and internal controls 60 7 1 Nicolás Castillo   Therapeutic activity groups to build coping skills 60 7 1 Nicolás Castillo   Psychoeducation in group setting to address:   Medication education   15 7 1    Coping skills         Relaxation techniques         Symptom management         Discharge planning   60 2 Maxi Castellon 115   60 1 1 volunteer   Recovery/AA/NA      volunteer   Physician medication management   15 7 1    Family meeting/discharge planning   15 2 [address] [facility] and Yuriy Zaman

## 2018-06-08 VITALS
BODY MASS INDEX: 30.85 KG/M2 | WEIGHT: 163.4 LBS | OXYGEN SATURATION: 97 % | DIASTOLIC BLOOD PRESSURE: 92 MMHG | TEMPERATURE: 98.3 F | SYSTOLIC BLOOD PRESSURE: 159 MMHG | HEIGHT: 61 IN | HEART RATE: 100 BPM | RESPIRATION RATE: 16 BRPM

## 2018-06-08 LAB
ALBUMIN SERPL-MCNC: 3.4 G/DL (ref 3.5–5)
ALBUMIN/GLOB SERPL: 0.9 {RATIO} (ref 1.1–2.2)
ALP SERPL-CCNC: 100 U/L (ref 45–117)
ALT SERPL-CCNC: 20 U/L (ref 12–78)
ANION GAP SERPL CALC-SCNC: 9 MMOL/L (ref 5–15)
AST SERPL-CCNC: 22 U/L (ref 15–37)
BILIRUB SERPL-MCNC: 0.4 MG/DL (ref 0.2–1)
BUN SERPL-MCNC: 13 MG/DL (ref 6–20)
BUN/CREAT SERPL: 19 (ref 12–20)
CALCIUM SERPL-MCNC: 9 MG/DL (ref 8.5–10.1)
CHLORIDE SERPL-SCNC: 98 MMOL/L (ref 97–108)
CO2 SERPL-SCNC: 30 MMOL/L (ref 21–32)
CREAT SERPL-MCNC: 0.7 MG/DL (ref 0.55–1.02)
GLOBULIN SER CALC-MCNC: 3.6 G/DL (ref 2–4)
GLUCOSE SERPL-MCNC: 91 MG/DL (ref 65–100)
POTASSIUM SERPL-SCNC: 3.5 MMOL/L (ref 3.5–5.1)
PROT SERPL-MCNC: 7 G/DL (ref 6.4–8.2)
SODIUM SERPL-SCNC: 137 MMOL/L (ref 136–145)
VALPROATE SERPL-MCNC: 85 UG/ML (ref 50–100)

## 2018-06-08 PROCEDURE — 80053 COMPREHEN METABOLIC PANEL: CPT | Performed by: PSYCHIATRY & NEUROLOGY

## 2018-06-08 PROCEDURE — 80164 ASSAY DIPROPYLACETIC ACD TOT: CPT | Performed by: PSYCHIATRY & NEUROLOGY

## 2018-06-08 PROCEDURE — 74011250637 HC RX REV CODE- 250/637: Performed by: PSYCHIATRY & NEUROLOGY

## 2018-06-08 PROCEDURE — 36415 COLL VENOUS BLD VENIPUNCTURE: CPT | Performed by: PSYCHIATRY & NEUROLOGY

## 2018-06-08 RX ORDER — FUROSEMIDE 20 MG/1
20 TABLET ORAL DAILY
Qty: 15 TAB | Refills: 1 | Status: SHIPPED | OUTPATIENT
Start: 2018-06-08

## 2018-06-08 RX ORDER — SIMVASTATIN 20 MG/1
20 TABLET, FILM COATED ORAL
Qty: 15 TAB | Refills: 1 | Status: SHIPPED | OUTPATIENT
Start: 2018-06-08

## 2018-06-08 RX ORDER — ALBUTEROL SULFATE 90 UG/1
1-2 AEROSOL, METERED RESPIRATORY (INHALATION)
Qty: 1 INHALER | Refills: 0 | Status: SHIPPED | OUTPATIENT
Start: 2018-06-08

## 2018-06-08 RX ORDER — HALOPERIDOL 2 MG/ML
4 SOLUTION ORAL 3 TIMES DAILY
Qty: 60 ML | Refills: 0 | Status: SHIPPED | OUTPATIENT
Start: 2018-06-08

## 2018-06-08 RX ORDER — HALOPERIDOL DECANOATE 100 MG/ML
75 INJECTION INTRAMUSCULAR
Qty: 1 ML | Refills: 0 | Status: SHIPPED
Start: 2018-07-02

## 2018-06-08 RX ORDER — AMOXICILLIN 250 MG
2 CAPSULE ORAL DAILY
Qty: 30 TAB | Refills: 1 | Status: SHIPPED | OUTPATIENT
Start: 2018-06-08

## 2018-06-08 RX ORDER — LISINOPRIL 10 MG/1
10 TABLET ORAL DAILY
Qty: 15 TAB | Refills: 1 | Status: SHIPPED | OUTPATIENT
Start: 2018-06-09

## 2018-06-08 RX ORDER — VALPROIC ACID 250 MG/5ML
500 SOLUTION ORAL 2 TIMES DAILY
Qty: 300 ML | Refills: 1 | Status: SHIPPED | OUTPATIENT
Start: 2018-06-08

## 2018-06-08 RX ORDER — HALOPERIDOL DECANOATE 100 MG/ML
75 INJECTION INTRAMUSCULAR
Qty: 1 ML | Refills: 0 | Status: SHIPPED
Start: 2018-07-02 | End: 2018-06-08

## 2018-06-08 RX ORDER — LEVOTHYROXINE SODIUM 25 UG/1
25 TABLET ORAL
Qty: 15 TAB | Refills: 1 | Status: SHIPPED | OUTPATIENT
Start: 2018-06-09

## 2018-06-08 RX ADMIN — VALPROIC ACID 500 MG: 250 SOLUTION ORAL at 08:45

## 2018-06-08 RX ADMIN — LISINOPRIL 10 MG: 10 TABLET ORAL at 08:45

## 2018-06-08 RX ADMIN — HALOPERIDOL 4 MG: 2 SOLUTION ORAL at 13:08

## 2018-06-08 RX ADMIN — LEVOTHYROXINE SODIUM 25 MCG: 25 TABLET ORAL at 06:39

## 2018-06-08 RX ADMIN — FUROSEMIDE 20 MG: 20 TABLET ORAL at 08:45

## 2018-06-08 RX ADMIN — VALPROIC ACID 500 MG: 250 SOLUTION ORAL at 17:06

## 2018-06-08 RX ADMIN — HALOPERIDOL 4 MG: 2 SOLUTION ORAL at 08:45

## 2018-06-08 NOTE — DISCHARGE INSTRUCTIONS
DISCHARGE SUMMARY    NAME:Maryann Chinchilla  : 1945  MRN: [id number]    The patient Cherry Vargas exhibits the ability to control behavior in a less restrictive environment. Patient's level of functioning is improving. No assaultive/destructive behavior has been observed for the past 24 hours. No suicidal/homicidal threat or behavior has been observed for the past 24 hours. There is no evidence of serious medication side effects. Patient has not been in physical or protective restraints for at least the past 24 hours. If weapons involved, how are they secured? No weapons involved. Is patient aware of and in agreement with discharge plan? Yes    Arrangements for medication:  Prescriptions given to patient. Referral for substance abuse treatment? Patient is not using substances/Not applicable. Referral for smoking cessation needed? Patient is not a smoker/Not applicable. Copy of discharge instructions to provider?:  [place] for transportation home:  Medicaid taxi. Keep all follow up appointments as scheduled, continue to take prescribed medications per physician instructions. Mental health crisis number:  642 or your local mental health crisis line number at [phone]. DISCHARGE SUMMARY from Nurse    PATIENT INSTRUCTIONS:    What to do at Home:  Recommended activity: Activity as tolerated,     If you experience any of the following symptoms Anxiety, depression, thoughts about hurting yourself or others, please follow up with 911 or your local mental health crisis line number at [phone]. .    *  Please give a list of your current medications to your Primary Care Provider. *  Please update this list whenever your medications are discontinued, doses are      changed, or new medications (including over-the-counter products) are added. *  Please carry medication information at all times in case of emergency situations.     These are general instructions for a healthy lifestyle:    No smoking/ No tobacco products/ Avoid exposure to second hand smoke  Surgeon General's Warning:  Quitting smoking now greatly reduces serious risk to your health. Obesity, smoking, and sedentary lifestyle greatly increases your risk for illness    A healthy diet, regular physical exercise & weight monitoring are important for maintaining a healthy lifestyle    You may be retaining fluid if you have a history of heart failure or if you experience any of the following symptoms:  Weight gain of 3 pounds or more overnight or 5 pounds in a week, increased swelling in our hands or feet or shortness of breath while lying flat in bed. Please call your doctor as soon as you notice any of these symptoms; do not wait until your next office visit. Recognize signs and symptoms of STROKE:    F-face looks uneven    A-arms unable to move or move unevenly    S-speech slurred or non-existent    T-time-call 911 as soon as signs and symptoms begin-DO NOT go       Back to bed or wait to see if you get better-TIME IS BRAIN. Warning Signs of HEART ATTACK     Call 911 if you have these symptoms:   Chest discomfort. Most heart attacks involve discomfort in the center of the chest that lasts more than a few minutes, or that goes away and comes back. It can feel like uncomfortable pressure, squeezing, fullness, or pain.  Discomfort in other areas of the upper body. Symptoms can include pain or discomfort in one or both arms, the back, neck, jaw, or stomach.  Shortness of breath with or without chest discomfort.  Other signs may include breaking out in a cold sweat, nausea, or lightheadedness. Don't wait more than five minutes to call 911 - MINUTES MATTER! Fast action can save your life. Calling 911 is almost always the fastest way to get lifesaving treatment.  Emergency Medical Services staff can begin treatment when they arrive -- up to an hour sooner than if someone gets to the [facility] by car. The discharge information has been reviewed with the patient. The patient verbalized understanding. Discharge medications reviewed with the patient and appropriate educational materials and side effects teaching were provided.   ___________________________________________________________________________________________________________________________________

## 2018-06-08 NOTE — PROGRESS NOTES
Problem: Falls - Risk of  Goal: *Absence of Falls  Document Jovana Fall Risk and appropriate interventions in the flowsheet.    Outcome: Progressing Towards Goal  Fall Risk Interventions:  Lying quietly in bed with eyes closed , respirations even and unlabored , NAD noted   Q15 min safety rounds continue , uses walker , free of falls this admission   Mobility Interventions: Bed/chair exit alarm    Mentation Interventions: Adequate sleep, hydration, pain control    Medication Interventions: Bed/chair exit alarm    Elimination Interventions: Bed/chair exit alarm    History of Falls Interventions: Bed/chair exit alarm    0455- frequently coughs or clears throat and spits clear secretion no

## 2018-06-08 NOTE — BH NOTES
Behavioral Health Transition Record to Provider    Patient Name: Nereyda Ashley  YOB: 1945  Medical Record Number: [id number]  Date of Admission: 5/17/2018  Date of Discharge: 6/8/2018    Attending Provider: Ly Rain MD  Discharging Provider: Ly Rain MD  To contact this individual call [phone] and ask the  to page. If unavailable, ask to be transferred to [facility] Provider on call. [facility] Provider will be available on call 24/7 and during holidays. Primary Care Provider: Yas Ribeiro MD    No Known Allergies    Reason for Admission: Pt admitted under a temporary [facility] order (TDO)  For severe psychosis and dario proving to be an imminent danger to self and others and an inability to care for self. Admission Diagnosis: bipolar  Bipolar disorder ([facility] [address]    * No surgery found *    Results for orders placed or performed during the hospital encounter of 02/62/94   METABOLIC PANEL, COMPREHENSIVE   Result Value Ref Range    Sodium 137 136 - 145 mmol/L    Potassium 3.8 3.5 - 5.1 mmol/L    Chloride 102 97 - 108 mmol/L    CO2 28 21 - 32 mmol/L    Anion gap 7 5 - 15 mmol/L    Glucose 81 65 - 100 mg/dL    BUN 12 6 - 20 MG/DL    Creatinine 0.56 0.55 - 1.02 MG/DL    BUN/Creatinine ratio 21 (H) 12 - 20      GFR est AA >60 >60 ml/min/1.73m2    GFR est non-AA >60 >60 ml/min/1.73m2    Calcium 8.6 8.5 - 10.1 MG/DL    Bilirubin, total 0.2 0.2 - 1.0 MG/DL    ALT (SGPT) 20 12 - 78 U/L    AST (SGOT) 17 15 - 37 U/L    Alk.  phosphatase 95 45 - 117 U/L    Protein, total 6.0 (L) 6.4 - 8.2 g/dL    Albumin 3.0 (L) 3.5 - 5.0 g/dL    Globulin 3.0 2.0 - 4.0 g/dL    A-G Ratio 1.0 (L) 1.1 - 2.2     GLUCOSE, FASTING   Result Value Ref Range    Glucose 81 65 - 100 MG/DL   CBC W/O DIFF   Result Value Ref Range    WBC 3.2 (L) 3.6 - 11.0 K/uL    RBC 4.10 3.80 - 5.20 M/uL    HGB 12.1 11.5 - 16.0 g/dL    HCT 36.3 35.0 - 47.0 %    MCV 88.5 80.0 - 99.0 FL    MCH 29.5 26.0 - 34.0 PG MCHC 33.3 30.0 - 36.5 g/dL    RDW 13.2 11.5 - 14.5 %    PLATELET 301 543 - 131 K/uL    MPV 9.0 8.9 - 12.9 FL    NRBC 0.0 0  WBC    ABSOLUTE NRBC 0.00 0.00 - 0.01 K/uL   TSH 3RD GENERATION   Result Value Ref Range    TSH 1.11 0.36 - 3.74 uIU/mL   LIPID PANEL   Result Value Ref Range    LIPID PROFILE          Cholesterol, total 132 <200 MG/DL    Triglyceride 40 <150 MG/DL    HDL Cholesterol 73 MG/DL    LDL, calculated 51 0 - 100 MG/DL    VLDL, calculated 8 MG/DL    CHOL/HDL Ratio 1.8 0 - 5.0     VALPROIC ACID   Result Value Ref Range    Valproic acid 44 (L) 50 - 100 ug/ml   VALPROIC ACID   Result Value Ref Range    Valproic acid 71 50 - 100 ug/ml   HEPATIC FUNCTION PANEL   Result Value Ref Range    Protein, total 6.9 6.4 - 8.2 g/dL    Albumin 3.3 (L) 3.5 - 5.0 g/dL    Globulin 3.6 2.0 - 4.0 g/dL    A-G Ratio 0.9 (L) 1.1 - 2.2      Bilirubin, total 0.4 0.2 - 1.0 MG/DL    Bilirubin, direct 0.2 0.0 - 0.2 MG/DL    Alk. phosphatase 96 45 - 117 U/L    AST (SGOT) 20 15 - 37 U/L    ALT (SGPT) 22 12 - 78 U/L   VALPROIC ACID   Result Value Ref Range    Valproic acid 85 50 - 669 ug/ml   METABOLIC PANEL, COMPREHENSIVE   Result Value Ref Range    Sodium 137 136 - 145 mmol/L    Potassium 3.5 3.5 - 5.1 mmol/L    Chloride 98 97 - 108 mmol/L    CO2 30 21 - 32 mmol/L    Anion gap 9 5 - 15 mmol/L    Glucose 91 65 - 100 mg/dL    BUN 13 6 - 20 MG/DL    Creatinine 0.70 0.55 - 1.02 MG/DL    BUN/Creatinine ratio 19 12 - 20      GFR est AA >60 >60 ml/min/1.73m2    GFR est non-AA >60 >60 ml/min/1.73m2    Calcium 9.0 8.5 - 10.1 MG/DL    Bilirubin, total 0.4 0.2 - 1.0 MG/DL    ALT (SGPT) 20 12 - 78 U/L    AST (SGOT) 22 15 - 37 U/L    Alk. phosphatase 100 45 - 117 U/L    Protein, total 7.0 6.4 - 8.2 g/dL    Albumin 3.4 (L) 3.5 - 5.0 g/dL    Globulin 3.6 2.0 - 4.0 g/dL    A-G Ratio 0.9 (L) 1.1 - 2.2         Immunizations administered during this encounter: There is no immunization history on file for this patient.     Screening for Metabolic Disorders for Patients on Antipsychotic Medications  (Data obtained from the EMR)    Estimated Body Mass Index  Estimated body mass index is 30.87 kg/(m^2) as calculated from the following:    Height as of this encounter: 5' 1\" (1.549 m). Weight as of this encounter: 74.1 kg (163 lb 6.4 oz). Vital Signs/Blood Pressure  Visit Vitals    BP (!) 159/92 (BP 1 Location: Right arm, BP Patient Position: At rest;Sitting)    Pulse 90    Temp 98.2 °F (36.8 °C)    Resp 16    Ht 5' 1\" (1.549 m)    Wt 74.1 kg (163 lb 6.4 oz)    SpO2 95%    BMI 30.87 kg/m2       Blood Glucose/Hemoglobin A1c  Lab Results   Component Value Date/Time    Glucose 91 2018 05:40 AM    Glucose 81 2018 06:12 AM       No results found for: HBA1C, HGBE8, FSY0QOYH     Lipid Panel  Lab Results   Component Value Date/Time    Cholesterol, total 132 2018 06:12 AM    HDL Cholesterol 73 2018 06:12 AM    LDL, calculated 51 2018 06:12 AM    Triglyceride 40 2018 06:12 AM    CHOL/HDL Ratio 1.8 2018 06:12 AM        Discharge Diagnosis: Bipolar disorder (ICD-10-CM: F31.9)    Discharge Plan: Patient discharged home via Medicaid taxi. DISCHARGE SUMMARY    NAME:Maryann Harmon  : 1945  MRN: [id number]    The patient Lola Gilmore exhibits the ability to control behavior in a less restrictive environment. Patient's level of functioning is improving. No assaultive/destructive behavior has been observed for the past 24 hours. No suicidal/homicidal threat or behavior has been observed for the past 24 hours. There is no evidence of serious medication side effects. Patient has not been in physical or protective restraints for at least the past 24 hours. If weapons involved, how are they secured? No weapons involved. Is patient aware of and in agreement with discharge plan? Yes    Arrangements for medication:  Prescriptions given to patient. Referral for substance abuse treatment?   Patient is not using substances/Not applicable. Referral for smoking cessation needed? Patient is not a smoker/Not applicable. Copy of discharge instructions to provider?:  [facility] for transportation home:  Medicaid taxi. Keep all follow up appointments as scheduled, continue to take prescribed medications per physician instructions. Mental health crisis number:  [phone] or your local mental health crisis line number at [phone]. Discharge Medication List and Instructions:   Current Discharge Medication List      START taking these medications    Details   furosemide (LASIX) 20 mg tablet Take 1 Tab by mouth daily. Indications: Edema  Qty: 15 Tab, Refills: 1      haloperidol (HALDOL) 2 mg/mL oral concentrate Take 2 mL by mouth three (3) times daily. Indications: Psychotic Disorder  Qty: 60 mL, Refills: 0      lisinopril (PRINIVIL, ZESTRIL) 10 mg tablet Take 1 Tab by mouth daily. Indications: hypertension  Qty: 15 Tab, Refills: 1      valproic acid, as sodium salt, (DEPAKENE) 250 mg/5 mL (5 mL) soln oral solution Take 10 mL by mouth two (2) times a day. Indications: Bipolar Disorder  Qty: 300 mL, Refills: 1      haloperidol decanoate (HALDOL DECANOATE) 100 mg/mL injection 0.75 mL by IntraMUSCular route every twenty-eight (28) days. DUE June 22nd  Indications: schizoaffective disorder  Qty: 1 mL, Refills: 0         CONTINUE these medications which have CHANGED    Details   albuterol (PROVENTIL HFA, VENTOLIN HFA, PROAIR HFA) 90 mcg/actuation inhaler Take 1-2 Puffs by inhalation every four (4) hours as needed for Wheezing or Shortness of Breath. Indications: Acute Asthma Attack  Qty: 1 Inhaler, Refills: 0      levothyroxine (SYNTHROID) 25 mcg tablet Take 1 Tab by mouth Daily (before breakfast). Indications: hypothyroidism  Qty: 15 Tab, Refills: 1      senna-docusate (PERICOLACE) 8.6-50 mg per tablet Take 2 Tabs by mouth daily.  Indications: constipation  Qty: 30 Tab, Refills: 1      simvastatin (ZOCOR) 20 mg tablet Take 1 Tab by mouth nightly. Indications: hyperlipidemia  Qty: 15 Tab, Refills: 1         CONTINUE these medications which have NOT CHANGED    Details   ibuprofen (MOTRIN) 800 mg tablet Take 800 mg by mouth every eight (8) hours as needed for Pain. STOP taking these medications       chlorproMAZINE (THORAZINE) 200 mg tablet Comments:   Reason for Stopping:         chlorproMAZINE (THORAZINE) 200 mg tablet Comments:   Reason for Stopping:         haloperidol decanoate (HALDOL DECANOATE) 50 mg/mL injection Comments:   Reason for Stopping:         divalproex DR (DEPAKOTE) 250 mg tablet Comments:   Reason for Stopping:         melatonin tab tablet Comments:   Reason for Stopping:         losartan (COZAAR) 50 mg tablet Comments:   Reason for Stopping:         amLODIPine (NORVASC) 10 mg tablet Comments:   Reason for Stopping:               Unresulted Labs     None        To obtain results of studies pending at discharge, please contact [phone]    Follow-up Information     Follow up With Details Comments Contact Info    Ekaterina Garcia MD Schedule an appointment as soon as possible for a visit  [facility]   [phone]      [address] [place] [address] On 6/9/2018 Patient's PACT team will come see Patient in the home. [phone]          Advanced Directive:   Does the patient have an appointed surrogate decision maker? No  Does the patient have a Medical Advance Directive? No  Does the patient have a Psychiatric Advance Directive? No  If the patient does not have a surrogate or Medical Advance Directive AND Psychiatric Advance Directive, the patient was offered information on these advance directives Yes and Patient declined to complete    Patient Instructions: Please continue all medications until otherwise directed by physician.       Tobacco Cessation Discharge Plan:   Is the patient a smoker and needs referral for smoking cessation? No  Patient referred to the following for smoking cessation with an appointment? Refused     Patient was offered medication to assist with smoking cessation at discharge? Refused  Was education for smoking cessation added to the discharge instructions? Yes    Alcohol/Substance Abuse Discharge Plan:   Does the patient have a history of substance/alcohol abuse and requires a referral for treatment? No  Patient referred to the following for substance/alcohol abuse treatment with an appointment? Refused  Patient was offered medication to assist with alcohol cessation at discharge? Refused  Was education for substance/alcohol abuse added to discharge instructions? Yes    Patient discharged to Home; discussed with patient/caregiver and provided to the patient/caregiver either in hard copy or electronically.

## 2018-06-08 NOTE — BH NOTES
GROUP THERAPY PROGRESS NOTE    Abby Finn did not participate in a 50 minute Process Group on the Geriatric Unit with a focus on shifting ones feelings to a positive note and preparing for the day through group singing.

## 2018-06-08 NOTE — BH NOTES
Patient has been discharged from [facility]. A & O x 4. Reviewed Discharge instructions and prescriptions. She said \"I understand my discharge instructions and prescriptions\". All personal belongings, discharge instructions with prescription were returned to patient.

## 2018-06-08 NOTE — PROGRESS NOTES
Problem: Altered Thought Process (Adult/Pediatric)  Goal: *STG: Participates in treatment plan  Outcome: Progressing Towards Goal  Pt is resting In her room quietly   Med/Meal compliant   Less hyper verbal   No S/I   No signs of distress   Will continue to monitor. 1412: Discharge orders given by MD Sandor Tyson over discharge instructions with patient   Pt signed electronically  opportunity for questions given   Patient has none   PACT team will be at patients house tomorrow. Awaiting medications to be filled and medicaid taxi.

## 2018-06-08 NOTE — INTERDISCIPLINARY ROUNDS
Behavioral Health Interdisciplinary Rounds     Patient Name: James Dobbs  Age: 75 y.o. Room/Bed:  742/02  Primary Diagnosis: Bipolar disorder ([facility] [address]   Admission Status: Involuntary Commitment     Readmission within 30 days: no  Power of  in place: no  Patient requires a blocked bed: no          Reason for blocked bed:     VTE Prophylaxis: Not indicated    Mobility needs/Fall risk: yes - uses walker    Nutritional Plan: no  Consults:       Labs/Testing due today?: yes, cooperative w/ labs .  Tolerated well     Sleep hours: 7      Participation in Care/Groups: yes   Medication Compliant?: Yes  PRNS (last 24 hours): Sleep Aid    Restraints (last 24 hours):  no     CIWA (range last 24 hours):     COWS (range last 24 hours):      Alcohol screening (AUDIT) completed -   AUDIT Score: 0     If applicable, date SBIRT discussed in treatment team AND documented:     Tobacco - patient is a smoker: Have You Used Tobacco in the Past 30 Days: No  Illegal Drugs use: Have You Used Any Illegal Substances Over the Past 12 Months: No    24 hour chart check complete: yes     Patient goal(s) for today: Discharge  Treatment team focus/goals: Discharge  Progress note: Patient is ready for discharge    LOS:  22  Expected LOS: 22    Financial concerns/prescription coverage:  Medicare; Medicaid  Date of last family contact: 6/7 SW spoke to sister    Family requesting physician contact today: No  Discharge plan: Return home  Guns in the home: No      Outpatient provider(s): Landen GRISSOM    Participating treatment team members: KATHARINE Azul; Dr. Yaneth Randall MD; Charlene Eason RN

## 2018-06-08 NOTE — PROGRESS NOTES
Problem: Altered Thought Process (Adult/Pediatric)  Goal: *STG: Complies with medication therapy  Outcome: Progressing Towards Goal  Patient has been med compliant.

## 2018-06-18 NOTE — DISCHARGE SUMMARY
PSYCHIATRIC DISCHARGE SUMMARY         IDENTIFICATION:    Patient Name  Genevieve Mendoza   Date of Birth 1945   [facility] [id number]   Medical Record Number  [id number]      Age  75 y.o. PCP Dean Aguila MD   Admit date:  5/17/2018    Discharge date: 6/8/18   Room Number  742/02  @ . [facility]   Date of Service  6/8/18            TYPE OF DISCHARGE: REGULAR               CONDITION AT DISCHARGE: improved       PROVISIONAL & DISCHARGE DIAGNOSES:    Problem List  Date Reviewed: 12/27/2013          Codes Class    * (Principal)Bipolar disorder ([facility] [address] ICD-10-CM: F31.9  ICD-9-CM: 296.80         Schizoaffective disorder, unspecified condition (Chronic) ICD-10-CM: F25.9  ICD-9-CM: 295.70               Schizoaffective Disorder- bipolar type    DISCHARGE DIAGNOSIS:   Axis I:  SEE ABOVE  Axis II: SEE ABOVE  Axis III: SEE ABOVE  Axis IV:  Unable to live independently; lack of structure  Axis V:  45 on admission, 55 on discharge 65 (baseline)       CC & HISTORY OF PRESENT ILLNESS:  The patient, Genevieve Mendoza, is a 75 y.o. BLACK OR  female with a past psychiatric history significant for bipolar d/o , who presents at this time with complaints of (and/or evidence of) the following emotional symptoms: agitation, delusions and psychotic behavior. Additional symptomatology include agitation. The above symptoms have been present for 3 days . These symptoms are of severe severity. These symptoms are constant  in nature. The patient's condition has been precipitated by medication adjustments and psychosocial stressors (family conflicts  ). Patient's condition made worse by treatment noncompliance. UDS: MJ negative; BAL=0.      SOCIAL HISTORY:    Social History     Social History    Marital status:      Spouse name: N/A    Number of children: N/A    Years of education: N/A     Occupational History    Not on file.      Social History Main Topics    Smoking status: Never Smoker    Smokeless tobacco: Not on file    Alcohol use No    Drug use: No    Sexual activity: Not Currently     Other Topics Concern    Not on file     Social History Narrative    75year old AA female admitted on TDO for agitation, making threats and having a fixed delusion that she is . She has been making racial remarks and placing herself in danger. Pt has hx of treatment with Depakote, Thorazine and Haldol Dec.      FAMILY HISTORY:   No family history on file. HOSPITALIZATION COURSE:    Temitope Scanlon was admitted to the inpatient psychiatric unit [facility] for acute psychiatric stabilization in regards to symptomatology as described in the HPI above. The differential diagnosis at time of admission included: dementia vs. bipolar dis vs. schizoaffective vs. Schizophrenia. While on the unit Temitope Scanlon was involved in individual, group, occupational and milieu therapy. Psychiatric medications were adjusted during this hospitalization including  (see below). Temitope Scanlon demonstrated a slow, but progressive improvement in overall condition. Please see individual progress notes for more specific details regarding patient's hospitalization course. At time of discharge, Temitope Scanlon is without significant problems of psychosis or agitation. Patient free of suicidal and homicidal ideations (appears to be at very low risk of suicide or homicide) and reports many positive predictive factors in terms of not attempting suicide or homicide. Overall presentation at time of discharge is most consistent with the diagnosis of *Schizoaffective disorder. Patient with request for discharge today. There are no grounds to seek a TDO. Patient has maximized benefit to be derived from acute inpatient psychiatric treatment.   All members of the treatment team concur with each other in regards to plans for discharge today as per patient's request.  Patient and family are aware and in agreement with discharge and discharge plan. LABS AND IMAGING:    Labs Reviewed   METABOLIC PANEL, COMPREHENSIVE - Abnormal; Notable for the following:        Result Value    BUN/Creatinine ratio 21 (*)     Protein, total 6.0 (*)     Albumin 3.0 (*)     A-G Ratio 1.0 (*)     All other components within normal limits   CBC W/O DIFF - Abnormal; Notable for the following:     WBC 3.2 (*)     All other components within normal limits   VALPROIC ACID - Abnormal; Notable for the following:     Valproic acid 44 (*)     All other components within normal limits   HEPATIC FUNCTION PANEL - Abnormal; Notable for the following: Albumin 3.3 (*)     A-G Ratio 0.9 (*)     All other components within normal limits   METABOLIC PANEL, COMPREHENSIVE - Abnormal; Notable for the following: Albumin 3.4 (*)     A-G Ratio 0.9 (*)     All other components within normal limits   GLUCOSE, FASTING   TSH 3RD GENERATION   LIPID PANEL   VALPROIC ACID   VALPROIC ACID     Lab Results   Component Value Date/Time    Valproic acid 85 06/08/2018 05:40 AM     Admission on 05/17/2018, Discharged on 06/08/2018   Component Date Value Ref Range Status    Sodium 05/18/2018 137  136 - 145 mmol/L Final    Potassium 05/18/2018 3.8  3.5 - 5.1 mmol/L Final    Chloride 05/18/2018 102  97 - 108 mmol/L Final    CO2 05/18/2018 28  21 - 32 mmol/L Final    Anion gap 05/18/2018 7  5 - 15 mmol/L Final    Glucose 05/18/2018 81  65 - 100 mg/dL Final    BUN 05/18/2018 12  6 - 20 MG/DL Final    Creatinine 05/18/2018 0.56  0.55 - 1.02 MG/DL Final    BUN/Creatinine ratio 05/18/2018 21* 12 - 20   Final    GFR est AA 05/18/2018 >60  >60 ml/min/1.73m2 Final    GFR est non-AA 05/18/2018 >60  >60 ml/min/1.73m2 Final    Calcium 05/18/2018 8.6  8.5 - 10.1 MG/DL Final    Bilirubin, total 05/18/2018 0.2  0.2 - 1.0 MG/DL Final    ALT (SGPT) 05/18/2018 20  12 - 78 U/L Final    AST (SGOT) 05/18/2018 17  15 - 37 U/L Final    Alk.  phosphatase 05/18/2018 95  45 - 117 U/L Final    Protein, total 05/18/2018 6.0* 6.4 - 8.2 g/dL Final    Albumin 05/18/2018 3.0* 3.5 - 5.0 g/dL Final    Globulin 05/18/2018 3.0  2.0 - 4.0 g/dL Final    A-G Ratio 05/18/2018 1.0* 1.1 - 2.2   Final    Glucose 05/18/2018 81  65 - 100 MG/DL Final    WBC 05/18/2018 3.2* 3.6 - 11.0 K/uL Final    RBC 05/18/2018 4.10  3.80 - 5.20 M/uL Final    HGB 05/18/2018 12.1  11.5 - 16.0 g/dL Final    HCT 05/18/2018 36.3  35.0 - 47.0 % Final    MCV 05/18/2018 88.5  80.0 - 99.0 FL Final    MCH 05/18/2018 29.5  26.0 - 34.0 PG Final    MCHC 05/18/2018 33.3  30.0 - 36.5 g/dL Final    RDW 05/18/2018 13.2  11.5 - 14.5 % Final    PLATELET 88/18/0071 723  150 - 400 K/uL Final    MPV 05/18/2018 9.0  8.9 - 12.9 FL Final    NRBC 05/18/2018 0.0  0  WBC Final    ABSOLUTE NRBC 05/18/2018 0.00  0.00 - 0.01 K/uL Final    TSH 05/18/2018 1.11  0.36 - 3.74 uIU/mL Final    LIPID PROFILE 05/18/2018        Final    Cholesterol, total 05/18/2018 132  <200 MG/DL Final    Triglyceride 05/18/2018 40  <150 MG/DL Final    HDL Cholesterol 05/18/2018 73  MG/DL Final    LDL, calculated 05/18/2018 51  0 - 100 MG/DL Final    VLDL, calculated 05/18/2018 8  MG/DL Final    CHOL/HDL Ratio 05/18/2018 1.8  0 - 5.0   Final    Valproic acid 05/22/2018 44* 50 - 100 ug/ml Final    Valproic acid 06/01/2018 71  50 - 100 ug/ml Final    Protein, total 06/01/2018 6.9  6.4 - 8.2 g/dL Final    Albumin 06/01/2018 3.3* 3.5 - 5.0 g/dL Final    Globulin 06/01/2018 3.6  2.0 - 4.0 g/dL Final    A-G Ratio 06/01/2018 0.9* 1.1 - 2.2   Final    Bilirubin, total 06/01/2018 0.4  0.2 - 1.0 MG/DL Final    Bilirubin, direct 06/01/2018 0.2  0.0 - 0.2 MG/DL Final    Alk.  phosphatase 06/01/2018 96  45 - 117 U/L Final    AST (SGOT) 06/01/2018 20  15 - 37 U/L Final    ALT (SGPT) 06/01/2018 22  12 - 78 U/L Final    Valproic acid 06/08/2018 85  50 - 100 ug/ml Final    Sodium 06/08/2018 137  136 - 145 mmol/L Final    Potassium 06/08/2018 3.5  3.5 - 5.1 mmol/L Final    Chloride 06/08/2018 98  97 - 108 mmol/L Final    CO2 06/08/2018 30  21 - 32 mmol/L Final    Anion gap 06/08/2018 9  5 - 15 mmol/L Final    Glucose 06/08/2018 91  65 - 100 mg/dL Final    BUN 06/08/2018 13  6 - 20 MG/DL Final    Creatinine 06/08/2018 0.70  0.55 - 1.02 MG/DL Final    BUN/Creatinine ratio 06/08/2018 19  12 - 20   Final    GFR est AA 06/08/2018 >60  >60 ml/min/1.73m2 Final    GFR est non-AA 06/08/2018 >60  >60 ml/min/1.73m2 Final    Calcium 06/08/2018 9.0  8.5 - 10.1 MG/DL Final    Bilirubin, total 06/08/2018 0.4  0.2 - 1.0 MG/DL Final    ALT (SGPT) 06/08/2018 20  12 - 78 U/L Final    AST (SGOT) 06/08/2018 22  15 - 37 U/L Final    Alk. phosphatase 06/08/2018 100  45 - 117 U/L Final    Protein, total 06/08/2018 7.0  6.4 - 8.2 g/dL Final    Albumin 06/08/2018 3.4* 3.5 - 5.0 g/dL Final    Globulin 06/08/2018 3.6  2.0 - 4.0 g/dL Final    A-G Ratio 06/08/2018 0.9* 1.1 - 2.2   Final     No results found. DISPOSITION:    Home. Patient to f/u with psychiatric and psychotherapy appointments. Patient is to f/u with internist as directed. FOLLOW-UP CARE:    Activity as tolerated  Regular Diet  Wound Care: none needed. Follow-up Information     Follow up With Details Comments Contact Info    Festus Simmonds, MD Schedule an appointment as soon as possible for a visit  [address] [place] [address]       [address] On 6/9/2018 Patient's PACT team will come see Patient in the home. [address]  [phone]                 PROGNOSIS:   Good -- based on nature of patient's pathology/ies and treatment compliance issues. Prognosis is greatly dependent upon patient's ability to remain sober and to follow up with drug/etoh rehabilitation and psychiatric/psychotherapy appointments as well as to comply with psychiatric medications as prescribed. DISCHARGE MEDICATIONS:     Informed consent given for the use of following psychotropic medications:  Discharge Medication List as of 6/8/2018  1:09 PM      START taking these medications    Details   furosemide (LASIX) 20 mg tablet Take 1 Tab by mouth daily. Indications: Edema, Print, Disp-15 Tab, R-1      haloperidol (HALDOL) 2 mg/mL oral concentrate Take 2 mL by mouth three (3) times daily. Indications: Psychotic Disorder, Print, Disp-60 mL, R-0      lisinopril (PRINIVIL, ZESTRIL) 10 mg tablet Take 1 Tab by mouth daily. Indications: hypertension, Print, Disp-15 Tab, R-1      valproic acid, as sodium salt, (DEPAKENE) 250 mg/5 mL (5 mL) soln oral solution Take 10 mL by mouth two (2) times a day. Indications: Bipolar Disorder, Print, Disp-300 mL, R-1         CONTINUE these medications which have CHANGED    Details   albuterol (PROVENTIL HFA, VENTOLIN HFA, PROAIR HFA) 90 mcg/actuation inhaler Take 1-2 Puffs by inhalation every four (4) hours as needed for Wheezing or Shortness of Breath. Indications: Acute Asthma Attack, Print, Disp-1 Inhaler, R-0      levothyroxine (SYNTHROID) 25 mcg tablet Take 1 Tab by mouth Daily (before breakfast). Indications: hypothyroidism, Print, Disp-15 Tab, R-1      senna-docusate (PERICOLACE) 8.6-50 mg per tablet Take 2 Tabs by mouth daily. Indications: constipation, Print, Disp-30 Tab, R-1      simvastatin (ZOCOR) 20 mg tablet Take 1 Tab by mouth nightly. Indications: hyperlipidemia, Print, Disp-15 Tab, R-1      haloperidol decanoate (HALDOL DECANOATE) 100 mg/mL injection 0.75 mL by IntraMUSCular route every twenty-eight (28) days.  DUE June 22nd  Indications: schizoaffective disorder, No Print, Disp-1 mL, R-0         CONTINUE these medications which have NOT CHANGED    Details   ibuprofen (MOTRIN) 800 mg tablet Take 800 mg by mouth every eight (8) hours as needed for Pain., Historical Med         STOP taking these medications       chlorproMAZINE (THORAZINE) 200 mg tablet Comments:   Reason for Stopping:         chlorproMAZINE (THORAZINE) 200 mg tablet Comments:   Reason for Stopping:         haloperidol decanoate (HALDOL DECANOATE) 50 mg/mL injection Comments:   Reason for Stopping:         divalproex DR (DEPAKOTE) 250 mg tablet Comments:   Reason for Stopping:         melatonin tab tablet Comments:   Reason for Stopping:         losartan (COZAAR) 50 mg tablet Comments:   Reason for Stopping:         amLODIPine (NORVASC) 10 mg tablet Comments:   Reason for Stopping:                      A coordinated, multidisplinary treatment team round was conducted with Christiano Yap is done daily here at [facility]. This team consists of the nurse, psychiatric unit pharmcist,  and writer. I have spent greater than 35 minutes on discharge work.     Signed:  Priyanka Sanchez MD

## 2022-03-18 PROBLEM — F31.9 BIPOLAR DISORDER (HCC): Status: ACTIVE | Noted: 2018-05-17

## 2023-10-28 NOTE — INTERDISCIPLINARY ROUNDS
Behavioral Health Interdisciplinary Rounds     Patient Name: Fabian Rodas  Age: 75 y.o. Room/Bed:  742/01  Primary Diagnosis: Bipolar disorder ([facility] [address]   Admission Status: Involuntary Commitment     Readmission within 30 days: no  Power of  in place: no  Patient requires a blocked bed: yes          Reason for blocked bed: labile, disruptive, talks to self loudly     VTE Prophylaxis: Not indicated    Mobility needs/Fall risk: yes    Nutritional Plan: no  Consults: no         Labs/Testing due today?: yes - collected     Sleep hours: 3.0       Participation in Care/Groups:  yes  Medication Compliant?: Yes  PRNS (last 24 hours): Tessalon, Antipsychotic (PO)  Restraints (last 24 hours):  no     CIWA (range last 24 hours):     COWS (range last 24 hours):      Alcohol screening (AUDIT) completed -   AUDIT Score: 0     If applicable, date SBIRT discussed in treatment team AND documented:     Tobacco - patient is a smoker: Have You Used Tobacco in the Past 30 Days: No  Illegal Drugs use: Have You Used Any Illegal Substances Over the Past 12 Months: No    24 hour chart check complete: yes     Patient goal(s) for today:   Treatment team focus/goals: Plan to titrate her medications. Progress note -  She remains somewhat manic on the unit, but has pravin compliant with her medications and treatment.       LOS:  5  Expected LOS: TBD      Financial concerns/prescription coverage:    Date of last family contact:      Family requesting physician contact today:    Discharge plan: se will return home   Guns in the home:  nop      Outpatient provider(s): Landen GRISSOM     Participating treatment team members: DRE Man Dr., RN back

## 2023-12-01 NOTE — BH NOTES
2 = A lot of assistance Patient is on two antipsychotics now due to the relative refractoriness to treatment    thus far. Prior to admission patient had THREE or more failed trails of monotherapy, including: Geodon, Haldol and Thorazine. The patient is a very slow responder to medications. Risks and benefits in the use of two antipsychotics have been weighed in full, including the risk of metabolic syndrome and the potential increased risk of QTc  Based on this analysis, it is considered favorable for the utilization of two antipsychotics. In the future, once stable on the two antipsychotics, patient can possibly be tapered to one of the chosen antipsychotics. Will check on EKG results today to monitor QTc.

## 2024-11-24 ENCOUNTER — HOSPITAL ENCOUNTER (INPATIENT)
Facility: HOSPITAL | Age: 79
LOS: 11 days | Discharge: HOME | DRG: 885 | End: 2024-12-05
Attending: PSYCHIATRY & NEUROLOGY | Admitting: PSYCHIATRY & NEUROLOGY
Payer: MEDICARE

## 2024-11-24 PROBLEM — F31.9 BIPOLAR DISORDER (HCC): Status: RESOLVED | Noted: 2018-05-17 | Resolved: 2024-11-24

## 2024-11-24 PROBLEM — F39 MOOD DISORDER (HCC): Status: RESOLVED | Noted: 2024-11-24 | Resolved: 2024-11-24

## 2024-11-24 PROBLEM — F39 MOOD DISORDER (HCC): Status: ACTIVE | Noted: 2024-11-24

## 2024-11-24 PROCEDURE — 6370000000 HC RX 637 (ALT 250 FOR IP): Performed by: HOSPITALIST

## 2024-11-24 PROCEDURE — 1240000000 HC EMOTIONAL WELLNESS R&B

## 2024-11-24 PROCEDURE — 6370000000 HC RX 637 (ALT 250 FOR IP): Performed by: PSYCHIATRY & NEUROLOGY

## 2024-11-24 RX ORDER — ALBUTEROL SULFATE 90 UG/1
2 INHALANT RESPIRATORY (INHALATION) EVERY 4 HOURS PRN
Status: DISCONTINUED | OUTPATIENT
Start: 2024-11-24 | End: 2024-12-05 | Stop reason: HOSPADM

## 2024-11-24 RX ORDER — SENNA AND DOCUSATE SODIUM 50; 8.6 MG/1; MG/1
1 TABLET, FILM COATED ORAL DAILY PRN
COMMUNITY

## 2024-11-24 RX ORDER — SENNOSIDES A AND B 8.6 MG/1
1 TABLET, FILM COATED ORAL NIGHTLY
Status: DISCONTINUED | OUTPATIENT
Start: 2024-11-24 | End: 2024-12-03

## 2024-11-24 RX ORDER — DIVALPROEX SODIUM 250 MG/1
250 TABLET, DELAYED RELEASE ORAL 2 TIMES DAILY
Status: DISCONTINUED | OUTPATIENT
Start: 2024-11-24 | End: 2024-12-05 | Stop reason: HOSPADM

## 2024-11-24 RX ORDER — DIVALPROEX SODIUM 250 MG/1
250 TABLET, FILM COATED, EXTENDED RELEASE ORAL 2 TIMES DAILY
Status: ON HOLD | COMMUNITY
End: 2024-12-04

## 2024-11-24 RX ORDER — AMLODIPINE BESYLATE 10 MG/1
10 TABLET ORAL DAILY
Status: ON HOLD | COMMUNITY
End: 2024-12-04 | Stop reason: HOSPADM

## 2024-11-24 RX ORDER — LEVOTHYROXINE SODIUM 25 UG/1
25 TABLET ORAL DAILY
Status: ON HOLD | COMMUNITY
End: 2024-12-04

## 2024-11-24 RX ORDER — HALOPERIDOL 5 MG/1
5 TABLET ORAL EVERY 4 HOURS PRN
Status: DISCONTINUED | OUTPATIENT
Start: 2024-11-24 | End: 2024-12-05 | Stop reason: HOSPADM

## 2024-11-24 RX ORDER — HALOPERIDOL 5 MG/ML
5 INJECTION INTRAMUSCULAR EVERY 4 HOURS PRN
Status: DISCONTINUED | OUTPATIENT
Start: 2024-11-24 | End: 2024-12-05 | Stop reason: HOSPADM

## 2024-11-24 RX ORDER — ATORVASTATIN CALCIUM 10 MG/1
10 TABLET, FILM COATED ORAL NIGHTLY
Status: DISCONTINUED | OUTPATIENT
Start: 2024-11-24 | End: 2024-12-05 | Stop reason: HOSPADM

## 2024-11-24 RX ORDER — HYDROXYZINE HYDROCHLORIDE 50 MG/1
50 TABLET, FILM COATED ORAL 3 TIMES DAILY PRN
Status: DISCONTINUED | OUTPATIENT
Start: 2024-11-24 | End: 2024-12-05 | Stop reason: HOSPADM

## 2024-11-24 RX ORDER — DIPHENHYDRAMINE HYDROCHLORIDE 50 MG/ML
50 INJECTION INTRAMUSCULAR; INTRAVENOUS EVERY 4 HOURS PRN
Status: DISCONTINUED | OUTPATIENT
Start: 2024-11-24 | End: 2024-11-25

## 2024-11-24 RX ORDER — MAGNESIUM HYDROXIDE/ALUMINUM HYDROXICE/SIMETHICONE 120; 1200; 1200 MG/30ML; MG/30ML; MG/30ML
30 SUSPENSION ORAL EVERY 6 HOURS PRN
Status: DISCONTINUED | OUTPATIENT
Start: 2024-11-24 | End: 2024-12-05 | Stop reason: HOSPADM

## 2024-11-24 RX ORDER — RISPERIDONE 0.5 MG/1
0.5 TABLET ORAL DAILY
Status: ON HOLD | COMMUNITY
End: 2024-12-04 | Stop reason: HOSPADM

## 2024-11-24 RX ORDER — TRAZODONE HYDROCHLORIDE 50 MG/1
50 TABLET, FILM COATED ORAL NIGHTLY PRN
Status: DISCONTINUED | OUTPATIENT
Start: 2024-11-24 | End: 2024-12-05 | Stop reason: HOSPADM

## 2024-11-24 RX ORDER — DOCUSATE SODIUM 100 MG/1
100 CAPSULE, LIQUID FILLED ORAL DAILY
Status: DISCONTINUED | OUTPATIENT
Start: 2024-11-25 | End: 2024-12-05 | Stop reason: HOSPADM

## 2024-11-24 RX ORDER — SIMVASTATIN 20 MG
20 TABLET ORAL NIGHTLY
Status: ON HOLD | COMMUNITY
End: 2024-12-04

## 2024-11-24 RX ORDER — RISPERIDONE 0.5 MG/1
0.5 TABLET ORAL DAILY
Status: DISCONTINUED | OUTPATIENT
Start: 2024-11-24 | End: 2024-11-26

## 2024-11-24 RX ORDER — DIVALPROEX SODIUM 500 MG/1
500 TABLET, FILM COATED, EXTENDED RELEASE ORAL 2 TIMES DAILY
Status: ON HOLD | COMMUNITY
End: 2024-12-04

## 2024-11-24 RX ORDER — RISPERIDONE 1 MG/1
1 TABLET ORAL 2 TIMES DAILY
Status: ON HOLD | COMMUNITY
End: 2024-12-04 | Stop reason: HOSPADM

## 2024-11-24 RX ORDER — AMLODIPINE BESYLATE 10 MG/1
10 TABLET ORAL DAILY
Status: DISCONTINUED | OUTPATIENT
Start: 2024-11-25 | End: 2024-11-28

## 2024-11-24 RX ORDER — LEVOTHYROXINE SODIUM 50 UG/1
25 TABLET ORAL DAILY
Status: DISCONTINUED | OUTPATIENT
Start: 2024-11-25 | End: 2024-12-05 | Stop reason: HOSPADM

## 2024-11-24 RX ORDER — RISPERIDONE 1 MG/1
1 TABLET ORAL 2 TIMES DAILY
Status: DISCONTINUED | OUTPATIENT
Start: 2024-11-24 | End: 2024-11-26

## 2024-11-24 RX ORDER — ACETAMINOPHEN 325 MG/1
650 TABLET ORAL EVERY 4 HOURS PRN
Status: DISCONTINUED | OUTPATIENT
Start: 2024-11-24 | End: 2024-12-05 | Stop reason: HOSPADM

## 2024-11-24 RX ORDER — ALBUTEROL SULFATE 90 UG/1
2 INHALANT RESPIRATORY (INHALATION) EVERY 4 HOURS PRN
COMMUNITY

## 2024-11-24 RX ORDER — M-VIT,TX,IRON,MINS/CALC/FOLIC 27MG-0.4MG
1 TABLET ORAL DAILY
COMMUNITY

## 2024-11-24 RX ORDER — DIVALPROEX SODIUM 500 MG/1
500 TABLET, DELAYED RELEASE ORAL 2 TIMES DAILY
Status: DISCONTINUED | OUTPATIENT
Start: 2024-11-24 | End: 2024-12-05 | Stop reason: HOSPADM

## 2024-11-24 RX ORDER — M-VIT,TX,IRON,MINS/CALC/FOLIC 27MG-0.4MG
1 TABLET ORAL DAILY
Status: DISCONTINUED | OUTPATIENT
Start: 2024-11-25 | End: 2024-12-05 | Stop reason: HOSPADM

## 2024-11-24 RX ORDER — MECOBALAMIN 5000 MCG
10 TABLET,DISINTEGRATING ORAL NIGHTLY PRN
Status: DISCONTINUED | OUTPATIENT
Start: 2024-11-24 | End: 2024-12-05 | Stop reason: HOSPADM

## 2024-11-24 RX ADMIN — Medication 10 MG: at 20:49

## 2024-11-24 RX ADMIN — SENNOSIDES 8.6 MG: 8.6 TABLET, FILM COATED ORAL at 20:49

## 2024-11-24 RX ADMIN — HYDROXYZINE HYDROCHLORIDE 50 MG: 50 TABLET, FILM COATED ORAL at 17:54

## 2024-11-24 RX ADMIN — DIVALPROEX SODIUM 500 MG: 500 TABLET, DELAYED RELEASE ORAL at 20:49

## 2024-11-24 RX ADMIN — DIVALPROEX SODIUM 250 MG: 500 TABLET, DELAYED RELEASE ORAL at 20:48

## 2024-11-24 RX ADMIN — RISPERIDONE 1 MG: 0.5 TABLET, FILM COATED ORAL at 20:49

## 2024-11-24 RX ADMIN — ATORVASTATIN CALCIUM 10 MG: 10 TABLET, FILM COATED ORAL at 20:49

## 2024-11-24 RX ADMIN — HALOPERIDOL 5 MG: 5 TABLET ORAL at 17:54

## 2024-11-24 ASSESSMENT — SLEEP AND FATIGUE QUESTIONNAIRES
SLEEP PATTERN: DIFFICULTY FALLING ASLEEP
DO YOU HAVE DIFFICULTY SLEEPING: NO
DO YOU USE A SLEEP AID: YES
AVERAGE NUMBER OF SLEEP HOURS: -2

## 2024-11-24 ASSESSMENT — LIFESTYLE VARIABLES
HOW MANY STANDARD DRINKS CONTAINING ALCOHOL DO YOU HAVE ON A TYPICAL DAY: PATIENT DOES NOT DRINK
HOW OFTEN DO YOU HAVE A DRINK CONTAINING ALCOHOL: NEVER
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
## 2024-11-25 LAB
CHOLEST SERPL-MCNC: 154 MG/DL
EKG ATRIAL RATE: 65 BPM
EKG DIAGNOSIS: NORMAL
EKG P AXIS: 55 DEGREES
EKG P-R INTERVAL: 136 MS
EKG Q-T INTERVAL: 365 MS
EKG QRS DURATION: 84 MS
EKG QTC CALCULATION (BAZETT): 368 MS
EKG R AXIS: -11 DEGREES
EKG T AXIS: 13 DEGREES
EKG VENTRICULAR RATE: 61 BPM
EST. AVERAGE GLUCOSE BLD GHB EST-MCNC: 103 MG/DL
HBA1C MFR BLD: 5.2 % (ref 4–5.6)
HDLC SERPL-MCNC: 75 MG/DL
HDLC SERPL: 2.1 (ref 0–5)
LDLC SERPL CALC-MCNC: 65.2 MG/DL (ref 0–100)
LIPID PANEL: NORMAL
TRIGL SERPL-MCNC: 69 MG/DL
TSH SERPL DL<=0.05 MIU/L-ACNC: 2.75 UIU/ML (ref 0.36–3.74)
VALPROATE SERPL-MCNC: 42 UG/ML (ref 50–100)
VLDLC SERPL CALC-MCNC: 13.8 MG/DL

## 2024-11-25 PROCEDURE — 6370000000 HC RX 637 (ALT 250 FOR IP): Performed by: PSYCHIATRY & NEUROLOGY

## 2024-11-25 PROCEDURE — 84443 ASSAY THYROID STIM HORMONE: CPT

## 2024-11-25 PROCEDURE — 80061 LIPID PANEL: CPT

## 2024-11-25 PROCEDURE — 1240000000 HC EMOTIONAL WELLNESS R&B

## 2024-11-25 PROCEDURE — 80164 ASSAY DIPROPYLACETIC ACD TOT: CPT

## 2024-11-25 PROCEDURE — 83036 HEMOGLOBIN GLYCOSYLATED A1C: CPT

## 2024-11-25 PROCEDURE — 6370000000 HC RX 637 (ALT 250 FOR IP): Performed by: HOSPITALIST

## 2024-11-25 PROCEDURE — 6360000002 HC RX W HCPCS: Performed by: PSYCHIATRY & NEUROLOGY

## 2024-11-25 PROCEDURE — 36415 COLL VENOUS BLD VENIPUNCTURE: CPT

## 2024-11-25 PROCEDURE — 93005 ELECTROCARDIOGRAM TRACING: CPT | Performed by: HOSPITALIST

## 2024-11-25 RX ORDER — DIPHENHYDRAMINE HYDROCHLORIDE 50 MG/ML
25 INJECTION INTRAMUSCULAR; INTRAVENOUS EVERY 4 HOURS PRN
Status: DISCONTINUED | OUTPATIENT
Start: 2024-11-25 | End: 2024-12-05 | Stop reason: HOSPADM

## 2024-11-25 RX ADMIN — DOCUSATE SODIUM 100 MG: 100 CAPSULE, LIQUID FILLED ORAL at 08:19

## 2024-11-25 RX ADMIN — LEVOTHYROXINE SODIUM 25 MCG: 0.05 TABLET ORAL at 06:19

## 2024-11-25 RX ADMIN — RISPERIDONE 1 MG: 0.5 TABLET, FILM COATED ORAL at 08:32

## 2024-11-25 RX ADMIN — HYDROXYZINE HYDROCHLORIDE 50 MG: 50 TABLET, FILM COATED ORAL at 15:19

## 2024-11-25 RX ADMIN — RISPERIDONE 1 MG: 0.5 TABLET, FILM COATED ORAL at 20:54

## 2024-11-25 RX ADMIN — DIVALPROEX SODIUM 500 MG: 500 TABLET, DELAYED RELEASE ORAL at 08:34

## 2024-11-25 RX ADMIN — DIVALPROEX SODIUM 500 MG: 500 TABLET, DELAYED RELEASE ORAL at 20:53

## 2024-11-25 RX ADMIN — SENNOSIDES 8.6 MG: 8.6 TABLET, FILM COATED ORAL at 20:54

## 2024-11-25 RX ADMIN — ACETAMINOPHEN 650 MG: 325 TABLET ORAL at 15:19

## 2024-11-25 RX ADMIN — AMLODIPINE BESYLATE 10 MG: 10 TABLET ORAL at 08:19

## 2024-11-25 RX ADMIN — ATORVASTATIN CALCIUM 10 MG: 10 TABLET, FILM COATED ORAL at 20:54

## 2024-11-25 RX ADMIN — DIVALPROEX SODIUM 250 MG: 500 TABLET, DELAYED RELEASE ORAL at 20:55

## 2024-11-25 RX ADMIN — Medication 1 TABLET: at 08:31

## 2024-11-25 RX ADMIN — AMLODIPINE BESYLATE 10 MG: 10 TABLET ORAL at 08:34

## 2024-11-25 RX ADMIN — HALOPERIDOL LACTATE 5 MG: 5 INJECTION, SOLUTION INTRAMUSCULAR at 18:13

## 2024-11-25 RX ADMIN — DOCUSATE SODIUM 100 MG: 100 CAPSULE, LIQUID FILLED ORAL at 08:33

## 2024-11-25 RX ADMIN — DIPHENHYDRAMINE HYDROCHLORIDE 25 MG: 50 INJECTION INTRAMUSCULAR; INTRAVENOUS at 18:19

## 2024-11-25 RX ADMIN — DIVALPROEX SODIUM 250 MG: 500 TABLET, DELAYED RELEASE ORAL at 08:31

## 2024-11-25 RX ADMIN — RISPERIDONE 0.5 MG: 0.5 TABLET, FILM COATED ORAL at 08:20

## 2024-11-25 ASSESSMENT — PAIN SCALES - GENERAL: PAINLEVEL_OUTOF10: 4

## 2024-11-25 ASSESSMENT — PAIN DESCRIPTION - ORIENTATION: ORIENTATION: MID

## 2024-11-25 ASSESSMENT — PAIN DESCRIPTION - LOCATION: LOCATION: BACK

## 2024-11-25 ASSESSMENT — PAIN DESCRIPTION - DESCRIPTORS: DESCRIPTORS: ACHING

## 2024-11-25 NOTE — CARE COORDINATION
goal   Crisis/Safety/Discharge Plan   Crisis/Safety Plan Standard program interventions and protocol   Comprehensive Assessment Completion Date 11/25/24   Discharge Plan Home with outpatient

## 2024-11-25 NOTE — GROUP NOTE
Group Therapy Note    Date: 11/25/2024    Group Start Time: 1000  Group End Time: 1030  Group Topic: Community Meeting    [facility]    Caterina Gómez        Group Therapy Note    Attendees: 2         Patient's Goal:  Unknown    Notes:  Patient unable to comprehend    Status After Intervention:  Unchanged    Participation Level: Minimal    Participation Quality: Inappropriate      Speech:  pressured      Thought Process/Content: Delusional  Flight of ideas      Affective Functioning: Congruent      Mood: anxious and fearful      Level of consciousness:  Inattentive      Response to Learning: Resistant      Endings: None Reported    Modes of Intervention: Support      Discipline Responsible: Behavorial Health Tech      Signature:  Caterina Spain

## 2024-11-25 NOTE — H&P
INITIAL PSYCHIATRIC EVALUATION            IDENTIFICATION:    Patient Name  Svetlana Smith   Date of Birth 1945   [facility] [id number]   Medical Record Number  [id number]      Age  79 y.o.   PCP Toi Smith MD   Admit date:  11/24/2024    Room Number  105/01  @ [facility]   Date of Service  11/24/2024            HISTORY         REASON FOR HOSPITALIZATION:  CC: \"Do not know\".    HISTORY OF PRESENT ILLNESS:    The patient, Svetlana Smith, is a 79 y.o.  Black /  female with a past psychiatric history significant for mood disorder unspecified admitted to [facility] under TDO for worsening of behavior/psychosis.  Patient is a poor historian.  Patient not able to provide details related to the event that happened prior to come to the hospital.  Per chart review patient with some aggressive behaviors and delusions prior to coming to the hospital.  Stated that she is feeling good.  She denied any depressed or anxious mood.  Patient with some elevated mood and energy levels.  Denied any auditory or visual hallucinations.  Denied any suicidal or homicidal ideations.  Reported good sleep and appetite.  Denied any anxiety or panic attacks.   ALLERGIES: No Known Allergies   MEDICATIONS PRIOR TO ADMISSION:   Prior to Admission medications    Medication Sig Start Date End Date Taking? Authorizing Provider   amLODIPine (NORVASC) 10 MG tablet Take 1 tablet by mouth daily   Yes Sahil Sanchez MD   levothyroxine (SYNTHROID) 25 MCG tablet Take 1 tablet by mouth Daily   Yes Sahil Sanchez MD   simvastatin (ZOCOR) 20 MG tablet Take 1 tablet by mouth nightly   Yes Sahil Sanchez MD   divalproex (DEPAKOTE ER) 500 MG extended release tablet Take 1 tablet by mouth 2 times daily   Yes Sahil Sanchez MD   divalproex (DEPAKOTE ER) 250 MG extended release tablet Take 1 tablet by mouth 2 times daily Take with 500 mg   Yes Sahil Sanchez MD

## 2024-11-25 NOTE — GROUP NOTE
Group Therapy Note    Date: 11/25/2024    Group Start Time: 1400  Group End Time: 1415  Group Topic:  Group    [facility]    Paulina Schmitt        Group Therapy Note    Attendees: 0/0    Writer attempted process/psych-ed group. Patients too acute for group at this time.       Pt too acute for group.       Signature:  Paulina Schmitt

## 2024-11-26 PROCEDURE — 6370000000 HC RX 637 (ALT 250 FOR IP): Performed by: PSYCHIATRY & NEUROLOGY

## 2024-11-26 PROCEDURE — 1240000000 HC EMOTIONAL WELLNESS R&B

## 2024-11-26 PROCEDURE — 6370000000 HC RX 637 (ALT 250 FOR IP): Performed by: HOSPITALIST

## 2024-11-26 RX ORDER — RISPERIDONE 1 MG/1
1 TABLET ORAL DAILY
Status: DISCONTINUED | OUTPATIENT
Start: 2024-11-27 | End: 2024-12-05 | Stop reason: HOSPADM

## 2024-11-26 RX ORDER — RISPERIDONE 1 MG/1
2 TABLET ORAL NIGHTLY
Status: DISCONTINUED | OUTPATIENT
Start: 2024-11-26 | End: 2024-12-05 | Stop reason: HOSPADM

## 2024-11-26 RX ADMIN — DOCUSATE SODIUM 100 MG: 100 CAPSULE, LIQUID FILLED ORAL at 09:30

## 2024-11-26 RX ADMIN — TRAZODONE HYDROCHLORIDE 50 MG: 50 TABLET ORAL at 01:21

## 2024-11-26 RX ADMIN — AMLODIPINE BESYLATE 10 MG: 10 TABLET ORAL at 09:30

## 2024-11-26 RX ADMIN — LEVOTHYROXINE SODIUM 25 MCG: 0.05 TABLET ORAL at 07:07

## 2024-11-26 RX ADMIN — RISPERIDONE 0.5 MG: 0.5 TABLET, FILM COATED ORAL at 09:29

## 2024-11-26 RX ADMIN — DIVALPROEX SODIUM 250 MG: 500 TABLET, DELAYED RELEASE ORAL at 09:29

## 2024-11-26 RX ADMIN — HALOPERIDOL 5 MG: 5 TABLET ORAL at 09:30

## 2024-11-26 RX ADMIN — Medication 1 TABLET: at 09:30

## 2024-11-26 RX ADMIN — HYDROXYZINE HYDROCHLORIDE 50 MG: 50 TABLET, FILM COATED ORAL at 01:21

## 2024-11-26 RX ADMIN — DIVALPROEX SODIUM 500 MG: 500 TABLET, DELAYED RELEASE ORAL at 09:29

## 2024-11-26 RX ADMIN — RISPERIDONE 1 MG: 0.5 TABLET, FILM COATED ORAL at 09:28

## 2024-11-26 ASSESSMENT — PAIN SCALES - GENERAL: PAINLEVEL_OUTOF10: 0

## 2024-11-26 NOTE — GROUP NOTE
Group Therapy Note    Date: 11/25/2024    Group Start Time: 2000  Group End Time: 2045  Group Topic: Wrap-Up    [facility]    Efrain Snowden        Group Therapy Note    Attendees: 4         Patient's Goal:  n/a    Notes:  happy      Status After Intervention:  Improved    Participation Level: Active Listener    Participation Quality: Supportive      Speech:  normal      Thought Process/Content: Logical      Affective Functioning: Congruent      Mood:  happy      Level of consciousness:  Alert      Response to Learning: Able to verbalize current knowledge/experience      Endings: None Reported    Modes of Intervention: Socialization      Discipline Responsible: Behavorial Health Tech      Signature:  Efrain Snowden

## 2024-11-26 NOTE — GROUP NOTE
Group Therapy Note    Date: 11/25/2024    Group Start Time: 2000  Group End Time: 2045  Group Topic: Wrap-Up    [facility]    Efrain Snowden        Group Therapy Note    Attendees: 4         Patient's Goal:  n/a    Notes:  loud    Status After Intervention:  Improved    Participation Level: Active Listener    Participation Quality: Supportive      Speech:  normal      Thought Process/Content: Logical      Affective Functioning: Congruent      Mood:  loud      Level of consciousness:  Alert      Response to Learning: Able to verbalize current knowledge/experience      Endings: None Reported    Modes of Intervention: Socialization      Discipline Responsible: Behavorial Health Tech      Signature:  Efrain Snowden

## 2024-11-26 NOTE — GROUP NOTE
Group Therapy Note    Date: 11/26/2024    Group Start Time: 1415  Group End Time: 1500  Group Topic: Psychoeducation    [facility]    Paulina Schmitt        Group Therapy Note    Attendees: 4/5    Writer facilitated an inpatient psych-ed group. Writer provided a handout regarding perfectionism versus striving for excellence. Writer processed with pts the difference between perfectionist tendencies versus striving for excellence and encouraged patients to process. Writer held the space as patients processed. Writer concluded session with a grounding exercise.        Pt invited and encouraged to attend group but chose not to attend.         Signature:  Paulina Schmitt

## 2024-11-26 NOTE — GROUP NOTE
Group Therapy Note    Date: 11/26/2024    Group Start Time: 1330  Group End Time: 1415  Group Topic: Process Group - Inpatient    [facility]    Paulina Schmitt        Group Therapy Note    Attendees: 4/5    Writer facilitated an inpatient processing group. Writer had patients engage in a reflective expressive arts activity in which they were asked to identify \"masks\" they wear. Writer encouraged patients to discuss discharge plans, express any feelings they may be having, etc. Writer held the space as patients processed. Writer concluded session with a grounding exercise and encouraging patients to provide input and feedback regarding the group.            Pt walking in and out of group.     Signature:  Paulina Schmitt

## 2024-11-27 LAB — VALPROATE SERPL-MCNC: 42 UG/ML (ref 50–100)

## 2024-11-27 PROCEDURE — 80164 ASSAY DIPROPYLACETIC ACD TOT: CPT

## 2024-11-27 PROCEDURE — 6370000000 HC RX 637 (ALT 250 FOR IP): Performed by: HOSPITALIST

## 2024-11-27 PROCEDURE — 36415 COLL VENOUS BLD VENIPUNCTURE: CPT

## 2024-11-27 PROCEDURE — 1240000000 HC EMOTIONAL WELLNESS R&B

## 2024-11-27 PROCEDURE — 6370000000 HC RX 637 (ALT 250 FOR IP): Performed by: PSYCHIATRY & NEUROLOGY

## 2024-11-27 RX ORDER — BENZTROPINE MESYLATE 0.5 MG/1
0.5 TABLET ORAL 2 TIMES DAILY
Status: DISCONTINUED | OUTPATIENT
Start: 2024-11-27 | End: 2024-11-28

## 2024-11-27 RX ADMIN — AMLODIPINE BESYLATE 10 MG: 10 TABLET ORAL at 08:36

## 2024-11-27 RX ADMIN — ATORVASTATIN CALCIUM 10 MG: 10 TABLET, FILM COATED ORAL at 21:23

## 2024-11-27 RX ADMIN — DIVALPROEX SODIUM 500 MG: 500 TABLET, DELAYED RELEASE ORAL at 21:24

## 2024-11-27 RX ADMIN — DOCUSATE SODIUM 100 MG: 100 CAPSULE, LIQUID FILLED ORAL at 08:44

## 2024-11-27 RX ADMIN — LEVOTHYROXINE SODIUM 25 MCG: 0.05 TABLET ORAL at 06:30

## 2024-11-27 RX ADMIN — SENNOSIDES 8.6 MG: 8.6 TABLET, FILM COATED ORAL at 21:23

## 2024-11-27 RX ADMIN — DIVALPROEX SODIUM 250 MG: 500 TABLET, DELAYED RELEASE ORAL at 21:22

## 2024-11-27 RX ADMIN — DIVALPROEX SODIUM 250 MG: 500 TABLET, DELAYED RELEASE ORAL at 08:38

## 2024-11-27 RX ADMIN — RISPERIDONE 1 MG: 1 TABLET, FILM COATED ORAL at 08:36

## 2024-11-27 RX ADMIN — Medication 10 MG: at 21:25

## 2024-11-27 RX ADMIN — BENZTROPINE MESYLATE 0.5 MG: 0.5 TABLET ORAL at 21:22

## 2024-11-27 RX ADMIN — BENZTROPINE MESYLATE 0.5 MG: 0.5 TABLET ORAL at 12:39

## 2024-11-27 RX ADMIN — Medication 1 TABLET: at 08:36

## 2024-11-27 RX ADMIN — DIVALPROEX SODIUM 500 MG: 500 TABLET, DELAYED RELEASE ORAL at 08:37

## 2024-11-27 RX ADMIN — RISPERIDONE 2 MG: 1 TABLET, FILM COATED ORAL at 21:22

## 2024-11-27 NOTE — GROUP NOTE
Group Therapy Note    Date: 11/27/2024    Group Start Time: 1100  Group End Time: 1145  Group Topic: Nursing    [facility]    Machelle Young LPN        Group Therapy Note  Nursing group regarding Gratitude. Identified what they are grateful for and why. Explained how this impacts their life. Shared examples of each. Colored turkeys. Shared happy Thanksgiving memories.   Attendees: 5       Patient's Goal:  To be able to assist in group    Notes:  Pt was able to stay in group for short period of time.     Status After Intervention:  Unchanged    Participation Level: Minimal    Participation Quality: Inappropriate      Speech:  pressured      Thought Process/Content: Delusional      Affective Functioning: Incongruent      Mood: elevated      Level of consciousness:  Preoccupied      Response to Learning: Progressing to goal      Endings: None Reported    Modes of Intervention: Limit-setting      Discipline Responsible: Licensed Practical Nurse      Signature:  Machelle Young LPN

## 2024-11-27 NOTE — GROUP NOTE
Group Therapy Note    Date: 11/27/2024    Group Start Time: 1345  Group End Time: 1510  Group Topic:  Group    [facility]    Paulina Schmitt        Group Therapy Note    Attendees: 2/5    Writer facilitated an inpatient processing/psych-ed group combo. Writer provided a handout regarding the various love languages (words of affirmation, etc.) and encouraged patients to reflect and process on ways they can give these things to themselves. Writer encouraged patients to discuss discharge plans, express feelings, etc. Writer held the space as patients processed. Writer concluded session with a grounding exercise and encouraging patients to provide input and feedback regarding the group.        Pt sleeping during group.         Signature:  Paulina Schmitt

## 2024-11-28 PROCEDURE — 6370000000 HC RX 637 (ALT 250 FOR IP): Performed by: FAMILY MEDICINE

## 2024-11-28 PROCEDURE — 6370000000 HC RX 637 (ALT 250 FOR IP): Performed by: HOSPITALIST

## 2024-11-28 PROCEDURE — 6370000000 HC RX 637 (ALT 250 FOR IP): Performed by: PSYCHIATRY & NEUROLOGY

## 2024-11-28 PROCEDURE — 1240000000 HC EMOTIONAL WELLNESS R&B

## 2024-11-28 RX ORDER — BENZTROPINE MESYLATE 1 MG/1
1 TABLET ORAL DAILY
Status: DISCONTINUED | OUTPATIENT
Start: 2024-11-29 | End: 2024-12-05 | Stop reason: HOSPADM

## 2024-11-28 RX ORDER — FAMOTIDINE 10 MG
10 TABLET ORAL 2 TIMES DAILY
Status: DISCONTINUED | OUTPATIENT
Start: 2024-11-28 | End: 2024-12-05 | Stop reason: HOSPADM

## 2024-11-28 RX ADMIN — Medication 10 MG: at 21:23

## 2024-11-28 RX ADMIN — DIVALPROEX SODIUM 250 MG: 500 TABLET, DELAYED RELEASE ORAL at 21:20

## 2024-11-28 RX ADMIN — DIVALPROEX SODIUM 250 MG: 500 TABLET, DELAYED RELEASE ORAL at 08:45

## 2024-11-28 RX ADMIN — Medication 1 TABLET: at 08:43

## 2024-11-28 RX ADMIN — ATORVASTATIN CALCIUM 10 MG: 10 TABLET, FILM COATED ORAL at 21:21

## 2024-11-28 RX ADMIN — FAMOTIDINE 10 MG: 10 TABLET ORAL at 08:43

## 2024-11-28 RX ADMIN — FAMOTIDINE 10 MG: 10 TABLET ORAL at 21:20

## 2024-11-28 RX ADMIN — RISPERIDONE 2 MG: 1 TABLET, FILM COATED ORAL at 21:21

## 2024-11-28 RX ADMIN — LEVOTHYROXINE SODIUM 25 MCG: 0.05 TABLET ORAL at 05:36

## 2024-11-28 RX ADMIN — DIVALPROEX SODIUM 500 MG: 500 TABLET, DELAYED RELEASE ORAL at 08:43

## 2024-11-28 RX ADMIN — DIVALPROEX SODIUM 500 MG: 500 TABLET, DELAYED RELEASE ORAL at 21:21

## 2024-11-28 RX ADMIN — RISPERIDONE 1 MG: 1 TABLET, FILM COATED ORAL at 08:43

## 2024-11-28 RX ADMIN — BENZTROPINE MESYLATE 0.5 MG: 0.5 TABLET ORAL at 08:43

## 2024-11-28 RX ADMIN — DOCUSATE SODIUM 100 MG: 100 CAPSULE, LIQUID FILLED ORAL at 08:43

## 2024-11-28 NOTE — GROUP NOTE
Group Therapy Note    Date: 11/28/2024    Group Start Time: 0830  Group End Time: 0915  Group Topic: Community Meeting    [facility]    Kina ePck LPN        Group Therapy Note    Attendees: 5/5       Patient's Goal:  not sure today    Notes:  Community meeting:  with assist pt. Filled out paper, stating she feels her progress was at 10 and her depression and anxiety was at a 3, no thoughts of harming herself.  Pt. Smiling, redirectable    Status After Intervention:  Unchanged    Participation Level: Active Listener and Interactive    Participation Quality: Appropriate, Attentive, and Sharing      Speech:  normal      Thought Process/Content: Logical      Affective Functioning: Congruent      Mood: euthymic      Level of consciousness:  Alert, Oriented x4, and Attentive      Response to Learning: Able to retain information, Capable of insight, and Progressing to goal      Endings: None Reported    Modes of Intervention: Education      Discipline Responsible: Licensed Practical Nurse      Signature:  Kina Peck LPN

## 2024-11-29 PROCEDURE — 6370000000 HC RX 637 (ALT 250 FOR IP): Performed by: PSYCHIATRY & NEUROLOGY

## 2024-11-29 PROCEDURE — 6370000000 HC RX 637 (ALT 250 FOR IP): Performed by: FAMILY MEDICINE

## 2024-11-29 PROCEDURE — 6370000000 HC RX 637 (ALT 250 FOR IP): Performed by: HOSPITALIST

## 2024-11-29 PROCEDURE — 1240000000 HC EMOTIONAL WELLNESS R&B

## 2024-11-29 RX ADMIN — FAMOTIDINE 10 MG: 10 TABLET ORAL at 08:43

## 2024-11-29 RX ADMIN — RISPERIDONE 1 MG: 1 TABLET, FILM COATED ORAL at 08:43

## 2024-11-29 RX ADMIN — FAMOTIDINE 10 MG: 10 TABLET ORAL at 20:27

## 2024-11-29 RX ADMIN — DIVALPROEX SODIUM 500 MG: 500 TABLET, DELAYED RELEASE ORAL at 08:44

## 2024-11-29 RX ADMIN — LEVOTHYROXINE SODIUM 25 MCG: 0.05 TABLET ORAL at 05:43

## 2024-11-29 RX ADMIN — Medication 1 TABLET: at 08:43

## 2024-11-29 RX ADMIN — DIVALPROEX SODIUM 250 MG: 500 TABLET, DELAYED RELEASE ORAL at 20:27

## 2024-11-29 RX ADMIN — Medication 10 MG: at 20:27

## 2024-11-29 RX ADMIN — DOCUSATE SODIUM 100 MG: 100 CAPSULE, LIQUID FILLED ORAL at 08:43

## 2024-11-29 RX ADMIN — HYDROXYZINE HYDROCHLORIDE 50 MG: 50 TABLET, FILM COATED ORAL at 23:29

## 2024-11-29 RX ADMIN — TRAZODONE HYDROCHLORIDE 50 MG: 50 TABLET ORAL at 23:29

## 2024-11-29 RX ADMIN — ATORVASTATIN CALCIUM 10 MG: 10 TABLET, FILM COATED ORAL at 20:27

## 2024-11-29 RX ADMIN — RISPERIDONE 2 MG: 1 TABLET, FILM COATED ORAL at 20:26

## 2024-11-29 RX ADMIN — DIVALPROEX SODIUM 250 MG: 500 TABLET, DELAYED RELEASE ORAL at 08:44

## 2024-11-29 RX ADMIN — DIVALPROEX SODIUM 500 MG: 500 TABLET, DELAYED RELEASE ORAL at 20:28

## 2024-11-29 RX ADMIN — BENZTROPINE MESYLATE 1 MG: 1 TABLET ORAL at 08:45

## 2024-11-29 ASSESSMENT — PAIN SCALES - GENERAL
PAINLEVEL_OUTOF10: 0

## 2024-11-29 NOTE — GROUP NOTE
Group Therapy Note    Date: 11/29/2024    Group Start Time: 0800  Group End Time: 0845  Group Topic: Wrap-Up    [facility]    Leana Uribe        Group Therapy Note    Attendees: 4       Patient's Goal:  N/A    Notes:  N/A    Status After Intervention:  Improved    Participation Level: Active Listener    Participation Quality: Appropriate      Speech:  normal      Thought Process/Content: Logical      Affective Functioning: Congruent      Mood: anxious      Level of consciousness:  Alert      Response to Learning: Able to verbalize current knowledge/experience      Endings: None Reported    Modes of Intervention: Support      Discipline Responsible: Behavorial Health Tech      Signature:  Leana Uribe

## 2024-11-29 NOTE — GROUP NOTE
Group Therapy Note    Date: 11/29/2024    Group Start Time: 1315  Group End Time: 1330  Group Topic:  Group    [facility]    Paulina Schmitt        Group Therapy Note    Attendees: 0/3    Writer attempted process/psych-ed group.      Pt invited and encouraged to attend group but chose not to attend. Pt sleeping. Collateral stated pt did not sleep last night.         Signature:  Paulina Schmitt

## 2024-11-29 NOTE — GROUP NOTE
Group Therapy Note    Date: 11/29/2024    Group Start Time: 1015  Group End Time: 1100  Group Topic: Community Meeting    [facility]    Stephanie Pereira LPN        Group Therapy Note    Attendees: 3       Patient's Goal:  TO WATCH TV    Notes:  COMMUNITY    Status After Intervention:  Improved    Participation Level: Active Listener    Participation Quality: Appropriate and Attentive      Speech:  GARBLED      Thought Process/Content: Logical      Affective Functioning: Congruent      Mood: elevated      Level of consciousness:  Alert and Oriented x4      Response to Learning: Able to verbalize current knowledge/experience and Progressing to goal      Endings: None Reported    Modes of Intervention: Support      Discipline Responsible: Licensed Practical Nurse and Behavorial Health Tech      Signature:  Stephanie Pereira LPN

## 2024-11-29 NOTE — GROUP NOTE
Group Therapy Note    Date: 11/29/2024    Group Start Time: 1000  Group End Time: 1100  Group Topic: Medication    [facility]    Candice Dumont, RN        Group Therapy Note    Attendees: 3       Patient's Goal:  To go home soon    Notes:  Pt is taking meds well and says she will continue to do so.    Status After Intervention:  Improved    Participation Level: Interactive    Participation Quality: Appropriate      Speech:  normal      Thought Process/Content: Logical      Affective Functioning: Congruent      Mood:  Pleasant      Level of consciousness:  Alert and Oriented x4      Response to Learning: Able to verbalize current knowledge/experience      Endings: None Reported    Modes of Intervention: Education      Discipline Responsible: Registered Nurse      Signature:  Candice Dumont RN

## 2024-11-29 NOTE — GROUP NOTE
Group Therapy Note    Date: 11/29/2024    Group Start Time: 0830  Group End Time: 0915  Group Topic: Nursing    [facility]    Stephanie Pereira LPN        Group Therapy Note    Attendees: 3       Patient's Goal:  TO TAKE MEDS.    Notes:  PT. IS MEDICATION COMPLIANT.    Status After Intervention:  Improved    Participation Level: Active Listener    Participation Quality: Appropriate and Attentive      Speech:  normal      Thought Process/Content: Logical      Affective Functioning: Congruent      Mood:  CALM      Level of consciousness:  Alert and Oriented x4      Response to Learning: Able to verbalize current knowledge/experience, Able to retain information, Capable of insight, and Progressing to goal      Endings: None Reported    Modes of Intervention: Education      Discipline Responsible: Licensed Practical Nurse      Signature:  Stephanie Pereira LPN

## 2024-11-30 PROCEDURE — 6370000000 HC RX 637 (ALT 250 FOR IP): Performed by: PSYCHIATRY & NEUROLOGY

## 2024-11-30 PROCEDURE — 6370000000 HC RX 637 (ALT 250 FOR IP): Performed by: HOSPITALIST

## 2024-11-30 PROCEDURE — 6370000000 HC RX 637 (ALT 250 FOR IP): Performed by: FAMILY MEDICINE

## 2024-11-30 PROCEDURE — 1240000000 HC EMOTIONAL WELLNESS R&B

## 2024-11-30 RX ADMIN — DIVALPROEX SODIUM 250 MG: 500 TABLET, DELAYED RELEASE ORAL at 08:16

## 2024-11-30 RX ADMIN — ATORVASTATIN CALCIUM 10 MG: 10 TABLET, FILM COATED ORAL at 21:16

## 2024-11-30 RX ADMIN — RISPERIDONE 1 MG: 1 TABLET, FILM COATED ORAL at 08:16

## 2024-11-30 RX ADMIN — RISPERIDONE 2 MG: 1 TABLET, FILM COATED ORAL at 21:16

## 2024-11-30 RX ADMIN — DIVALPROEX SODIUM 500 MG: 500 TABLET, DELAYED RELEASE ORAL at 21:16

## 2024-11-30 RX ADMIN — Medication 1 TABLET: at 08:16

## 2024-11-30 RX ADMIN — DIVALPROEX SODIUM 500 MG: 500 TABLET, DELAYED RELEASE ORAL at 08:17

## 2024-11-30 RX ADMIN — DIVALPROEX SODIUM 250 MG: 500 TABLET, DELAYED RELEASE ORAL at 21:16

## 2024-11-30 RX ADMIN — DOCUSATE SODIUM 100 MG: 100 CAPSULE, LIQUID FILLED ORAL at 08:16

## 2024-11-30 RX ADMIN — SENNOSIDES 8.6 MG: 8.6 TABLET, FILM COATED ORAL at 21:16

## 2024-11-30 RX ADMIN — FAMOTIDINE 10 MG: 10 TABLET ORAL at 08:16

## 2024-11-30 RX ADMIN — LEVOTHYROXINE SODIUM 25 MCG: 0.05 TABLET ORAL at 06:24

## 2024-11-30 RX ADMIN — FAMOTIDINE 10 MG: 10 TABLET ORAL at 21:16

## 2024-11-30 RX ADMIN — BENZTROPINE MESYLATE 1 MG: 1 TABLET ORAL at 08:16

## 2024-11-30 NOTE — GROUP NOTE
Group Therapy Note    Date: 11/30/2024    Group Start Time: 1000  Group End Time: 1100  Group Topic: Community Meeting    [facility]    Caterina Gómez        Group Therapy Note    Attendees: Two (2)       Patient's Goal:  None    Notes:  Patient sis not attend group and has not  participated  in activities    Status After Intervention:  Unchanged    Participation Level: None    Participation Quality: Resistant      Speech:  hesitant      Thought Process/Content: Delusional  Flight of ideas      Affective Functioning: Congruent      Mood: irritable      Level of consciousness:  Preoccupied and Inattentive      Response to Learning: Progressing to goal      Endings: None Reported    Modes of Intervention: Support        Discipline Responsible: Behavorial Health Tech      Signature:  Caterina Spain

## 2024-11-30 NOTE — GROUP NOTE
Group Therapy Note    Date: 11/29/2024    Group Start Time: 2000  Group End Time: 2045  Group Topic: Wrap-Up    [facility]    Efrain Snowden        Group Therapy Note    Attendees: 3         Patient's Goal:  n/a    Notes:  happy    Status After Intervention:  Improved    Participation Level: Active Listener    Participation Quality: Supportive      Speech:  loud and talkive      Thought Process/Content: Logical      Affective Functioning: Congruent      Mood:  happy      Level of consciousness:  Alert      Response to Learning: Able to verbalize current knowledge/experience      Endings: None Reported    Modes of Intervention: Socialization      Discipline Responsible: Behavorial Health Tech      Signature:  Efrain Snowden

## 2024-12-01 PROCEDURE — 6370000000 HC RX 637 (ALT 250 FOR IP): Performed by: FAMILY MEDICINE

## 2024-12-01 PROCEDURE — 6370000000 HC RX 637 (ALT 250 FOR IP): Performed by: PSYCHIATRY & NEUROLOGY

## 2024-12-01 PROCEDURE — 6370000000 HC RX 637 (ALT 250 FOR IP): Performed by: HOSPITALIST

## 2024-12-01 PROCEDURE — 1240000000 HC EMOTIONAL WELLNESS R&B

## 2024-12-01 RX ADMIN — SENNOSIDES 8.6 MG: 8.6 TABLET, FILM COATED ORAL at 20:39

## 2024-12-01 RX ADMIN — FAMOTIDINE 10 MG: 10 TABLET ORAL at 20:39

## 2024-12-01 RX ADMIN — DIVALPROEX SODIUM 250 MG: 500 TABLET, DELAYED RELEASE ORAL at 20:39

## 2024-12-01 RX ADMIN — LEVOTHYROXINE SODIUM 25 MCG: 0.05 TABLET ORAL at 06:13

## 2024-12-01 RX ADMIN — DOCUSATE SODIUM 100 MG: 100 CAPSULE, LIQUID FILLED ORAL at 09:00

## 2024-12-01 RX ADMIN — DIVALPROEX SODIUM 250 MG: 500 TABLET, DELAYED RELEASE ORAL at 09:01

## 2024-12-01 RX ADMIN — DIVALPROEX SODIUM 500 MG: 500 TABLET, DELAYED RELEASE ORAL at 20:38

## 2024-12-01 RX ADMIN — Medication 1 TABLET: at 09:01

## 2024-12-01 RX ADMIN — RISPERIDONE 1 MG: 1 TABLET, FILM COATED ORAL at 09:01

## 2024-12-01 RX ADMIN — RISPERIDONE 2 MG: 1 TABLET, FILM COATED ORAL at 20:39

## 2024-12-01 RX ADMIN — BENZTROPINE MESYLATE 1 MG: 1 TABLET ORAL at 09:00

## 2024-12-01 RX ADMIN — ATORVASTATIN CALCIUM 10 MG: 10 TABLET, FILM COATED ORAL at 20:39

## 2024-12-01 RX ADMIN — FAMOTIDINE 10 MG: 10 TABLET ORAL at 09:01

## 2024-12-01 RX ADMIN — DIVALPROEX SODIUM 500 MG: 500 TABLET, DELAYED RELEASE ORAL at 09:01

## 2024-12-01 ASSESSMENT — PAIN SCALES - GENERAL: PAINLEVEL_OUTOF10: 0

## 2024-12-02 PROCEDURE — 6370000000 HC RX 637 (ALT 250 FOR IP): Performed by: FAMILY MEDICINE

## 2024-12-02 PROCEDURE — 6370000000 HC RX 637 (ALT 250 FOR IP): Performed by: HOSPITALIST

## 2024-12-02 PROCEDURE — 90656 IIV3 VACC NO PRSV 0.5 ML IM: CPT | Performed by: PSYCHIATRY & NEUROLOGY

## 2024-12-02 PROCEDURE — 1240000000 HC EMOTIONAL WELLNESS R&B

## 2024-12-02 PROCEDURE — 6360000002 HC RX W HCPCS: Performed by: PSYCHIATRY & NEUROLOGY

## 2024-12-02 PROCEDURE — G0008 ADMIN INFLUENZA VIRUS VAC: HCPCS | Performed by: PSYCHIATRY & NEUROLOGY

## 2024-12-02 PROCEDURE — 6370000000 HC RX 637 (ALT 250 FOR IP): Performed by: PSYCHIATRY & NEUROLOGY

## 2024-12-02 RX ORDER — CETIRIZINE HYDROCHLORIDE 5 MG/1
5 TABLET ORAL DAILY
Status: DISCONTINUED | OUTPATIENT
Start: 2024-12-02 | End: 2024-12-05 | Stop reason: HOSPADM

## 2024-12-02 RX ORDER — GUAIFENESIN 600 MG/1
600 TABLET, EXTENDED RELEASE ORAL 2 TIMES DAILY PRN
Status: DISCONTINUED | OUTPATIENT
Start: 2024-12-02 | End: 2024-12-05 | Stop reason: HOSPADM

## 2024-12-02 RX ADMIN — TRAZODONE HYDROCHLORIDE 50 MG: 50 TABLET ORAL at 20:40

## 2024-12-02 RX ADMIN — DOCUSATE SODIUM 100 MG: 100 CAPSULE, LIQUID FILLED ORAL at 07:22

## 2024-12-02 RX ADMIN — Medication 1 TABLET: at 07:22

## 2024-12-02 RX ADMIN — ATORVASTATIN CALCIUM 10 MG: 10 TABLET, FILM COATED ORAL at 20:39

## 2024-12-02 RX ADMIN — RISPERIDONE 2 MG: 1 TABLET, FILM COATED ORAL at 20:40

## 2024-12-02 RX ADMIN — DIVALPROEX SODIUM 500 MG: 500 TABLET, DELAYED RELEASE ORAL at 07:23

## 2024-12-02 RX ADMIN — LEVOTHYROXINE SODIUM 25 MCG: 0.05 TABLET ORAL at 06:06

## 2024-12-02 RX ADMIN — FAMOTIDINE 10 MG: 10 TABLET ORAL at 07:22

## 2024-12-02 RX ADMIN — FAMOTIDINE 10 MG: 10 TABLET ORAL at 20:39

## 2024-12-02 RX ADMIN — DIVALPROEX SODIUM 250 MG: 500 TABLET, DELAYED RELEASE ORAL at 20:39

## 2024-12-02 RX ADMIN — BENZTROPINE MESYLATE 1 MG: 1 TABLET ORAL at 07:22

## 2024-12-02 RX ADMIN — CETIRIZINE HYDROCHLORIDE 5 MG: 5 TABLET ORAL at 17:37

## 2024-12-02 RX ADMIN — RISPERIDONE 1 MG: 1 TABLET, FILM COATED ORAL at 07:22

## 2024-12-02 RX ADMIN — INFLUENZA A VIRUS A/VICTORIA/4897/2022 IVR-238 (H1N1) ANTIGEN (PROPIOLACTONE INACTIVATED), INFLUENZA A VIRUS A/THAILAND/8/2022 IVR-237 (H3N2) ANTIGEN (PROPIOLACTONE INACTIVATED), INFLUENZA B VIRUS B/AUSTRIA/1359417/2021 BVR-26 ANTIGEN (PROPIOLACTONE INACTIVATED) 0.5 ML: 15; 15; 15 INJECTION, SUSPENSION INTRAMUSCULAR at 12:45

## 2024-12-02 RX ADMIN — DIVALPROEX SODIUM 500 MG: 500 TABLET, DELAYED RELEASE ORAL at 20:39

## 2024-12-02 RX ADMIN — DIVALPROEX SODIUM 250 MG: 500 TABLET, DELAYED RELEASE ORAL at 07:22

## 2024-12-02 NOTE — GROUP NOTE
Group Therapy Note    Date: 12/2/2024    Group Start Time: 1330  Group End Time: 1415  Group Topic: Process Group - Inpatient    [facility]    Paulina Schmitt        Group Therapy Note    Attendees: 2/3    Writer facilitated an inpatient processing group. Writer had patients engage in a reflective activity in which they were asked to discuss Past, Present, and Future using expressive arts. Writer encouraged patients to discuss discharge plans, etc. Writer held the space as patients processed. Writer concluded session with a grounding exercise and encouraging patients to provide input and feedback regarding the group.        Patient's Goal:  To attend and participate in groups and activities daily    Notes:  Pt actively participated. Pt hyperverbal at times but was able to engage in parts of the expressive arts.    Status After Intervention:  Improved    Participation Level: Active Listener and Interactive    Participation Quality: Attentive      Speech:  pressured      Thought Process/Content: Flight of ideas      Affective Functioning: Congruent      Mood: euthymic      Level of consciousness:  Alert and Attentive      Response to Learning: Able to retain information and Progressing to goal      Endings: None Reported    Modes of Intervention: Exploration and Activity      Discipline Responsible: /Counselor      Signature:  Paulina Schmitt LPC

## 2024-12-02 NOTE — GROUP NOTE
Group Therapy Note    Date: 12/2/2024    Group Start Time: 1415  Group End Time: 1500  Group Topic: Psychoeducation    [facility]    Paulina Schmitt        Group Therapy Note    Attendees: 2/3    Writer facilitated an inpatient psych-ed group. Writer provided a handout regarding short-term and long term goals and encouraged patients to process and reflect. Writer concluded session by having patients offer feedback regarding the group.            Patient's Goal:  To attend and participate in groups and activities daily    Notes:  Pt hyperverbal but was able to discuss some goals. Pt required some redirection for staying on topic.    Status After Intervention:  Improved    Participation Level: Active Listener and Interactive    Participation Quality: Attentive      Speech:  normal      Thought Process/Content: Flight of ideas      Affective Functioning: Congruent      Mood: euthymic      Level of consciousness:  Alert and Attentive      Response to Learning: Able to retain information and Progressing to goal      Endings: None Reported    Modes of Intervention: Education      Discipline Responsible: /Counselor      Signature:  Paulina Schmitt LPC

## 2024-12-02 NOTE — GROUP NOTE
Group Therapy Note    Date: 12/1/2024    Group Start Time: 2000  Group End Time: 2045  Group Topic: Wrap-Up    [facility]    Efrain Snowden        Group Therapy Note    Attendees: 3         Patient's Goal:  n/a    Notes:  happy    Status After Intervention:  Improved    Participation Level: Active Listener    Participation Quality: Supportive      Speech:  normal      Thought Process/Content: Logical      Affective Functioning: Congruent      Mood:  happy      Level of consciousness:  Alert      Response to Learning: Able to verbalize current knowledge/experience      Endings: None Reported    Modes of Intervention: Socialization      Discipline Responsible: Behav[employer]nedix Tech      Signature:  Efrain Snowden

## 2024-12-02 NOTE — GROUP NOTE
Group Therapy Note    Date: 12/2/2024    Group Start Time: 1000  Group End Time: 1045  Group Topic: Community Meeting    [facility]    Leana Uribe        Group Therapy Note    Attendees: 3       Patient's Goal:  N/A    Notes:  N/A  Status After Intervention:  Improved    Participation Level: Active Listener    Participation Quality: Appropriate      Speech:  normal      Thought Process/Content: Logical      Affective Functioning: Congruent      Mood: anxious      Level of consciousness:  Alert      Response to Learning: Able to verbalize current knowledge/experience      Endings: None Reported    Modes of Intervention: Support      Discipline Responsible: Behavorial Health Tech      Signature:  Leana Uribe

## 2024-12-03 PROCEDURE — 6370000000 HC RX 637 (ALT 250 FOR IP): Performed by: HOSPITALIST

## 2024-12-03 PROCEDURE — 6370000000 HC RX 637 (ALT 250 FOR IP): Performed by: FAMILY MEDICINE

## 2024-12-03 PROCEDURE — 1240000000 HC EMOTIONAL WELLNESS R&B

## 2024-12-03 PROCEDURE — 6370000000 HC RX 637 (ALT 250 FOR IP): Performed by: PSYCHIATRY & NEUROLOGY

## 2024-12-03 RX ORDER — SENNOSIDES A AND B 8.6 MG/1
1 TABLET, FILM COATED ORAL NIGHTLY PRN
Status: DISCONTINUED | OUTPATIENT
Start: 2024-12-03 | End: 2024-12-05 | Stop reason: HOSPADM

## 2024-12-03 RX ADMIN — CETIRIZINE HYDROCHLORIDE 5 MG: 5 TABLET ORAL at 08:27

## 2024-12-03 RX ADMIN — LEVOTHYROXINE SODIUM 25 MCG: 0.05 TABLET ORAL at 05:39

## 2024-12-03 RX ADMIN — TRAZODONE HYDROCHLORIDE 50 MG: 50 TABLET ORAL at 20:29

## 2024-12-03 RX ADMIN — BENZTROPINE MESYLATE 1 MG: 1 TABLET ORAL at 08:27

## 2024-12-03 RX ADMIN — DIVALPROEX SODIUM 500 MG: 500 TABLET, DELAYED RELEASE ORAL at 08:27

## 2024-12-03 RX ADMIN — Medication 1 TABLET: at 08:26

## 2024-12-03 RX ADMIN — DIVALPROEX SODIUM 250 MG: 500 TABLET, DELAYED RELEASE ORAL at 20:32

## 2024-12-03 RX ADMIN — DIVALPROEX SODIUM 250 MG: 500 TABLET, DELAYED RELEASE ORAL at 08:31

## 2024-12-03 RX ADMIN — DIVALPROEX SODIUM 500 MG: 500 TABLET, DELAYED RELEASE ORAL at 20:31

## 2024-12-03 RX ADMIN — RISPERIDONE 2 MG: 1 TABLET, FILM COATED ORAL at 20:28

## 2024-12-03 RX ADMIN — ATORVASTATIN CALCIUM 10 MG: 10 TABLET, FILM COATED ORAL at 20:28

## 2024-12-03 RX ADMIN — FAMOTIDINE 10 MG: 10 TABLET ORAL at 20:29

## 2024-12-03 RX ADMIN — DOCUSATE SODIUM 100 MG: 100 CAPSULE, LIQUID FILLED ORAL at 08:26

## 2024-12-03 RX ADMIN — RISPERIDONE 1 MG: 1 TABLET, FILM COATED ORAL at 08:27

## 2024-12-03 RX ADMIN — FAMOTIDINE 10 MG: 10 TABLET ORAL at 08:26

## 2024-12-03 ASSESSMENT — PAIN SCALES - GENERAL: PAINLEVEL_OUTOF10: 0

## 2024-12-03 NOTE — GROUP NOTE
Group Therapy Note    Date: 12/3/2024    Group Start Time: 1425  Group End Time: 1510  Group Topic: Psychoeducation    [facility]    Paulina Schmitt        Group Therapy Note    Attendees: 3/4    Writer facilitated a psych-ed group. Writer provided a handout regarding Relationship Green Flags. Therapeutic purpose of the group was for patients to process and discuss healthy relationship qualities listed on handout. Writer held the space as patients processed. Writer concluded session with a grounding exercise and encouraging patients to provide input and feedback regarding the group.      Pt came at end of group.       Signature:  Paulina Schmitt

## 2024-12-03 NOTE — GROUP NOTE
Group Therapy Note    Date: 12/2/2024    Group Start Time: 2000  Group End Time: 2100  Group Topic: Wrap-Up    [facility]    Caterina Gómez        Group Therapy Note    Attendees: 3             Patient's Goal:  No Goal      Notes:   Intends to sleep 8 hrs   Ate  all food at each meal  No thoughts of self harm,    Is taking medication / no side effects               Status After Intervention:  Improved    Participation Level: None    Participation Quality: none        Speech:  slurred      Thought Process/Content: Delusional  Flight of ideas      Affective Functioning: Congruent      Mood: irritable      Level of consciousness:  Inattentive      Response to Learning: Progressing to goal      Endings: None Reported    Modes of Intervention: Support      Discipline Responsible: Behavorial Health Tech      Signature:  Caterina Spain

## 2024-12-03 NOTE — GROUP NOTE
Group Therapy Note    Date: 12/3/2024    Group Start Time: 1340  Group End Time: 1425  Group Topic: Process Group - Inpatient    [facility]    Paulina Schmitt        Group Therapy Note    Attendees: 2/4    Writer facilitated an inpatient processing group. Writer had patients engage in an expressive arts exercise in which they were asked to begin outlines of vision boards. Writer encouraged patients to express any feelings they may be having, discuss discharge plans, etc. Writer held the space as patients processed. Writer concluded session with a grounding exercise and encouraging patients to provide input and feedback regarding the group.        Pt sleeping during group.       Signature:  Paulina Schmitt

## 2024-12-04 PROCEDURE — 6370000000 HC RX 637 (ALT 250 FOR IP): Performed by: FAMILY MEDICINE

## 2024-12-04 PROCEDURE — 1240000000 HC EMOTIONAL WELLNESS R&B

## 2024-12-04 PROCEDURE — 6370000000 HC RX 637 (ALT 250 FOR IP): Performed by: PSYCHIATRY & NEUROLOGY

## 2024-12-04 PROCEDURE — 6370000000 HC RX 637 (ALT 250 FOR IP): Performed by: HOSPITALIST

## 2024-12-04 RX ORDER — DIVALPROEX SODIUM 250 MG/1
250 TABLET, FILM COATED, EXTENDED RELEASE ORAL 2 TIMES DAILY
Qty: 60 TABLET | Refills: 0 | Status: SHIPPED | OUTPATIENT
Start: 2024-12-04 | End: 2025-01-03

## 2024-12-04 RX ORDER — FAMOTIDINE 10 MG
10 TABLET ORAL 2 TIMES DAILY
Qty: 60 TABLET | Refills: 0 | Status: SHIPPED | OUTPATIENT
Start: 2024-12-04 | End: 2025-01-03

## 2024-12-04 RX ORDER — RISPERIDONE 1 MG/1
1 TABLET ORAL DAILY
Qty: 30 TABLET | Refills: 0 | Status: SHIPPED | OUTPATIENT
Start: 2024-12-05 | End: 2025-01-04

## 2024-12-04 RX ORDER — RISPERIDONE 2 MG/1
2 TABLET ORAL NIGHTLY
Qty: 30 TABLET | Refills: 0 | Status: SHIPPED | OUTPATIENT
Start: 2024-12-04 | End: 2025-01-03

## 2024-12-04 RX ORDER — LEVOTHYROXINE SODIUM 25 UG/1
25 TABLET ORAL DAILY
Qty: 30 TABLET | Refills: 0 | Status: SHIPPED | OUTPATIENT
Start: 2024-12-04 | End: 2025-01-03

## 2024-12-04 RX ORDER — MECOBALAMIN 5000 MCG
10 TABLET,DISINTEGRATING ORAL NIGHTLY PRN
Qty: 30 TABLET | Refills: 0 | Status: SHIPPED | OUTPATIENT
Start: 2024-12-04 | End: 2025-01-03

## 2024-12-04 RX ORDER — SIMVASTATIN 20 MG
20 TABLET ORAL NIGHTLY
Qty: 30 TABLET | Refills: 0 | Status: SHIPPED | OUTPATIENT
Start: 2024-12-04 | End: 2025-01-03

## 2024-12-04 RX ORDER — BENZTROPINE MESYLATE 1 MG/1
1 TABLET ORAL DAILY
Qty: 30 TABLET | Refills: 0 | Status: SHIPPED | OUTPATIENT
Start: 2024-12-05 | End: 2025-01-04

## 2024-12-04 RX ORDER — DIVALPROEX SODIUM 500 MG/1
500 TABLET, FILM COATED, EXTENDED RELEASE ORAL 2 TIMES DAILY
Qty: 60 TABLET | Refills: 0 | Status: SHIPPED | OUTPATIENT
Start: 2024-12-04 | End: 2025-01-03

## 2024-12-04 RX ADMIN — CETIRIZINE HYDROCHLORIDE 5 MG: 5 TABLET ORAL at 08:14

## 2024-12-04 RX ADMIN — RISPERIDONE 2 MG: 1 TABLET, FILM COATED ORAL at 20:29

## 2024-12-04 RX ADMIN — FAMOTIDINE 10 MG: 10 TABLET ORAL at 08:14

## 2024-12-04 RX ADMIN — Medication 1 TABLET: at 08:14

## 2024-12-04 RX ADMIN — DIVALPROEX SODIUM 500 MG: 500 TABLET, DELAYED RELEASE ORAL at 20:30

## 2024-12-04 RX ADMIN — DIVALPROEX SODIUM 250 MG: 500 TABLET, DELAYED RELEASE ORAL at 20:30

## 2024-12-04 RX ADMIN — DIVALPROEX SODIUM 500 MG: 500 TABLET, DELAYED RELEASE ORAL at 08:14

## 2024-12-04 RX ADMIN — BENZTROPINE MESYLATE 1 MG: 1 TABLET ORAL at 08:14

## 2024-12-04 RX ADMIN — FAMOTIDINE 10 MG: 10 TABLET ORAL at 20:29

## 2024-12-04 RX ADMIN — ATORVASTATIN CALCIUM 10 MG: 10 TABLET, FILM COATED ORAL at 20:29

## 2024-12-04 RX ADMIN — DIVALPROEX SODIUM 250 MG: 500 TABLET, DELAYED RELEASE ORAL at 08:15

## 2024-12-04 RX ADMIN — RISPERIDONE 1 MG: 1 TABLET, FILM COATED ORAL at 08:14

## 2024-12-04 RX ADMIN — DOCUSATE SODIUM 100 MG: 100 CAPSULE, LIQUID FILLED ORAL at 08:14

## 2024-12-04 RX ADMIN — LEVOTHYROXINE SODIUM 25 MCG: 0.05 TABLET ORAL at 06:55

## 2024-12-04 ASSESSMENT — PAIN SCALES - GENERAL: PAINLEVEL_OUTOF10: 0

## 2024-12-04 NOTE — GROUP NOTE
Group Therapy Note    Date: 12/4/2024    Group Start Time: 1345  Group End Time: 1505  Group Topic: Process Group - Inpatient    [facility]    Paulina Schmitt        Group Therapy Note    Attendees: 3/3    Writer facilitated an inpatient psych-ed group and processing group combination. Writer combined groups due to pt participation levels.  Writer facilitated an inpatient processing group in which patients were asked to engage in expressive arts to discuss \"Where is my energy?' Therapeutic purpose of the activity was for patients to discuss areas they need to focus on versus areas they can let go of. Writer provided a handout regarding communication skills. Writer held the space as patients processed. Writer concluded session with a grounding exercise and encouraging patients to provide input and feedback regarding the group.        Patient's Goal:  To attend and participate in groups and activities daily.    Notes:  Pt actively participated. Pt hyperverbal and required some redirection at times but was able to participate in some aspects of the group.     Status After Intervention:  Improved    Participation Level: Active Listener and Interactive    Participation Quality: Appropriate, Attentive, and Sharing      Speech:  normal      Thought Process/Content: Logical  Linear      Affective Functioning: Congruent      Mood: elevated      Level of consciousness:  Alert and Attentive      Response to Learning: Able to retain information and Progressing to goal      Endings: None Reported    Modes of Intervention: Education, Exploration, and Activity      Discipline Responsible: /Counselor      Signature:  Paulina Schmitt

## 2024-12-04 NOTE — TRANSITION OF CARE
from [facility] pharmacy   - albuterol inhaler     -cetirizine refill for allergy pill    - hydrochlorothiazide (new, 1 pill daily)    - ketotifen allergy eye drop  
on: December 5, 2024  What changed: when to take this           * This list has 2 medication(s) that are the same as other medications prescribed for you. Read the directions carefully, and ask your doctor or other care provider to review them with you.                CONTINUE taking these medications      * divalproex 250 MG extended release tablet  Commonly known as: DEPAKOTE ER  Take 1 tablet by mouth 2 times daily Take with 500 mg     * divalproex 500 MG extended release tablet  Commonly known as: DEPAKOTE ER  Take 1 tablet by mouth 2 times daily     ENSURE COMPLETE PO     levothyroxine 25 MCG tablet  Commonly known as: SYNTHROID  Take 1 tablet by mouth Daily     sennosides-docusate sodium 8.6-50 MG tablet  Commonly known as: SENOKOT-S     simvastatin 20 MG tablet  Commonly known as: ZOCOR  Take 1 tablet by mouth nightly     therapeutic multivitamin-minerals tablet     Ventolin  (90 Base) MCG/ACT inhaler  Generic drug: albuterol sulfate HFA           * This list has 2 medication(s) that are the same as other medications prescribed for you. Read the directions carefully, and ask your doctor or other care provider to review them with you.                STOP taking these medications      amLODIPine 10 MG tablet  Commonly known as: NORVASC     melatonin 3 MG Tabs tablet  Replaced by: melatonin 5 MG Tbdp disintegrating tablet               Where to Get Your Medications        You can get these medications from any pharmacy    Bring a paper prescription for each of these medications  benztropine 1 MG tablet  divalproex 250 MG extended release tablet  divalproex 500 MG extended release tablet  famotidine 10 MG tablet  levothyroxine 25 MCG tablet  melatonin 5 MG Tbdp disintegrating tablet  risperiDONE 1 MG tablet  risperiDONE 2 MG tablet  simvastatin 20 MG tablet         Pending Labs: No    If yes, what labs are pending?  none  To obtain results of studies pending at discharge,  if any, please contact

## 2024-12-04 NOTE — DISCHARGE INSTR - COC
Catheter: None   Colostomy/Ileostomy/Ileal Conduit: No       Date of Last BM: 12/3/2024  No intake or output data in the 24 hours ending 12/04/24 1557  No intake/output data recorded.    Safety Concerns:     At Risk for Falls    Impairments/Disabilities:      None    Nutrition Therapy:  Current Nutrition Therapy:   - Oral Diet:  General and Easy to Chew  - Oral Nutrition Supplement:  Standard  twice a day    Routes of Feeding: Oral  Liquids: No Restrictions  Daily Fluid Restriction: no  Last Modified Barium Swallow with Video (Video Swallowing Test): not done    Treatments at the Time of Hospital Discharge:   Respiratory Treatments: None  Oxygen Therapy:  is not on home oxygen therapy.  Ventilator:    - No ventilator support    Rehab Therapies: none  Weight Bearing Status/Restrictions: No weight bearing restrictions  Other Medical Equipment (for information only, NOT a DME order):  Rollator walker from prior to admission.  Other Treatments: none    Patient's personal belongings (please select all that are sent with patient):  Clothing, Rollator walker, hygiene items, shoes, blanket    RN SIGNATURE:  Electronically signed by Jennifer Abad RN on 12/4/24 at 4:06 PM EST

## 2024-12-04 NOTE — DISCHARGE SUMMARY
PSYCHIATRIC DISCHARGE SUMMARY         IDENTIFICATION:    Patient Name  Svetlana Smith   Date of Birth 1945   [facility] [id number]   Medical Record Number  [id number]      Age  79 y.o.   PCP Toi Smith MD   Admit date:  11/24/2024    Discharge date: 12/5/2024   Room Number  105/01  @ [facility]   Date of Service  12/5/2024            TYPE OF DISCHARGE: REGULAR               CONDITION AT DISCHARGE: Improved       PROVISIONAL & DISCHARGE DIAGNOSES:    Admission Diagnoses:   Schizoaffective disorder (HCC) [F25.9]  Mood disorder (HCC) [F39]    Discharge Diagnoses:      Hospital Problems             Last Modified POA    * (Principal) Unspecified mood (affective) disorder (HCC) 11/24/2024 Yes           CC & HISTORY OF PRESENT ILLNESS:  The patient, Svetlana Smith, is a 79 y.o.  Black /  female with a past psychiatric history significant for mood disorder unspecified admitted to [facility] under TDO for worsening of behavior/psychosis.  Patient is a poor historian.  Patient not able to provide details related to the event that happened prior to come to the hospital.  Per chart review patient with some aggressive behaviors and delusions prior to coming to the hospital.  Stated that she is feeling good.  She denied any depressed or anxious mood.  Patient with some elevated mood and energy levels.  Denied any auditory or visual hallucinations.  Denied any suicidal or homicidal ideations.  Reported good sleep and appetite.  Denied any anxiety or panic attacks.          HOSPITALIZATION COURSE:    Svetlana Smith was admitted to the inpatient psychiatric unit [facility] for acute psychiatric stabilization in regards to symptomatology as described in the HPI above.  She was restarted on her home medications Depakote and Risperdal.  Risperdal was increased up to 1 mg p.o. daily and 2 mg p.o. bedtime.  Patient with some excessive

## 2024-12-04 NOTE — DISCHARGE INSTR - DIET

## 2024-12-04 NOTE — GROUP NOTE
Group Therapy Note    Date: 12/4/2024    Group Start Time: 1050  Group End Time: 1130  Group Topic: Community Meeting    [facility]    Stephanie Pereira LPN; Caterina Gómez        Group Therapy Note    Attendees Two             Notes:    Patient's Goal:  None- \"Nobody knows what the day holds\"   Ate 95% of meals   NO Depression, No anxiety,  No Pain  Progress 4    No thoughts of self harm  \" I would not hurt myself for anything.      Status After Intervention:  Unchanged    Participation Level: None    Participation Quality: Inappropriate and Intrusive      Speech:  Loud, Pressured       Thought Process/Content: Delusional  Flight of ideas      Affective Functioning: Congruent      Mood: anxious and elevated      Level of consciousness:  Alert and Inattentive      Response to Learning: Progressing to goal      Endings: None Reported    Modes of Intervention: Support      Discipline Responsible: Behavorial Health Tech      Signature:  Caterina Spain     Detail Level: Zone Detail Level: Generalized Detail Level: Detailed

## 2024-12-05 VITALS
BODY MASS INDEX: 20.96 KG/M2 | DIASTOLIC BLOOD PRESSURE: 71 MMHG | RESPIRATION RATE: 16 BRPM | HEART RATE: 79 BPM | HEIGHT: 61 IN | OXYGEN SATURATION: 98 % | WEIGHT: 111 LBS | SYSTOLIC BLOOD PRESSURE: 125 MMHG | TEMPERATURE: 97.7 F

## 2024-12-05 PROCEDURE — 6370000000 HC RX 637 (ALT 250 FOR IP): Performed by: HOSPITALIST

## 2024-12-05 PROCEDURE — 6370000000 HC RX 637 (ALT 250 FOR IP): Performed by: FAMILY MEDICINE

## 2024-12-05 PROCEDURE — 6370000000 HC RX 637 (ALT 250 FOR IP): Performed by: PSYCHIATRY & NEUROLOGY

## 2024-12-05 RX ADMIN — Medication 1 TABLET: at 08:14

## 2024-12-05 RX ADMIN — DIVALPROEX SODIUM 250 MG: 500 TABLET, DELAYED RELEASE ORAL at 08:16

## 2024-12-05 RX ADMIN — BENZTROPINE MESYLATE 1 MG: 1 TABLET ORAL at 08:14

## 2024-12-05 RX ADMIN — RISPERIDONE 1 MG: 1 TABLET, FILM COATED ORAL at 08:14

## 2024-12-05 RX ADMIN — CETIRIZINE HYDROCHLORIDE 5 MG: 5 TABLET ORAL at 08:14

## 2024-12-05 RX ADMIN — FAMOTIDINE 10 MG: 10 TABLET ORAL at 08:14

## 2024-12-05 RX ADMIN — DIVALPROEX SODIUM 500 MG: 500 TABLET, DELAYED RELEASE ORAL at 08:16

## 2024-12-05 RX ADMIN — LEVOTHYROXINE SODIUM 25 MCG: 0.05 TABLET ORAL at 06:18

## 2024-12-05 NOTE — PLAN OF CARE
Problem: Discharge Planning  Goal: Discharge to home or other facility with appropriate resources  11/25/2024 2321 by Mavis Collado RN  Outcome: Progressing  11/25/2024 0926 by Candice Dumont RN  Outcome: Progressing     Problem: Risk for Elopement  Goal: Patient will not exit the unit/facility without proper excort  11/25/2024 2321 by Mavis Collado RN  Outcome: Progressing  11/25/2024 0926 by Candice Dumont RN  Outcome: Progressing  Flowsheets (Taken 11/24/2024 2136 by Martinez De León RN)  Nursing Interventions for Elopement Risk:   Reduce environmental triggers   Place patient in room far away from exits and stairways     Problem: Safety - Adult  Goal: Free from fall injury  11/25/2024 2321 by Mavis Collado RN  Outcome: Progressing  11/25/2024 0926 by Candice Dumont RN  Outcome: Progressing     Problem: ABCDS Injury Assessment  Goal: Absence of physical injury  11/25/2024 2321 by Mavis Collado RN  Outcome: Progressing  11/25/2024 0926 by Candice Dumont RN  Outcome: Progressing     Problem: Skin/Tissue Integrity - Adult  Goal: Skin integrity remains intact  11/25/2024 2321 by Mavis Collado RN  Outcome: Progressing  11/25/2024 0926 by Candice Dumont RN  Outcome: Progressing     Problem: Gastrointestinal - Adult  Goal: Maintains or returns to baseline bowel function  11/25/2024 0926 by Candice Dumont RN  Outcome: Progressing     Problem: Behavior  Goal: Pt/Family maintain appropriate behavior and adhere to behavioral management agreement, if implemented  Description: INTERVENTIONS:  1. Assess patient/family's coping skills and  non-compliant behavior (including use of illegal substances)  2. Notify security of behavior or suspected illegal substances which indicate the need for search of the family and/or belongings  3. Encourage verbalization of thoughts and concerns in a socially appropriate manner  4. Utilize positive, consistent limit setting strategies supporting safety 
  Problem: Discharge Planning  Goal: Discharge to home or other facility with appropriate resources  11/29/2024 2220 by Mavis Collado RN  Outcome: Progressing  11/29/2024 0915 by Candice Dumont RN  Outcome: Progressing     Problem: Risk for Elopement  Goal: Patient will not exit the unit/facility without proper excort  11/29/2024 2220 by Mavis Collado RN  Outcome: Progressing  11/29/2024 0915 by Candice Dumont RN  Outcome: Progressing     Problem: Safety - Adult  Goal: Free from fall injury  11/29/2024 2220 by Mavis Collado RN  Outcome: Progressing  11/29/2024 0915 by aCndice Dumont RN  Outcome: Progressing     Problem: Skin/Tissue Integrity - Adult  Goal: Skin integrity remains intact  Outcome: Progressing     Problem: Behavior  Goal: Pt/Family maintain appropriate behavior and adhere to behavioral management agreement, if implemented  Description: INTERVENTIONS:  1. Assess patient/family's coping skills and  non-compliant behavior (including use of illegal substances)  2. Notify security of behavior or suspected illegal substances which indicate the need for search of the family and/or belongings  3. Encourage verbalization of thoughts and concerns in a socially appropriate manner  4. Utilize positive, consistent limit setting strategies supporting safety of patient, staff and others  5. Encourage participation in the decision making process about the behavioral management agreement  6. If a visitor's behavior poses a threat to safety call refer to organization policy.  7. Initiate consult with , Psychosocial CNS, Spiritual Care as appropriate  Outcome: Progressing     Problem: Involuntary Admit  Goal: Will cooperate with staff recommendations and doctor's orders and will demonstrate appropriate behavior  Description: INTERVENTIONS:  1. Treat underlying conditions and offer medication as ordered  2. Educate regarding involuntary admission procedures and rules  3. Contain 
  Problem: Discharge Planning  Goal: Discharge to home or other facility with appropriate resources  Outcome: Progressing     
  Problem: Discharge Planning  Goal: Discharge to home or other facility with appropriate resources  Outcome: Progressing     Problem: Risk for Elopement  Goal: Patient will not exit the unit/facility without proper excort  11/26/2024 2351 by Mavis Collado RN  Outcome: Progressing  11/26/2024 1726 by Consuelo Contreras RN  Outcome: Progressing     Problem: Safety - Adult  Goal: Free from fall injury  11/26/2024 2351 by Mavis Collado RN  Outcome: Progressing  11/26/2024 1726 by Consuelo Contreras RN  Outcome: Progressing     Problem: ABCDS Injury Assessment  Goal: Absence of physical injury  11/26/2024 2351 by Mavis Collado RN  Outcome: Progressing  11/26/2024 1726 by Consuelo Contreras RN  Outcome: Progressing     Problem: Skin/Tissue Integrity - Adult  Goal: Skin integrity remains intact  11/26/2024 1726 by Consuelo Contreras RN  Outcome: Progressing     Problem: Involuntary Admit  Goal: Will cooperate with staff recommendations and doctor's orders and will demonstrate appropriate behavior  Description: INTERVENTIONS:  1. Treat underlying conditions and offer medication as ordered  2. Educate regarding involuntary admission procedures and rules  3. Contain excessive/inappropriate behavior per unit and hospital policies  Outcome: Progressing     
  Problem: Discharge Planning  Goal: Discharge to home or other facility with appropriate resources  Outcome: Progressing     Problem: Risk for Elopement  Goal: Patient will not exit the unit/facility without proper excort  11/27/2024 2338 by Martinez De León RN  Outcome: Progressing  11/27/2024 1223 by Jennifer Abad RN  Outcome: Progressing  Flowsheets (Taken 11/27/2024 1223)  Nursing Interventions for Elopement Risk:   Reduce environmental triggers   Place patient in room far away from exits and stairways     Problem: Safety - Adult  Goal: Free from fall injury  11/27/2024 2338 by Martinez De León RN  Outcome: Progressing  11/27/2024 1223 by Jennifer Abad RN  Outcome: Progressing  Flowsheets (Taken 11/27/2024 1223)  Free From Fall Injury: Instruct family/caregiver on patient safety     Problem: ABCDS Injury Assessment  Goal: Absence of physical injury  11/27/2024 2338 by Martinez De León RN  Outcome: Progressing  11/27/2024 1223 by Jennifer Abad RN  Outcome: Progressing  Flowsheets (Taken 11/27/2024 1223)  Absence of Physical Injury: Implement safety measures based on patient assessment     Problem: Skin/Tissue Integrity - Adult  Goal: Skin integrity remains intact  11/27/2024 2338 by Martinez De León RN  Outcome: Progressing  11/27/2024 1223 by Jennifer Abad RN  Outcome: Progressing  Flowsheets (Taken 11/27/2024 1223)  Skin Integrity Remains Intact: Monitor for areas of redness and/or skin breakdown     Problem: Gastrointestinal - Adult  Goal: Maintains or returns to baseline bowel function  11/27/2024 2338 by Martinez De León RN  Outcome: Progressing  11/27/2024 1223 by Jennifer Abad RN  Outcome: Progressing  Flowsheets (Taken 11/27/2024 1223)  Maintains or returns to baseline bowel function: Encourage mobilization and activity     Problem: Behavior  Goal: Pt/Family maintain appropriate behavior and adhere to behavioral management agreement, if implemented  Description: 
  Problem: Discharge Planning  Goal: Discharge to home or other facility with appropriate resources  Outcome: Progressing     Problem: Risk for Elopement  Goal: Patient will not exit the unit/facility without proper excort  11/28/2024 2021 by Martinez De León RN  Outcome: Progressing  11/28/2024 1601 by Consuelo Contreras RN  Outcome: Progressing     Problem: Safety - Adult  Goal: Free from fall injury  11/28/2024 2021 by Martinez De León RN  Outcome: Progressing  11/28/2024 1601 by Consuelo Contreras RN  Outcome: Progressing     Problem: ABCDS Injury Assessment  Goal: Absence of physical injury  11/28/2024 2021 by Martinez De León RN  Outcome: Progressing  11/28/2024 1601 by Consuelo Contreras RN  Outcome: Progressing     Problem: Skin/Tissue Integrity - Adult  Goal: Skin integrity remains intact  11/28/2024 2021 by Martinez De León RN  Outcome: Progressing  11/28/2024 1601 by Consuelo Contreras RN  Outcome: Progressing     Problem: Gastrointestinal - Adult  Goal: Maintains or returns to baseline bowel function  11/28/2024 2021 by Martinez De León RN  Outcome: Progressing  11/28/2024 1601 by Consuelo Contreras RN  Outcome: Progressing     Problem: Behavior  Goal: Pt/Family maintain appropriate behavior and adhere to behavioral management agreement, if implemented  Description: INTERVENTIONS:  1. Assess patient/family's coping skills and  non-compliant behavior (including use of illegal substances)  2. Notify security of behavior or suspected illegal substances which indicate the need for search of the family and/or belongings  3. Encourage verbalization of thoughts and concerns in a socially appropriate manner  4. Utilize positive, consistent limit setting strategies supporting safety of patient, staff and others  5. Encourage participation in the decision making process about the behavioral management agreement  6. If a visitor's behavior poses a threat to safety call refer to organization policy.  7. Initiate 
  Problem: Discharge Planning  Goal: Discharge to home or other facility with appropriate resources  Outcome: Progressing     Problem: Risk for Elopement  Goal: Patient will not exit the unit/facility without proper excort  12/1/2024 0202 by Mavis Collado RN  Outcome: Progressing  11/30/2024 1540 by Consuelo Contreras RN  Outcome: Progressing     Problem: Safety - Adult  Goal: Free from fall injury  12/1/2024 0202 by Mavis Collado RN  Outcome: Progressing  11/30/2024 1540 by Consuelo Contreras RN  Outcome: Progressing     Problem: ABCDS Injury Assessment  Goal: Absence of physical injury  12/1/2024 0202 by Mavis Collado RN  Outcome: Progressing  11/30/2024 1540 by Consuelo Contreras RN  Outcome: Progressing     Problem: Skin/Tissue Integrity - Adult  Goal: Skin integrity remains intact  12/1/2024 0202 by Mavis Collado RN  Outcome: Progressing  11/30/2024 1540 by Consuelo Contreras RN  Outcome: Progressing     Problem: Gastrointestinal - Adult  Goal: Maintains or returns to baseline bowel function  11/30/2024 1540 by Consuelo Contreras RN  Outcome: Progressing     Problem: Behavior  Goal: Pt/Family maintain appropriate behavior and adhere to behavioral management agreement, if implemented  Description: INTERVENTIONS:  1. Assess patient/family's coping skills and  non-compliant behavior (including use of illegal substances)  2. Notify security of behavior or suspected illegal substances which indicate the need for search of the family and/or belongings  3. Encourage verbalization of thoughts and concerns in a socially appropriate manner  4. Utilize positive, consistent limit setting strategies supporting safety of patient, staff and others  5. Encourage participation in the decision making process about the behavioral management agreement  6. If a visitor's behavior poses a threat to safety call refer to organization policy.  7. Initiate consult with , Psychosocial 
  Problem: Discharge Planning  Goal: Discharge to home or other facility with appropriate resources  Outcome: Progressing     Problem: Risk for Elopement  Goal: Patient will not exit the unit/facility without proper excort  Outcome: Progressing     Problem: Safety - Adult  Goal: Free from fall injury  Outcome: Progressing     Problem: ABCDS Injury Assessment  Goal: Absence of physical injury  Outcome: Progressing     Problem: Behavior  Goal: Pt/Family maintain appropriate behavior and adhere to behavioral management agreement, if implemented  Description: INTERVENTIONS:  1. Assess patient/family's coping skills and  non-compliant behavior (including use of illegal substances)  2. Notify security of behavior or suspected illegal substances which indicate the need for search of the family and/or belongings  3. Encourage verbalization of thoughts and concerns in a socially appropriate manner  4. Utilize positive, consistent limit setting strategies supporting safety of patient, staff and others  5. Encourage participation in the decision making process about the behavioral management agreement  6. If a visitor's behavior poses a threat to safety call refer to organization policy.  7. Initiate consult with , Psychosocial CNS, Spiritual Care as appropriate  Outcome: Progressing     Problem: Involuntary Admit  Goal: Will cooperate with staff recommendations and doctor's orders and will demonstrate appropriate behavior  Description: INTERVENTIONS:  1. Treat underlying conditions and offer medication as ordered  2. Educate regarding involuntary admission procedures and rules  3. Contain excessive/inappropriate behavior per unit and hospital policies  Outcome: Progressing     Problem: Pain  Goal: Verbalizes/displays adequate comfort level or baseline comfort level  Outcome: Progressing     
  Problem: Discharge Planning  Goal: Discharge to home or other facility with appropriate resources  Outcome: Progressing     Problem: Safety - Adult  Goal: Free from fall injury  Outcome: Progressing     Problem: ABCDS Injury Assessment  Goal: Absence of physical injury  Outcome: Progressing     Problem: Skin/Tissue Integrity - Adult  Goal: Skin integrity remains intact  Outcome: Progressing     Problem: Gastrointestinal - Adult  Goal: Maintains or returns to baseline bowel function  Outcome: Progressing     Problem: Involuntary Admit  Goal: Will cooperate with staff recommendations and doctor's orders and will demonstrate appropriate behavior  Description: INTERVENTIONS:  1. Treat underlying conditions and offer medication as ordered  2. Educate regarding involuntary admission procedures and rules  3. Contain excessive/inappropriate behavior per unit and hospital policies  Outcome: Progressing     Problem: Pain  Goal: Verbalizes/displays adequate comfort level or baseline comfort level  Outcome: Progressing     Problem: Risk for Elopement  Goal: Patient will not exit the unit/facility without proper excort  Outcome: Completed     Problem: Behavior  Goal: Pt/Family maintain appropriate behavior and adhere to behavioral management agreement, if implemented  Description: INTERVENTIONS:  1. Assess patient/family's coping skills and  non-compliant behavior (including use of illegal substances)  2. Notify security of behavior or suspected illegal substances which indicate the need for search of the family and/or belongings  3. Encourage verbalization of thoughts and concerns in a socially appropriate manner  4. Utilize positive, consistent limit setting strategies supporting safety of patient, staff and others  5. Encourage participation in the decision making process about the behavioral management agreement  6. If a visitor's behavior poses a threat to safety call refer to organization policy.  7. Initiate consult 
recommendations and doctor's orders and will demonstrate appropriate behavior: Contain excessive/inappropriate behavior per unit and hospital policies     Problem: Pain  Goal: Verbalizes/displays adequate comfort level or baseline comfort level  12/4/2024 2201 by Mavis Collado, RN  Outcome: Progressing  12/4/2024 1048 by Jennifer Abad, RN  Outcome: Progressing

## 2024-12-05 NOTE — GROUP NOTE
Group Therapy Note    Date: 12/4/2024    Group Start Time: 2000  Group End Time: 2045  Group Topic: Wrap-Up    [facility]    Efrain Snowden        Group Therapy Note    Attendees: 3         Patient's Goal:  n/a    Notes:  talking to self    Status After Intervention:  Improved    Participation Level: Active Listener    Participation Quality: Supportive      Speech:  normal      Thought Process/Content: Logical      Affective Functioning: Congruent      Mood:  smiling       Level of consciousness:  Alert      Response to Learning: Able to verbalize current knowledge/experience      Endings: None Reported    Modes of Intervention: Socialization      Discipline Responsible: Behavorial Health Tech      Signature:  Efrain Snowden

## 2024-12-05 NOTE — PROGRESS NOTES
Psychiatric Progress Note      Patient: Svetlana Smith MRN: [id number]  SSN: [id number]    YOB: 1945  Age: 79 y.o.  Sex: female      Admit Date: 11/24/2024       Subjective:     Svetlana Smith stated she is feeling good.  Denied any depressed or anxious mood.  Denied any suicidal or homicidal ideations.  Reported good sleep and appetite.  Denied any side effects of medications.  No behavioral issues this morning.    Objective:     Vitals:    11/30/24 0633 11/30/24 1530 12/01/24 0611 12/01/24 1500   BP: 102/62 (!) 162/72 105/69 (!) 141/80   Pulse: 82 90 83 96   Resp: 18 16 16 16   Temp: 97.8 °F (36.6 °C) 97.5 °F (36.4 °C) 97.5 °F (36.4 °C) 97.7 °F (36.5 °C)   TempSrc: Temporal Temporal Temporal Temporal   SpO2: 98% 91% 97% 96%   Weight:       Height:            Mental Status Exam:     Appearance-fairly groomed  Alert, oriented to self  Speech -slurred  Mood-elevated-improving  Affect-constricted  Thought porvpjf-avqurfmzoiog-brqwiyerf  Thought content-delusions   Thought perception-no auditory or visual hallucinations   No suicidal or homicidal ideations   Insight limited  Judgement Limited    MEDICATIONS:  Current Facility-Administered Medications   Medication Dose Route Frequency    famotidine (PEPCID) tablet 10 mg  10 mg Oral BID    benztropine (COGENTIN) tablet 1 mg  1 mg Oral Daily    risperiDONE (RISPERDAL) tablet 1 mg  1 mg Oral Daily    risperiDONE (RISPERDAL) tablet 2 mg  2 mg Oral Nightly    diphenhydrAMINE (BENADRYL) injection 25 mg  25 mg IntraMUSCular Q4H PRN    acetaminophen (TYLENOL) tablet 650 mg  650 mg Oral Q4H PRN    hydrOXYzine HCl (ATARAX) tablet 50 mg  50 mg Oral TID PRN    haloperidol (HALDOL) tablet 5 mg  5 mg Oral Q4H PRN    Or    haloperidol lactate (HALDOL) injection 5 mg  5 mg IntraMUSCular Q4H PRN    traZODone (DESYREL) tablet 50 mg  50 mg Oral Nightly PRN    magnesium hydroxide (MILK OF MAGNESIA) 400 MG/5ML suspension 30 mL  30 mL Oral Daily PRN    aluminum & 
       Psychiatric Progress Note      Patient: Svetlana Smith MRN: [id number]  SSN: [id number]    YOB: 1945  Age: 79 y.o.  Sex: female      Admit Date: 11/24/2024       Subjective:     Svetlana Smith patient is more cooperative this morning.  Some improvement with her behaviors compared to admission.  Continues to have excessive salivation.  Continues to have limited insight about her hospitalization.  Denied any suicidal or homicidal ideations.  Reported good sleep and appetite.    Objective:     Vitals:    11/27/24 0628 11/27/24 1705 11/28/24 0530 11/28/24 1745   BP: 134/80 136/84 (!) 89/52 102/64   Pulse: 69 96 78 76   Resp: 18 18 16 16   Temp: 97 °F (36.1 °C) 97.7 °F (36.5 °C) 97.7 °F (36.5 °C) 97.8 °F (36.6 °C)   TempSrc: Temporal Temporal Temporal Temporal   SpO2:  99% 97% 98%   Weight:       Height:            Mental Status Exam:     Appearance-fairly groomed  Alert, oriented to self  Speech -slurred  Mood-elevated  Affect-constricted  Thought process-disorgnazied  Thought content-delusions   Thought perception-no auditory or visual hallucinations   No suicidal or homicidal ideations   Insight limited  Judgement Limited    MEDICATIONS:  Current Facility-Administered Medications   Medication Dose Route Frequency    famotidine (PEPCID) tablet 10 mg  10 mg Oral BID    [START ON 11/29/2024] benztropine (COGENTIN) tablet 1 mg  1 mg Oral Daily    risperiDONE (RISPERDAL) tablet 1 mg  1 mg Oral Daily    risperiDONE (RISPERDAL) tablet 2 mg  2 mg Oral Nightly    diphenhydrAMINE (BENADRYL) injection 25 mg  25 mg IntraMUSCular Q4H PRN    acetaminophen (TYLENOL) tablet 650 mg  650 mg Oral Q4H PRN    hydrOXYzine HCl (ATARAX) tablet 50 mg  50 mg Oral TID PRN    haloperidol (HALDOL) tablet 5 mg  5 mg Oral Q4H PRN    Or    haloperidol lactate (HALDOL) injection 5 mg  5 mg IntraMUSCular Q4H PRN    traZODone (DESYREL) tablet 50 mg  50 mg Oral Nightly PRN    magnesium hydroxide (MILK OF MAGNESIA) 400 MG/5ML 
       Psychiatric Progress Note      Patient: Svetlana Smith MRN: [id number]  SSN: [id number]    YOB: 1945  Age: 79 y.o.  Sex: female      Admit Date: 11/24/2024       Subjective:     Svetlana Smith Per collateral information from staff patient with poor sleep last night.  Patient states that she is feeling better with her mood.  Patient is more cooperative.  Patient is less disorganized this morning.  She reported significant improvement with her excessive salivation.  Ambulating with a walker.  Denied any suicidal or homicidal ideations.  Reported good appetite.    Objective:     Vitals:    11/27/24 1705 11/28/24 0530 11/28/24 1745 11/29/24 0615   BP: 136/84 (!) 89/52 102/64 130/76   Pulse: 96 78 76 95   Resp: 18 16 16 16   Temp: 97.7 °F (36.5 °C) 97.7 °F (36.5 °C) 97.8 °F (36.6 °C) 97.1 °F (36.2 °C)   TempSrc: Temporal Temporal Temporal Temporal   SpO2: 99% 97% 98% 98%   Weight:       Height:            Mental Status Exam:     Appearance-fairly groomed  Alert, oriented to self  Speech -slurred  Mood-elevated  Affect-constricted  Thought process-disorgnazied  Thought content-delusions   Thought perception-no auditory or visual hallucinations   No suicidal or homicidal ideations   Insight limited  Judgement Limited    MEDICATIONS:  Current Facility-Administered Medications   Medication Dose Route Frequency    famotidine (PEPCID) tablet 10 mg  10 mg Oral BID    benztropine (COGENTIN) tablet 1 mg  1 mg Oral Daily    risperiDONE (RISPERDAL) tablet 1 mg  1 mg Oral Daily    risperiDONE (RISPERDAL) tablet 2 mg  2 mg Oral Nightly    diphenhydrAMINE (BENADRYL) injection 25 mg  25 mg IntraMUSCular Q4H PRN    acetaminophen (TYLENOL) tablet 650 mg  650 mg Oral Q4H PRN    hydrOXYzine HCl (ATARAX) tablet 50 mg  50 mg Oral TID PRN    haloperidol (HALDOL) tablet 5 mg  5 mg Oral Q4H PRN    Or    haloperidol lactate (HALDOL) injection 5 mg  5 mg IntraMUSCular Q4H PRN    traZODone (DESYREL) tablet 50 mg  50 mg Oral 
       Psychiatric Progress Note      Patient: Svetlana Smith MRN: [id number]  SSN: [id number]    YOB: 1945  Age: 79 y.o.  Sex: female      Admit Date: 11/24/2024       Subjective:     Svetlana Smith overall some improvement with her mood and psychotic symptoms compared to admission.  Patient with intermittent behaviors but redirectable.  Denied any suicidal or homicidal ideations.  Reported good sleep and appetite.  Denied any side effects of medications.    Objective:     Vitals:    11/29/24 0615 11/29/24 1530 11/30/24 0633 11/30/24 1530   BP: 130/76 105/61 102/62 (!) 162/72   Pulse: 95 81 82 90   Resp: 16 18 18 16   Temp: 97.1 °F (36.2 °C) 98 °F (36.7 °C) 97.8 °F (36.6 °C) 97.5 °F (36.4 °C)   TempSrc: Temporal Temporal Temporal Temporal   SpO2: 98% 99% 98% 91%   Weight:       Height:            Mental Status Exam:     Appearance-fairly groomed  Alert, oriented to self  Speech -slurred  Mood-elevated-improving  Affect-constricted  Thought wagxthn-riwccgopyrfh-wncwlgkwk  Thought content-delusions   Thought perception-no auditory or visual hallucinations   No suicidal or homicidal ideations   Insight limited  Judgement Limited    MEDICATIONS:  Current Facility-Administered Medications   Medication Dose Route Frequency    famotidine (PEPCID) tablet 10 mg  10 mg Oral BID    benztropine (COGENTIN) tablet 1 mg  1 mg Oral Daily    risperiDONE (RISPERDAL) tablet 1 mg  1 mg Oral Daily    risperiDONE (RISPERDAL) tablet 2 mg  2 mg Oral Nightly    diphenhydrAMINE (BENADRYL) injection 25 mg  25 mg IntraMUSCular Q4H PRN    acetaminophen (TYLENOL) tablet 650 mg  650 mg Oral Q4H PRN    hydrOXYzine HCl (ATARAX) tablet 50 mg  50 mg Oral TID PRN    haloperidol (HALDOL) tablet 5 mg  5 mg Oral Q4H PRN    Or    haloperidol lactate (HALDOL) injection 5 mg  5 mg IntraMUSCular Q4H PRN    traZODone (DESYREL) tablet 50 mg  50 mg Oral Nightly PRN    magnesium hydroxide (MILK OF MAGNESIA) 400 MG/5ML suspension 30 mL  30 mL Oral 
       Psychiatric Progress Note      Patient: Svetlana Smith MRN: [id number]  SSN: [id number]    YOB: 1945  Age: 79 y.o.  Sex: female      Admit Date: 11/24/2024       Subjective:     Svetlana Smith patient with some improvement with her behaviors compared to admission.  Continues to have limited insight about her hospitalization.  Reported good sleep and appetite.  Denied any suicidal or homicidal ideations.  Denied any side effects of medications.    Objective:     Vitals:    11/26/24 0536 11/26/24 1500 11/27/24 0628 11/27/24 1705   BP: 119/72 (!) 107/58 134/80 136/84   Pulse: 86 88 69 96   Resp: 16 16 18 18   Temp: 97.5 °F (36.4 °C) 97.7 °F (36.5 °C) 97 °F (36.1 °C) 97.7 °F (36.5 °C)   TempSrc: Temporal Temporal Temporal Temporal   SpO2: 98% 98%  99%   Weight:       Height:            Mental Status Exam:     Appearance-fairly groomed  Alert, oriented to self  Speech -slurred  Mood-elevated  Affect-constricted  Thought process-disorgnazied  Thought content-delusions   Thought perception-no auditory or visual hallucinations   No suicidal or homicidal ideations   Insight limited  Judgement Limited    MEDICATIONS:  Current Facility-Administered Medications   Medication Dose Route Frequency    benztropine (COGENTIN) tablet 0.5 mg  0.5 mg Oral BID    risperiDONE (RISPERDAL) tablet 1 mg  1 mg Oral Daily    risperiDONE (RISPERDAL) tablet 2 mg  2 mg Oral Nightly    diphenhydrAMINE (BENADRYL) injection 25 mg  25 mg IntraMUSCular Q4H PRN    acetaminophen (TYLENOL) tablet 650 mg  650 mg Oral Q4H PRN    hydrOXYzine HCl (ATARAX) tablet 50 mg  50 mg Oral TID PRN    haloperidol (HALDOL) tablet 5 mg  5 mg Oral Q4H PRN    Or    haloperidol lactate (HALDOL) injection 5 mg  5 mg IntraMUSCular Q4H PRN    traZODone (DESYREL) tablet 50 mg  50 mg Oral Nightly PRN    magnesium hydroxide (MILK OF MAGNESIA) 400 MG/5ML suspension 30 mL  30 mL Oral Daily PRN    aluminum & magnesium hydroxide-simethicone (MAALOX) 200-200-20 
       Psychiatric Progress Note      Patient: Svetlana Smith MRN: [id number]  SSN: [id number]    YOB: 1945  Age: 79 y.o.  Sex: female      Admit Date: 11/24/2024       Subjective:     Svetlana Smith patient refused to talk to writer.  Patient continues to have elevated mood and disorganized behaviors.      Objective:     Vitals:    11/25/24 0545 11/25/24 1500 11/26/24 0536 11/26/24 1500   BP: 126/79 113/78 119/72 (!) 107/58   Pulse: 79 67 86 88   Resp: 16 18 16 16   Temp: 97.5 °F (36.4 °C) 97.7 °F (36.5 °C) 97.5 °F (36.4 °C) 97.7 °F (36.5 °C)   TempSrc: Temporal Temporal Temporal Temporal   SpO2: 99% 93% 98% 98%   Weight:       Height:            Mental Status Exam:     Patient is alert and oriented to self.  Refused to talk to writer.    MEDICATIONS:  Current Facility-Administered Medications   Medication Dose Route Frequency    [START ON 11/27/2024] risperiDONE (RISPERDAL) tablet 1 mg  1 mg Oral Daily    risperiDONE (RISPERDAL) tablet 2 mg  2 mg Oral Nightly    diphenhydrAMINE (BENADRYL) injection 25 mg  25 mg IntraMUSCular Q4H PRN    acetaminophen (TYLENOL) tablet 650 mg  650 mg Oral Q4H PRN    hydrOXYzine HCl (ATARAX) tablet 50 mg  50 mg Oral TID PRN    haloperidol (HALDOL) tablet 5 mg  5 mg Oral Q4H PRN    Or    haloperidol lactate (HALDOL) injection 5 mg  5 mg IntraMUSCular Q4H PRN    traZODone (DESYREL) tablet 50 mg  50 mg Oral Nightly PRN    magnesium hydroxide (MILK OF MAGNESIA) 400 MG/5ML suspension 30 mL  30 mL Oral Daily PRN    aluminum & magnesium hydroxide-simethicone (MAALOX) 200-200-20 MG/5ML suspension 30 mL  30 mL Oral Q6H PRN    amLODIPine (NORVASC) tablet 10 mg  10 mg Oral Daily    levothyroxine (SYNTHROID) tablet 25 mcg  25 mcg Oral Daily    melatonin disintegrating tablet 10 mg  10 mg Oral Nightly PRN    therapeutic multivitamin-minerals 1 tablet  1 tablet Oral Daily    atorvastatin (LIPITOR) tablet 10 mg  10 mg Oral Nightly    senna (SENOKOT) tablet 8.6 mg  1 tablet Oral Nightly 
       Psychiatric Progress Note      Patient: Svetlana Smith MRN: [id number]  SSN: [id number]    YOB: 1945  Age: 79 y.o.  Sex: female      Admit Date: 11/24/2024       Subjective:     Svetlana Smith patient continues to have limited insight about her hospitalization.  Not able to provide details related to the event that happened prior to come to the hospital.  Continues to have disorganized thoughts.  No behavioral issues.  Reported good sleep and appetite.  Denied any suicidal or homicidal ideations.  Denied any side effects of medications.    Objective:     Vitals:    11/24/24 1503 11/24/24 1545 11/25/24 0545   BP: 127/73 127/73 126/79   Pulse: 84 84 79   Resp: 16 16 16   Temp: 98.7 °F (37.1 °C) 98.7 °F (37.1 °C) 97.5 °F (36.4 °C)   TempSrc: Temporal Temporal Temporal   SpO2:  96% 99%   Weight: 50.3 kg (111 lb)     Height: 1.549 m (5' 1\")          Mental Status Exam:     Appearance-fairly groomed  Alert, oriented to self  Speech -slurred  Mood-elevated  Affect-constricted  Thought process-disorgnazied  Thought content-delusions   Thought perception-no auditory or visual hallucinations   No suicidal or homicidal ideations   Insight limited  Judgement Limited    MEDICATIONS:  Current Facility-Administered Medications   Medication Dose Route Frequency    acetaminophen (TYLENOL) tablet 650 mg  650 mg Oral Q4H PRN    hydrOXYzine HCl (ATARAX) tablet 50 mg  50 mg Oral TID PRN    haloperidol (HALDOL) tablet 5 mg  5 mg Oral Q4H PRN    Or    haloperidol lactate (HALDOL) injection 5 mg  5 mg IntraMUSCular Q4H PRN    diphenhydrAMINE (BENADRYL) injection 50 mg  50 mg IntraMUSCular Q4H PRN    traZODone (DESYREL) tablet 50 mg  50 mg Oral Nightly PRN    magnesium hydroxide (MILK OF MAGNESIA) 400 MG/5ML suspension 30 mL  30 mL Oral Daily PRN    aluminum & magnesium hydroxide-simethicone (MAALOX) 200-200-20 MG/5ML suspension 30 mL  30 mL Oral Q6H PRN    amLODIPine (NORVASC) tablet 10 mg  10 mg Oral Daily    
       Psychiatric Progress Note      Patient: Svetlana Smith MRN: [id number]  SSN: [id number]    YOB: 1945  Age: 79 y.o.  Sex: female      Admit Date: 11/24/2024       Subjective:     Svetlana Smith overall significant improvement with her mood symptoms and behaviors compared to admission.  Reported good sleep and appetite.  Denied any suicidal or homicidal ideations.  Denied any side effects of medications.    Objective:     Vitals:    12/02/24 1459 12/03/24 0530 12/03/24 1513 12/04/24 1000   BP: 130/77 108/76 122/70 105/68   Pulse: 96 91 70 79   Resp: 16 16 16 16   Temp: 97.8 °F (36.6 °C) 98 °F (36.7 °C) 98 °F (36.7 °C) 97.7 °F (36.5 °C)   TempSrc: Temporal Temporal Temporal Temporal   SpO2: 99% 95% 98% 99%   Weight:       Height:            Mental Status Exam:     Appearance-fairly groomed  Alert, oriented to self  Speech -slurred  Mood-good  Affect-constricted  Thought process-simplistic  Thought content-delusions -improving  Thought perception-no auditory or visual hallucinations   No suicidal or homicidal ideations   Insight limited  Judgement Limited    MEDICATIONS:  Current Facility-Administered Medications   Medication Dose Route Frequency    senna (SENOKOT) tablet 8.6 mg  1 tablet Oral Nightly PRN    cetirizine (ZYRTEC) tablet 5 mg  5 mg Oral Daily    guaiFENesin (MUCINEX) extended release tablet 600 mg  600 mg Oral BID PRN    famotidine (PEPCID) tablet 10 mg  10 mg Oral BID    benztropine (COGENTIN) tablet 1 mg  1 mg Oral Daily    risperiDONE (RISPERDAL) tablet 1 mg  1 mg Oral Daily    risperiDONE (RISPERDAL) tablet 2 mg  2 mg Oral Nightly    diphenhydrAMINE (BENADRYL) injection 25 mg  25 mg IntraMUSCular Q4H PRN    acetaminophen (TYLENOL) tablet 650 mg  650 mg Oral Q4H PRN    hydrOXYzine HCl (ATARAX) tablet 50 mg  50 mg Oral TID PRN    haloperidol (HALDOL) tablet 5 mg  5 mg Oral Q4H PRN    Or    haloperidol lactate (HALDOL) injection 5 mg  5 mg IntraMUSCular Q4H PRN    traZODone (DESYREL) 
       Psychiatric Progress Note      Patient: Svetlana Smith MRN: [id number]  SSN: [id number]    YOB: 1945  Age: 79 y.o.  Sex: female      Admit Date: 11/24/2024       Subjective:     Svetlana Smith overall significant improvement with her mood and psychotic symptoms.  Patient is more alert and oriented this morning.  Number behavioral issues.  Denied any suicidal or homicidal ideations.  Reported good sleep and appetite.  Denied any side effects of medications.    Objective:     Vitals:    12/01/24 0611 12/01/24 1500 12/02/24 0606 12/02/24 1459   BP: 105/69 (!) 141/80 118/73 130/77   Pulse: 83 96 92 96   Resp: 16 16 16 16   Temp: 97.5 °F (36.4 °C) 97.7 °F (36.5 °C) 97.7 °F (36.5 °C) 97.8 °F (36.6 °C)   TempSrc: Temporal Temporal Temporal Temporal   SpO2: 97% 96% 99% 99%   Weight:       Height:            Mental Status Exam:     Appearance-fairly groomed  Alert, oriented to self  Speech -slurred  Mood-elevated-improving  Affect-constricted  Thought xjesonp-bnbcxeokjtdm-gonjoipty  Thought content-delusions   Thought perception-no auditory or visual hallucinations   No suicidal or homicidal ideations   Insight limited  Judgement Limited    MEDICATIONS:  Current Facility-Administered Medications   Medication Dose Route Frequency    cetirizine (ZYRTEC) tablet 5 mg  5 mg Oral Daily    guaiFENesin (MUCINEX) extended release tablet 600 mg  600 mg Oral BID PRN    famotidine (PEPCID) tablet 10 mg  10 mg Oral BID    benztropine (COGENTIN) tablet 1 mg  1 mg Oral Daily    risperiDONE (RISPERDAL) tablet 1 mg  1 mg Oral Daily    risperiDONE (RISPERDAL) tablet 2 mg  2 mg Oral Nightly    diphenhydrAMINE (BENADRYL) injection 25 mg  25 mg IntraMUSCular Q4H PRN    acetaminophen (TYLENOL) tablet 650 mg  650 mg Oral Q4H PRN    hydrOXYzine HCl (ATARAX) tablet 50 mg  50 mg Oral TID PRN    haloperidol (HALDOL) tablet 5 mg  5 mg Oral Q4H PRN    Or    haloperidol lactate (HALDOL) injection 5 mg  5 mg IntraMUSCular Q4H PRN    
 EXAMINATION:        Pt. Appears Neatly Groomed, Attentive, Cooperative, Motivated, and Enthusiastic.      Pt's speech is shows no evidence of impairment.     Pt's mood is euthymic.      Pt.'s thinking is unremarkable, clear this am.Organized and Goal Directed.      Pt's associations are Organized and Goal Directed.     Pt.Convincingly exhibits  Negative.  suicidal ideation.      Pt. Exhibits Negative,  homicidal ideation      Pt's CSSR-S level is rated Low.       Pt's judgement is age appropriate.      Pt. does not exhibit anxiety with  a baseline Attention span.    Pt. Continues to quietly ramble at times, easily redirected.  Pt. Cotinues with some spitting episodes, encouraged to use towel to mouth to absorb, not make any sound and throw away in cup given and then dump cup in garbage bag when full for reuse.  Pt. Cooperative at this time.  Pt. Ambulates with a rolling walker, steady gait.  Independent with bedside bathing with basin.  Pt. Keeps room very tidy makes her own bed everyday.        /73   Pulse 92   Temp 97.7 °F (36.5 °C) (Temporal)   Resp 16   Ht 1.549 m (5' 1\")   Wt 50.3 kg (111 lb)   SpO2 99%   BMI 20.97 kg/m²     NURSING INTERVENTIONS:  Nursing to administer medications to Pt, with monitoring and recording of compliance symptoms, and possible side effects as appropriate.        RESPONSE TO MEDICATION: Pt. Is   compliant with Medications.    PT. was engaged. Pt. Was  encouraged to participate in activities Showing  Good Particiapation.      Emotional Support and encouragement were provided to Pt.  Pt's response to this intervention appeared to be .       Treatment plans up to date.              
 EXAMINATION:        Pt. Appears Neatly Groomed, Attentive, Cooperative, Motivated, and Enthusiastic.      Pt's speech is shows no evidence of impairment.     Pt's mood is euthymic.      Pt.'s thinking is unremarkableOrganized and Goal Directed.      Pt's associations are Organized and Goal Directed.     Pt.Convincingly exhibits  Negative.  suicidal ideation.      Pt. Exhibits Negative,  homicidal ideation      Pt's CSSR-S level is rated low.       Pt's judgement is age appropriate.      Pt. does not exhibit anxiety with  a baseline Attention span.    Pt. Ambulating with roller walker, with steady gait, independent with ADL's, able to make needs known, Alert and oriented to self, time and situation. Pt. Easily redirectable. Pt. Has good eye contact.  Pt. States she is ready to go home and relax.        /71   Pulse 79   Temp 97.7 °F (36.5 °C) (Temporal)   Resp 16   Ht 1.549 m (5' 1\")   Wt 50.3 kg (111 lb)   SpO2 98%   BMI 20.97 kg/m²     NURSING INTERVENTIONS:  Nursing to administer medications to Pt, with monitoring and recording of compliance symptoms, and possible side effects as appropriate.        RESPONSE TO MEDICATION: Pt. Is   compliant with Medications.    PT. was engaged. Pt. Was  encouraged to participate in activities Showing  Good      Emotional Support and encouragement were provided to Pt.  Pt's response to this intervention appeared to be effective.       Treatment plans up to date.              
Pt has been pleasant and cooperative today with no complaints voiced. Sates she is feeling well. Takes meds and tries to attend groups. Safe on unit will continue to monitor.  
Pt has been walking with walker in the harding, loud and lively with staff. Singing and talking at intervals. Safe on unit will continue to monitor.  
Pt rested fairly well tonight with no problmes noted on rounds. Safe on unit will continue to monitor.  
traZODone (DESYREL) tablet 50 mg  50 mg Oral Nightly PRN    magnesium hydroxide (MILK OF MAGNESIA) 400 MG/5ML suspension 30 mL  30 mL Oral Daily PRN    aluminum & magnesium hydroxide-simethicone (MAALOX) 200-200-20 MG/5ML suspension 30 mL  30 mL Oral Q6H PRN    levothyroxine (SYNTHROID) tablet 25 mcg  25 mcg Oral Daily    melatonin disintegrating tablet 10 mg  10 mg Oral Nightly PRN    therapeutic multivitamin-minerals 1 tablet  1 tablet Oral Daily    atorvastatin (LIPITOR) tablet 10 mg  10 mg Oral Nightly    senna (SENOKOT) tablet 8.6 mg  1 tablet Oral Nightly    docusate sodium (COLACE) capsule 100 mg  100 mg Oral Daily    divalproex (DEPAKOTE) DR tablet 500 mg  500 mg Oral BID    divalproex (DEPAKOTE) DR tablet 250 mg  250 mg Oral BID    albuterol sulfate HFA (PROVENTIL;VENTOLIN;PROAIR) 108 (90 Base) MCG/ACT inhaler 2 puff  2 puff Inhalation Q4H PRN        DISCUSSION:  Discussed risk and benefits of medication, provided an opportunity to answer any questions, reviewed discharge goals.    Lab/Data Review:  No results found for this or any previous visit (from the past 24 hour(s)).            Assessment:     Principal Problem:    Unspecified mood (affective) disorder (HCC)  Resolved Problems:    * No resolved hospital problems. *    Patient admitted under TDO for worsening of behaviors and psychosis.    Plan:     Continue current medications - Risperdal 1 mg p.o. daily, 2 mg p.o. bedtime and Cogentin 1 mg p.o. daily.  More collateral information  Continue with therapy  Committed    Signed By: Khanh Elizabeth MD Psychiatry     December 3, 2024       
the nausea and vomiting seems to be a chronic intermittent issue for the patient and I do not see any acute medical issues, would recommend following with PCP and GI if reported episodes are bothersome to the patient.  She has not lost weight over the past 4 years despite intermittent complaints of nausea vomiting episodes.          Electronically signed by Christal Bucio MD on 11/28/2024 at 10:55 AM
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
